# Patient Record
Sex: FEMALE | Race: WHITE | NOT HISPANIC OR LATINO | Employment: UNEMPLOYED | URBAN - METROPOLITAN AREA
[De-identification: names, ages, dates, MRNs, and addresses within clinical notes are randomized per-mention and may not be internally consistent; named-entity substitution may affect disease eponyms.]

---

## 2017-06-20 ASSESSMENT — MIFFLIN-ST. JEOR: SCORE: 1449.1

## 2017-06-22 ENCOUNTER — ANESTHESIA - HEALTHEAST (OUTPATIENT)
Dept: SURGERY | Facility: CLINIC | Age: 52
End: 2017-06-22

## 2017-06-23 ENCOUNTER — SURGERY - HEALTHEAST (OUTPATIENT)
Dept: SURGERY | Facility: CLINIC | Age: 52
End: 2017-06-23

## 2017-07-28 ENCOUNTER — ANESTHESIA - HEALTHEAST (OUTPATIENT)
Dept: SURGERY | Facility: CLINIC | Age: 52
End: 2017-07-28

## 2017-07-28 ENCOUNTER — SURGERY - HEALTHEAST (OUTPATIENT)
Dept: SURGERY | Facility: CLINIC | Age: 52
End: 2017-07-28

## 2018-01-10 ENCOUNTER — AMBULATORY - HEALTHEAST (OUTPATIENT)
Dept: PALLIATIVE MEDICINE | Facility: OTHER | Age: 53
End: 2018-01-10

## 2018-01-10 DIAGNOSIS — M25.572 LEFT ANKLE PAIN: ICD-10-CM

## 2018-01-24 ASSESSMENT — MIFFLIN-ST. JEOR: SCORE: 1539.82

## 2018-01-26 ENCOUNTER — ANESTHESIA - HEALTHEAST (OUTPATIENT)
Dept: SURGERY | Facility: CLINIC | Age: 53
End: 2018-01-26

## 2018-01-26 ENCOUNTER — SURGERY - HEALTHEAST (OUTPATIENT)
Dept: SURGERY | Facility: CLINIC | Age: 53
End: 2018-01-26

## 2018-01-26 ASSESSMENT — MIFFLIN-ST. JEOR
SCORE: 1588.01
SCORE: 1577.47

## 2018-03-22 ENCOUNTER — HOSPITAL ENCOUNTER (OUTPATIENT)
Dept: PALLIATIVE MEDICINE | Facility: OTHER | Age: 53
Discharge: HOME | End: 2018-03-22
Attending: ORTHOPAEDIC SURGERY

## 2018-03-22 ENCOUNTER — RECORDS - HEALTHEAST (OUTPATIENT)
Dept: ADMINISTRATIVE | Facility: OTHER | Age: 53
End: 2018-03-22

## 2018-03-22 DIAGNOSIS — G89.29 CHRONIC PAIN OF LEFT ANKLE: ICD-10-CM

## 2018-03-22 DIAGNOSIS — M25.572 CHRONIC PAIN OF LEFT ANKLE: ICD-10-CM

## 2018-03-22 DIAGNOSIS — G89.4 CHRONIC PAIN SYNDROME: ICD-10-CM

## 2018-03-22 ASSESSMENT — MIFFLIN-ST. JEOR: SCORE: 1588.01

## 2018-03-27 ENCOUNTER — RECORDS - HEALTHEAST (OUTPATIENT)
Dept: ADMINISTRATIVE | Facility: OTHER | Age: 53
End: 2018-03-27

## 2018-03-27 ENCOUNTER — RECORDS - HEALTHEAST (OUTPATIENT)
Dept: BONE DENSITY | Facility: CLINIC | Age: 53
End: 2018-03-27

## 2018-03-27 DIAGNOSIS — M25.572 PAIN IN LEFT ANKLE AND JOINTS OF LEFT FOOT: ICD-10-CM

## 2018-03-27 DIAGNOSIS — G89.29 OTHER CHRONIC PAIN: ICD-10-CM

## 2018-03-27 DIAGNOSIS — G89.4 CHRONIC PAIN SYNDROME: ICD-10-CM

## 2018-04-13 ENCOUNTER — HOSPITAL ENCOUNTER (OUTPATIENT)
Dept: PALLIATIVE MEDICINE | Facility: OTHER | Age: 53
Discharge: HOME | End: 2018-04-13
Attending: NURSE PRACTITIONER

## 2018-04-13 DIAGNOSIS — M25.572 CHRONIC PAIN OF LEFT ANKLE: ICD-10-CM

## 2018-04-13 DIAGNOSIS — G89.4 CHRONIC PAIN SYNDROME: ICD-10-CM

## 2018-04-13 DIAGNOSIS — S93.422S TEAR OF DELTOID LIGAMENT OF ANKLE, LEFT, SEQUELA: ICD-10-CM

## 2018-04-13 DIAGNOSIS — G89.29 CHRONIC PAIN OF LEFT ANKLE: ICD-10-CM

## 2018-04-13 ASSESSMENT — MIFFLIN-ST. JEOR: SCORE: 1588.01

## 2018-04-20 ENCOUNTER — RECORDS - HEALTHEAST (OUTPATIENT)
Dept: ADMINISTRATIVE | Facility: OTHER | Age: 53
End: 2018-04-20

## 2018-04-23 ENCOUNTER — HOSPITAL ENCOUNTER (OUTPATIENT)
Dept: PALLIATIVE MEDICINE | Facility: OTHER | Age: 53
Discharge: HOME | End: 2018-04-23
Attending: NURSE PRACTITIONER

## 2018-04-23 DIAGNOSIS — G89.29 CHRONIC PAIN OF LEFT ANKLE: ICD-10-CM

## 2018-04-23 DIAGNOSIS — G89.4 CHRONIC PAIN SYNDROME: ICD-10-CM

## 2018-04-23 DIAGNOSIS — S93.422S TEAR OF DELTOID LIGAMENT OF ANKLE, LEFT, SEQUELA: ICD-10-CM

## 2018-04-23 DIAGNOSIS — M25.572 CHRONIC PAIN OF LEFT ANKLE: ICD-10-CM

## 2018-04-23 ASSESSMENT — MIFFLIN-ST. JEOR: SCORE: 1588.01

## 2018-05-01 ENCOUNTER — HOSPITAL ENCOUNTER (OUTPATIENT)
Dept: PALLIATIVE MEDICINE | Facility: OTHER | Age: 53
Discharge: HOME | End: 2018-05-01
Attending: COUNSELOR

## 2018-05-01 DIAGNOSIS — F33.2 SEVERE EPISODE OF RECURRENT MAJOR DEPRESSIVE DISORDER, WITHOUT PSYCHOTIC FEATURES (H): ICD-10-CM

## 2018-05-01 DIAGNOSIS — F11.10 OPIATE ABUSE, EPISODIC (H): ICD-10-CM

## 2018-05-01 DIAGNOSIS — F41.0 PANIC ATTACKS: ICD-10-CM

## 2018-05-01 DIAGNOSIS — F41.1 GENERALIZED ANXIETY DISORDER: ICD-10-CM

## 2018-05-01 DIAGNOSIS — F12.10 CANNABIS ABUSE: ICD-10-CM

## 2018-05-01 DIAGNOSIS — G89.4 CHRONIC PAIN SYNDROME: ICD-10-CM

## 2018-05-01 DIAGNOSIS — F10.10 ALCOHOL ABUSE: ICD-10-CM

## 2018-05-21 ENCOUNTER — COMMUNICATION - HEALTHEAST (OUTPATIENT)
Dept: PALLIATIVE MEDICINE | Facility: OTHER | Age: 53
End: 2018-05-21

## 2018-05-21 ENCOUNTER — HOSPITAL ENCOUNTER (OUTPATIENT)
Dept: PALLIATIVE MEDICINE | Facility: OTHER | Age: 53
Discharge: HOME | End: 2018-05-21
Attending: NURSE PRACTITIONER

## 2018-05-21 DIAGNOSIS — S93.422S TEAR OF DELTOID LIGAMENT OF ANKLE, LEFT, SEQUELA: ICD-10-CM

## 2018-05-21 DIAGNOSIS — G89.4 CHRONIC PAIN SYNDROME: ICD-10-CM

## 2018-05-21 DIAGNOSIS — G89.29 CHRONIC PAIN OF LEFT ANKLE: ICD-10-CM

## 2018-05-21 DIAGNOSIS — M25.572 CHRONIC PAIN OF LEFT ANKLE: ICD-10-CM

## 2018-05-21 ASSESSMENT — MIFFLIN-ST. JEOR: SCORE: 1588.01

## 2018-05-24 ENCOUNTER — COMMUNICATION - HEALTHEAST (OUTPATIENT)
Dept: PALLIATIVE MEDICINE | Facility: CLINIC | Age: 53
End: 2018-05-24

## 2018-05-24 DIAGNOSIS — G89.4 CHRONIC PAIN SYNDROME: ICD-10-CM

## 2018-05-24 DIAGNOSIS — M25.572 CHRONIC PAIN OF LEFT ANKLE: ICD-10-CM

## 2018-05-24 DIAGNOSIS — G89.29 CHRONIC PAIN OF LEFT ANKLE: ICD-10-CM

## 2018-05-29 ENCOUNTER — COMMUNICATION - HEALTHEAST (OUTPATIENT)
Dept: PALLIATIVE MEDICINE | Facility: CLINIC | Age: 53
End: 2018-05-29

## 2018-05-30 ENCOUNTER — HOSPITAL ENCOUNTER (OUTPATIENT)
Dept: PALLIATIVE MEDICINE | Facility: OTHER | Age: 53
Discharge: HOME | End: 2018-05-30
Attending: NURSE PRACTITIONER

## 2018-05-30 DIAGNOSIS — G89.4 CHRONIC PAIN SYNDROME: ICD-10-CM

## 2018-05-30 DIAGNOSIS — G89.29 CHRONIC PAIN OF LEFT ANKLE: ICD-10-CM

## 2018-05-30 DIAGNOSIS — M25.572 CHRONIC PAIN OF LEFT ANKLE: ICD-10-CM

## 2018-05-30 ASSESSMENT — MIFFLIN-ST. JEOR: SCORE: 1588.01

## 2018-05-31 ENCOUNTER — COMMUNICATION - HEALTHEAST (OUTPATIENT)
Dept: PALLIATIVE MEDICINE | Facility: OTHER | Age: 53
End: 2018-05-31

## 2018-06-06 ENCOUNTER — COMMUNICATION - HEALTHEAST (OUTPATIENT)
Dept: PALLIATIVE MEDICINE | Facility: OTHER | Age: 53
End: 2018-06-06

## 2018-06-08 ENCOUNTER — COMMUNICATION - HEALTHEAST (OUTPATIENT)
Dept: PALLIATIVE MEDICINE | Facility: OTHER | Age: 53
End: 2018-06-08

## 2018-06-08 DIAGNOSIS — G89.4 CHRONIC PAIN SYNDROME: ICD-10-CM

## 2018-06-08 DIAGNOSIS — G89.29 CHRONIC PAIN OF LEFT ANKLE: ICD-10-CM

## 2018-06-08 DIAGNOSIS — M25.572 CHRONIC PAIN OF LEFT ANKLE: ICD-10-CM

## 2018-06-15 ENCOUNTER — COMMUNICATION - HEALTHEAST (OUTPATIENT)
Dept: PALLIATIVE MEDICINE | Facility: OTHER | Age: 53
End: 2018-06-15

## 2018-06-15 DIAGNOSIS — M25.572 CHRONIC PAIN OF LEFT ANKLE: ICD-10-CM

## 2018-06-15 DIAGNOSIS — G89.29 CHRONIC PAIN OF LEFT ANKLE: ICD-10-CM

## 2018-06-15 DIAGNOSIS — G89.4 CHRONIC PAIN SYNDROME: ICD-10-CM

## 2018-06-18 ASSESSMENT — MIFFLIN-ST. JEOR: SCORE: 1567.63

## 2018-06-22 ENCOUNTER — ANESTHESIA - HEALTHEAST (OUTPATIENT)
Dept: SURGERY | Facility: CLINIC | Age: 53
End: 2018-06-22

## 2018-06-22 ENCOUNTER — SURGERY - HEALTHEAST (OUTPATIENT)
Dept: SURGERY | Facility: CLINIC | Age: 53
End: 2018-06-22

## 2018-06-22 ASSESSMENT — MIFFLIN-ST. JEOR: SCORE: 1567.04

## 2018-06-27 ENCOUNTER — HOSPITAL ENCOUNTER (OUTPATIENT)
Dept: PALLIATIVE MEDICINE | Facility: OTHER | Age: 53
Discharge: HOME | End: 2018-06-27
Attending: NURSE PRACTITIONER

## 2018-06-27 DIAGNOSIS — G89.4 CHRONIC PAIN SYNDROME: ICD-10-CM

## 2018-06-27 ASSESSMENT — MIFFLIN-ST. JEOR: SCORE: 1567.04

## 2018-07-09 ENCOUNTER — HOSPITAL ENCOUNTER (OUTPATIENT)
Dept: PALLIATIVE MEDICINE | Facility: OTHER | Age: 53
Discharge: HOME | End: 2018-07-09
Attending: ANESTHESIOLOGY

## 2018-07-09 DIAGNOSIS — G89.4 CHRONIC PAIN SYNDROME: ICD-10-CM

## 2018-07-20 ENCOUNTER — HOSPITAL ENCOUNTER (OUTPATIENT)
Dept: PALLIATIVE MEDICINE | Facility: OTHER | Age: 53
Discharge: HOME | End: 2018-07-20
Attending: NURSE PRACTITIONER

## 2018-07-20 DIAGNOSIS — Z98.890 S/P BONE GRAFT: ICD-10-CM

## 2018-07-20 DIAGNOSIS — G89.18 POSTOPERATIVE PAIN: ICD-10-CM

## 2018-07-20 DIAGNOSIS — Z98.1 STATUS POST ANKLE FUSION: ICD-10-CM

## 2018-07-20 DIAGNOSIS — G89.4 CHRONIC PAIN SYNDROME: ICD-10-CM

## 2018-07-20 DIAGNOSIS — Z98.890 STATUS POST HARDWARE REMOVAL: ICD-10-CM

## 2018-07-20 ASSESSMENT — MIFFLIN-ST. JEOR: SCORE: 1567.04

## 2018-07-24 ENCOUNTER — COMMUNICATION - HEALTHEAST (OUTPATIENT)
Dept: PALLIATIVE MEDICINE | Facility: CLINIC | Age: 53
End: 2018-07-24

## 2018-07-24 DIAGNOSIS — G89.18 POSTOPERATIVE PAIN: ICD-10-CM

## 2018-07-25 ENCOUNTER — HOSPITAL ENCOUNTER (OUTPATIENT)
Dept: PALLIATIVE MEDICINE | Facility: OTHER | Age: 53
Discharge: HOME | End: 2018-07-25
Attending: ANESTHESIOLOGY

## 2018-07-25 ENCOUNTER — COMMUNICATION - HEALTHEAST (OUTPATIENT)
Dept: PALLIATIVE MEDICINE | Facility: OTHER | Age: 53
End: 2018-07-25

## 2018-07-25 DIAGNOSIS — F11.90 CHRONIC, CONTINUOUS USE OF OPIOIDS: ICD-10-CM

## 2018-07-25 ASSESSMENT — MIFFLIN-ST. JEOR: SCORE: 1567.04

## 2018-07-27 ENCOUNTER — COMMUNICATION - HEALTHEAST (OUTPATIENT)
Dept: PALLIATIVE MEDICINE | Facility: OTHER | Age: 53
End: 2018-07-27

## 2018-08-06 ENCOUNTER — COMMUNICATION - HEALTHEAST (OUTPATIENT)
Dept: PALLIATIVE MEDICINE | Facility: OTHER | Age: 53
End: 2018-08-06

## 2018-08-13 ENCOUNTER — COMMUNICATION - HEALTHEAST (OUTPATIENT)
Dept: PALLIATIVE MEDICINE | Facility: OTHER | Age: 53
End: 2018-08-13

## 2018-08-13 DIAGNOSIS — Z98.890 STATUS POST HARDWARE REMOVAL: ICD-10-CM

## 2018-08-13 DIAGNOSIS — G89.18 POSTOPERATIVE PAIN: ICD-10-CM

## 2018-08-13 DIAGNOSIS — Z98.1 STATUS POST ANKLE FUSION: ICD-10-CM

## 2018-08-13 DIAGNOSIS — Z98.890 S/P BONE GRAFT: ICD-10-CM

## 2018-08-17 ENCOUNTER — HOSPITAL ENCOUNTER (OUTPATIENT)
Dept: PALLIATIVE MEDICINE | Facility: OTHER | Age: 53
Discharge: HOME | End: 2018-08-17
Attending: NURSE PRACTITIONER

## 2018-08-17 DIAGNOSIS — Z98.890 STATUS POST HARDWARE REMOVAL: ICD-10-CM

## 2018-08-17 DIAGNOSIS — G89.4 CHRONIC PAIN SYNDROME: ICD-10-CM

## 2018-08-17 DIAGNOSIS — Z98.890 S/P BONE GRAFT: ICD-10-CM

## 2018-08-17 DIAGNOSIS — Z98.1 STATUS POST ANKLE FUSION: ICD-10-CM

## 2018-08-17 DIAGNOSIS — G89.29 CHRONIC PAIN OF LEFT ANKLE: ICD-10-CM

## 2018-08-17 DIAGNOSIS — M25.572 CHRONIC PAIN OF LEFT ANKLE: ICD-10-CM

## 2018-08-17 DIAGNOSIS — G89.18 POSTOPERATIVE PAIN: ICD-10-CM

## 2018-08-17 ASSESSMENT — MIFFLIN-ST. JEOR: SCORE: 1567.04

## 2018-08-22 ENCOUNTER — COMMUNICATION - HEALTHEAST (OUTPATIENT)
Dept: PALLIATIVE MEDICINE | Facility: OTHER | Age: 53
End: 2018-08-22

## 2018-08-22 DIAGNOSIS — F11.90 CHRONIC, CONTINUOUS USE OF OPIOIDS: ICD-10-CM

## 2018-08-24 ENCOUNTER — HOSPITAL ENCOUNTER (OUTPATIENT)
Dept: PALLIATIVE MEDICINE | Facility: OTHER | Age: 53
Discharge: HOME | End: 2018-08-24
Attending: ANESTHESIOLOGY

## 2018-08-24 DIAGNOSIS — G89.4 CHRONIC PAIN SYNDROME: ICD-10-CM

## 2018-09-07 ENCOUNTER — HOSPITAL ENCOUNTER (OUTPATIENT)
Dept: PALLIATIVE MEDICINE | Facility: OTHER | Age: 53
Discharge: HOME | End: 2018-09-07
Attending: ANESTHESIOLOGY

## 2018-09-07 DIAGNOSIS — F41.9 ANXIETY DISORDER: ICD-10-CM

## 2018-09-09 ENCOUNTER — COMMUNICATION - HEALTHEAST (OUTPATIENT)
Dept: PALLIATIVE MEDICINE | Facility: OTHER | Age: 53
End: 2018-09-09

## 2018-09-09 DIAGNOSIS — F11.90 CHRONIC, CONTINUOUS USE OF OPIOIDS: ICD-10-CM

## 2018-09-10 ENCOUNTER — COMMUNICATION - HEALTHEAST (OUTPATIENT)
Dept: PALLIATIVE MEDICINE | Facility: CLINIC | Age: 53
End: 2018-09-10

## 2018-09-10 DIAGNOSIS — F11.90 CHRONIC, CONTINUOUS USE OF OPIOIDS: ICD-10-CM

## 2018-09-14 ENCOUNTER — HOSPITAL ENCOUNTER (OUTPATIENT)
Dept: PALLIATIVE MEDICINE | Facility: OTHER | Age: 53
Discharge: HOME | End: 2018-09-14
Attending: NURSE PRACTITIONER

## 2018-09-14 DIAGNOSIS — G89.29 CHRONIC PAIN OF LEFT ANKLE: ICD-10-CM

## 2018-09-14 DIAGNOSIS — F11.90 CHRONIC, CONTINUOUS USE OF OPIOIDS: ICD-10-CM

## 2018-09-14 DIAGNOSIS — Z98.890 S/P BONE GRAFT: ICD-10-CM

## 2018-09-14 DIAGNOSIS — Z98.890 S/P ANKLE LIGAMENT REPAIR: ICD-10-CM

## 2018-09-14 DIAGNOSIS — Z98.1 STATUS POST ANKLE FUSION: ICD-10-CM

## 2018-09-14 DIAGNOSIS — F32.A DEPRESSION, UNSPECIFIED DEPRESSION TYPE: ICD-10-CM

## 2018-09-14 DIAGNOSIS — Z98.890 STATUS POST HARDWARE REMOVAL: ICD-10-CM

## 2018-09-14 DIAGNOSIS — M25.572 CHRONIC PAIN OF LEFT ANKLE: ICD-10-CM

## 2018-09-14 DIAGNOSIS — G89.18 POSTOPERATIVE PAIN: ICD-10-CM

## 2018-09-14 ASSESSMENT — MIFFLIN-ST. JEOR: SCORE: 1567.04

## 2018-09-19 ENCOUNTER — COMMUNICATION - HEALTHEAST (OUTPATIENT)
Dept: PALLIATIVE MEDICINE | Facility: CLINIC | Age: 53
End: 2018-09-19

## 2018-09-19 DIAGNOSIS — M25.572 CHRONIC PAIN OF LEFT ANKLE: ICD-10-CM

## 2018-09-19 DIAGNOSIS — G89.29 CHRONIC PAIN OF LEFT ANKLE: ICD-10-CM

## 2018-09-19 DIAGNOSIS — G89.4 CHRONIC PAIN SYNDROME: ICD-10-CM

## 2018-09-28 ENCOUNTER — COMMUNICATION - HEALTHEAST (OUTPATIENT)
Dept: PALLIATIVE MEDICINE | Facility: OTHER | Age: 53
End: 2018-09-28

## 2018-10-08 ENCOUNTER — HOSPITAL ENCOUNTER (OUTPATIENT)
Dept: PALLIATIVE MEDICINE | Facility: OTHER | Age: 53
Discharge: HOME | End: 2018-10-08
Attending: NURSE PRACTITIONER

## 2018-10-08 DIAGNOSIS — Z98.890 S/P BONE GRAFT: ICD-10-CM

## 2018-10-08 DIAGNOSIS — Z98.1 STATUS POST ANKLE FUSION: ICD-10-CM

## 2018-10-08 DIAGNOSIS — F11.90 CHRONIC, CONTINUOUS USE OF OPIOIDS: ICD-10-CM

## 2018-10-08 DIAGNOSIS — G89.4 CHRONIC PAIN SYNDROME: ICD-10-CM

## 2018-10-08 DIAGNOSIS — G89.18 POSTOPERATIVE PAIN: ICD-10-CM

## 2018-10-08 DIAGNOSIS — G89.29 CHRONIC PAIN OF LEFT ANKLE: ICD-10-CM

## 2018-10-08 DIAGNOSIS — Z98.890 STATUS POST HARDWARE REMOVAL: ICD-10-CM

## 2018-10-08 DIAGNOSIS — M25.572 CHRONIC PAIN OF LEFT ANKLE: ICD-10-CM

## 2018-10-08 ASSESSMENT — MIFFLIN-ST. JEOR: SCORE: 1567.04

## 2018-10-10 ENCOUNTER — COMMUNICATION - HEALTHEAST (OUTPATIENT)
Dept: PALLIATIVE MEDICINE | Facility: OTHER | Age: 53
End: 2018-10-10

## 2018-10-10 DIAGNOSIS — G89.29 CHRONIC PAIN: ICD-10-CM

## 2018-10-11 ENCOUNTER — COMMUNICATION - HEALTHEAST (OUTPATIENT)
Dept: PALLIATIVE MEDICINE | Facility: OTHER | Age: 53
End: 2018-10-11

## 2018-10-15 ENCOUNTER — COMMUNICATION - HEALTHEAST (OUTPATIENT)
Dept: PALLIATIVE MEDICINE | Facility: OTHER | Age: 53
End: 2018-10-15

## 2018-10-15 DIAGNOSIS — F11.90 CHRONIC, CONTINUOUS USE OF OPIOIDS: ICD-10-CM

## 2018-10-15 DIAGNOSIS — Z98.890 S/P BONE GRAFT: ICD-10-CM

## 2018-10-15 DIAGNOSIS — Z98.1 STATUS POST ANKLE FUSION: ICD-10-CM

## 2018-10-15 DIAGNOSIS — F32.A DEPRESSION, UNSPECIFIED DEPRESSION TYPE: ICD-10-CM

## 2018-10-15 DIAGNOSIS — G89.29 CHRONIC PAIN OF LEFT ANKLE: ICD-10-CM

## 2018-10-15 DIAGNOSIS — M25.572 CHRONIC PAIN OF LEFT ANKLE: ICD-10-CM

## 2018-10-15 DIAGNOSIS — Z98.890 S/P ANKLE LIGAMENT REPAIR: ICD-10-CM

## 2018-10-15 DIAGNOSIS — Z98.890 STATUS POST HARDWARE REMOVAL: ICD-10-CM

## 2018-10-15 DIAGNOSIS — G89.18 POSTOPERATIVE PAIN: ICD-10-CM

## 2018-10-16 ENCOUNTER — HOSPITAL ENCOUNTER (OUTPATIENT)
Dept: PALLIATIVE MEDICINE | Facility: OTHER | Age: 53
Discharge: HOME | End: 2018-10-16
Attending: ANESTHESIOLOGY

## 2018-10-16 DIAGNOSIS — Z98.1 STATUS POST ANKLE FUSION: ICD-10-CM

## 2018-10-16 DIAGNOSIS — M25.572 CHRONIC PAIN OF LEFT ANKLE: ICD-10-CM

## 2018-10-16 DIAGNOSIS — Z98.890 STATUS POST HARDWARE REMOVAL: ICD-10-CM

## 2018-10-16 DIAGNOSIS — G89.18 POSTOPERATIVE PAIN: ICD-10-CM

## 2018-10-16 DIAGNOSIS — G89.4 CHRONIC PAIN SYNDROME: ICD-10-CM

## 2018-10-16 DIAGNOSIS — G89.29 CHRONIC PAIN OF LEFT ANKLE: ICD-10-CM

## 2018-10-16 DIAGNOSIS — Z98.890 S/P BONE GRAFT: ICD-10-CM

## 2018-10-16 ASSESSMENT — MIFFLIN-ST. JEOR: SCORE: 1567.04

## 2018-10-17 ENCOUNTER — COMMUNICATION - HEALTHEAST (OUTPATIENT)
Dept: PALLIATIVE MEDICINE | Facility: OTHER | Age: 53
End: 2018-10-17

## 2018-10-17 DIAGNOSIS — F43.23 ADJUSTMENT DISORDER WITH MIXED ANXIETY AND DEPRESSED MOOD: ICD-10-CM

## 2018-10-18 ENCOUNTER — COMMUNICATION - HEALTHEAST (OUTPATIENT)
Dept: PALLIATIVE MEDICINE | Facility: CLINIC | Age: 53
End: 2018-10-18

## 2018-10-18 DIAGNOSIS — M25.572 CHRONIC PAIN OF LEFT ANKLE: ICD-10-CM

## 2018-10-18 DIAGNOSIS — G89.4 CHRONIC PAIN SYNDROME: ICD-10-CM

## 2018-10-18 DIAGNOSIS — G89.29 CHRONIC PAIN OF LEFT ANKLE: ICD-10-CM

## 2018-10-22 ENCOUNTER — HOSPITAL ENCOUNTER (OUTPATIENT)
Dept: PALLIATIVE MEDICINE | Facility: OTHER | Age: 53
Discharge: HOME | End: 2018-10-22
Attending: NURSE PRACTITIONER

## 2018-10-22 DIAGNOSIS — G89.18 POSTOPERATIVE PAIN: ICD-10-CM

## 2018-10-22 DIAGNOSIS — G89.4 CHRONIC PAIN SYNDROME: ICD-10-CM

## 2018-10-22 DIAGNOSIS — Z98.1 STATUS POST ANKLE FUSION: ICD-10-CM

## 2018-10-22 DIAGNOSIS — Z98.890 STATUS POST HARDWARE REMOVAL: ICD-10-CM

## 2018-10-22 DIAGNOSIS — Z98.890 S/P BONE GRAFT: ICD-10-CM

## 2018-10-22 ASSESSMENT — MIFFLIN-ST. JEOR: SCORE: 1476.32

## 2018-10-23 ENCOUNTER — COMMUNICATION - HEALTHEAST (OUTPATIENT)
Dept: PALLIATIVE MEDICINE | Facility: OTHER | Age: 53
End: 2018-10-23

## 2018-11-01 ENCOUNTER — COMMUNICATION - HEALTHEAST (OUTPATIENT)
Dept: PALLIATIVE MEDICINE | Facility: CLINIC | Age: 53
End: 2018-11-01

## 2018-11-01 DIAGNOSIS — F11.90 CHRONIC, CONTINUOUS USE OF OPIOIDS: ICD-10-CM

## 2018-11-05 ENCOUNTER — COMMUNICATION - HEALTHEAST (OUTPATIENT)
Dept: PALLIATIVE MEDICINE | Facility: OTHER | Age: 53
End: 2018-11-05

## 2018-11-05 ENCOUNTER — HOSPITAL ENCOUNTER (OUTPATIENT)
Dept: PALLIATIVE MEDICINE | Facility: OTHER | Age: 53
Discharge: HOME | End: 2018-11-05
Attending: NURSE PRACTITIONER

## 2018-11-05 DIAGNOSIS — F11.90 CHRONIC, CONTINUOUS USE OF OPIOIDS: ICD-10-CM

## 2018-11-05 DIAGNOSIS — G89.4 CHRONIC PAIN SYNDROME: ICD-10-CM

## 2018-11-05 DIAGNOSIS — F43.23 ADJUSTMENT DISORDER WITH MIXED ANXIETY AND DEPRESSED MOOD: ICD-10-CM

## 2018-11-05 DIAGNOSIS — G89.18 POSTOPERATIVE PAIN: ICD-10-CM

## 2018-11-05 ASSESSMENT — MIFFLIN-ST. JEOR: SCORE: 1476.32

## 2018-11-16 ENCOUNTER — COMMUNICATION - HEALTHEAST (OUTPATIENT)
Dept: PALLIATIVE MEDICINE | Facility: OTHER | Age: 53
End: 2018-11-16

## 2018-11-16 DIAGNOSIS — Z98.890 S/P ANKLE LIGAMENT REPAIR: ICD-10-CM

## 2018-11-16 DIAGNOSIS — M25.572 CHRONIC PAIN OF LEFT ANKLE: ICD-10-CM

## 2018-11-16 DIAGNOSIS — F32.A DEPRESSION, UNSPECIFIED DEPRESSION TYPE: ICD-10-CM

## 2018-11-16 DIAGNOSIS — F11.90 CHRONIC, CONTINUOUS USE OF OPIOIDS: ICD-10-CM

## 2018-11-16 DIAGNOSIS — Z98.890 S/P BONE GRAFT: ICD-10-CM

## 2018-11-16 DIAGNOSIS — G89.29 CHRONIC PAIN OF LEFT ANKLE: ICD-10-CM

## 2018-11-16 DIAGNOSIS — G89.18 POSTOPERATIVE PAIN: ICD-10-CM

## 2018-11-16 DIAGNOSIS — Z98.890 STATUS POST HARDWARE REMOVAL: ICD-10-CM

## 2018-11-16 DIAGNOSIS — Z98.1 STATUS POST ANKLE FUSION: ICD-10-CM

## 2018-11-20 ENCOUNTER — HOSPITAL ENCOUNTER (OUTPATIENT)
Dept: PALLIATIVE MEDICINE | Facility: OTHER | Age: 53
Discharge: HOME | End: 2018-11-20
Attending: NURSE PRACTITIONER

## 2018-11-20 DIAGNOSIS — G89.18 POSTOPERATIVE PAIN: ICD-10-CM

## 2018-11-20 DIAGNOSIS — Z98.890 S/P BONE GRAFT: ICD-10-CM

## 2018-11-20 DIAGNOSIS — Z98.890 STATUS POST HARDWARE REMOVAL: ICD-10-CM

## 2018-11-20 DIAGNOSIS — Z98.1 STATUS POST ANKLE FUSION: ICD-10-CM

## 2018-11-20 DIAGNOSIS — G89.4 CHRONIC PAIN SYNDROME: ICD-10-CM

## 2018-11-20 ASSESSMENT — MIFFLIN-ST. JEOR: SCORE: 1476.32

## 2018-11-27 ENCOUNTER — COMMUNICATION - HEALTHEAST (OUTPATIENT)
Dept: PALLIATIVE MEDICINE | Facility: OTHER | Age: 53
End: 2018-11-27

## 2018-11-27 ENCOUNTER — HOSPITAL ENCOUNTER (OUTPATIENT)
Dept: PALLIATIVE MEDICINE | Facility: OTHER | Age: 53
Discharge: HOME | End: 2018-11-27
Attending: ANESTHESIOLOGY

## 2018-11-27 DIAGNOSIS — Z98.1 STATUS POST ANKLE FUSION: ICD-10-CM

## 2018-11-27 DIAGNOSIS — M25.572 CHRONIC PAIN OF LEFT ANKLE: ICD-10-CM

## 2018-11-27 DIAGNOSIS — Z98.890 S/P BONE GRAFT: ICD-10-CM

## 2018-11-27 DIAGNOSIS — Z98.890 S/P ANKLE LIGAMENT REPAIR: ICD-10-CM

## 2018-11-27 DIAGNOSIS — F11.90 CHRONIC, CONTINUOUS USE OF OPIOIDS: ICD-10-CM

## 2018-11-27 DIAGNOSIS — G89.18 POSTOPERATIVE PAIN: ICD-10-CM

## 2018-11-27 DIAGNOSIS — G89.29 CHRONIC PAIN OF LEFT ANKLE: ICD-10-CM

## 2018-11-27 DIAGNOSIS — Z98.890 STATUS POST HARDWARE REMOVAL: ICD-10-CM

## 2018-11-27 DIAGNOSIS — F32.A DEPRESSION, UNSPECIFIED DEPRESSION TYPE: ICD-10-CM

## 2018-11-27 DIAGNOSIS — F43.23 ADJUSTMENT DISORDER WITH MIXED ANXIETY AND DEPRESSED MOOD: ICD-10-CM

## 2018-11-27 ASSESSMENT — MIFFLIN-ST. JEOR: SCORE: 1476.32

## 2018-11-28 ENCOUNTER — COMMUNICATION - HEALTHEAST (OUTPATIENT)
Dept: PALLIATIVE MEDICINE | Facility: OTHER | Age: 53
End: 2018-11-28

## 2018-11-29 ENCOUNTER — COMMUNICATION - HEALTHEAST (OUTPATIENT)
Dept: PALLIATIVE MEDICINE | Facility: OTHER | Age: 53
End: 2018-11-29

## 2018-11-29 DIAGNOSIS — G89.4 CHRONIC PAIN SYNDROME: ICD-10-CM

## 2018-11-29 DIAGNOSIS — M25.572 CHRONIC PAIN OF LEFT ANKLE: ICD-10-CM

## 2018-11-29 DIAGNOSIS — G89.29 CHRONIC PAIN OF LEFT ANKLE: ICD-10-CM

## 2018-12-18 ENCOUNTER — HOSPITAL ENCOUNTER (OUTPATIENT)
Dept: PALLIATIVE MEDICINE | Facility: OTHER | Age: 53
Discharge: HOME | End: 2018-12-18
Attending: NURSE PRACTITIONER

## 2018-12-18 DIAGNOSIS — G89.29 CHRONIC PAIN OF LEFT ANKLE: ICD-10-CM

## 2018-12-18 DIAGNOSIS — G89.4 CHRONIC PAIN SYNDROME: ICD-10-CM

## 2018-12-18 DIAGNOSIS — M25.572 CHRONIC PAIN OF LEFT ANKLE: ICD-10-CM

## 2018-12-18 DIAGNOSIS — F43.23 ADJUSTMENT DISORDER WITH MIXED ANXIETY AND DEPRESSED MOOD: ICD-10-CM

## 2018-12-18 ASSESSMENT — MIFFLIN-ST. JEOR: SCORE: 1476.32

## 2018-12-22 LAB — ARUP MISCELLANEOUS TEST: NORMAL

## 2018-12-23 LAB
MISCELLANEOUS TEST DEPT. - HE HISTORICAL: NORMAL
PERFORMING LAB: NORMAL
SPECIMEN STATUS: NORMAL
TEST NAME: NORMAL

## 2018-12-27 ENCOUNTER — COMMUNICATION - HEALTHEAST (OUTPATIENT)
Dept: PALLIATIVE MEDICINE | Facility: OTHER | Age: 53
End: 2018-12-27

## 2018-12-27 DIAGNOSIS — G89.4 CHRONIC PAIN SYNDROME: ICD-10-CM

## 2018-12-27 DIAGNOSIS — M25.572 CHRONIC PAIN OF LEFT ANKLE: ICD-10-CM

## 2018-12-27 DIAGNOSIS — G89.29 CHRONIC PAIN OF LEFT ANKLE: ICD-10-CM

## 2018-12-27 DIAGNOSIS — F11.90 CHRONIC, CONTINUOUS USE OF OPIOIDS: ICD-10-CM

## 2018-12-30 ENCOUNTER — COMMUNICATION - HEALTHEAST (OUTPATIENT)
Dept: PALLIATIVE MEDICINE | Facility: CLINIC | Age: 53
End: 2018-12-30

## 2018-12-30 DIAGNOSIS — F11.90 CHRONIC, CONTINUOUS USE OF OPIOIDS: ICD-10-CM

## 2018-12-31 ENCOUNTER — COMMUNICATION - HEALTHEAST (OUTPATIENT)
Dept: PALLIATIVE MEDICINE | Facility: OTHER | Age: 53
End: 2018-12-31

## 2019-01-03 ENCOUNTER — AMBULATORY - HEALTHEAST (OUTPATIENT)
Dept: PALLIATIVE MEDICINE | Facility: OTHER | Age: 54
End: 2019-01-03

## 2019-01-03 ENCOUNTER — COMMUNICATION - HEALTHEAST (OUTPATIENT)
Dept: PALLIATIVE MEDICINE | Facility: OTHER | Age: 54
End: 2019-01-03

## 2019-01-03 DIAGNOSIS — F11.20 CONTINUOUS OPIOID DEPENDENCE (H): ICD-10-CM

## 2019-01-03 DIAGNOSIS — G89.29 CHRONIC PAIN OF LEFT ANKLE: ICD-10-CM

## 2019-01-03 DIAGNOSIS — G89.4 CHRONIC PAIN SYNDROME: ICD-10-CM

## 2019-01-03 DIAGNOSIS — M25.572 CHRONIC PAIN OF LEFT ANKLE: ICD-10-CM

## 2019-01-04 ENCOUNTER — COMMUNICATION - HEALTHEAST (OUTPATIENT)
Dept: PALLIATIVE MEDICINE | Facility: OTHER | Age: 54
End: 2019-01-04

## 2019-01-04 DIAGNOSIS — G89.29 CHRONIC PAIN OF LEFT ANKLE: ICD-10-CM

## 2019-01-04 DIAGNOSIS — M25.572 CHRONIC PAIN OF LEFT ANKLE: ICD-10-CM

## 2019-01-04 DIAGNOSIS — G89.4 CHRONIC PAIN SYNDROME: ICD-10-CM

## 2019-01-14 ENCOUNTER — COMMUNICATION - HEALTHEAST (OUTPATIENT)
Dept: PALLIATIVE MEDICINE | Facility: OTHER | Age: 54
End: 2019-01-14

## 2019-01-14 DIAGNOSIS — F32.A DEPRESSION, UNSPECIFIED DEPRESSION TYPE: ICD-10-CM

## 2019-01-14 DIAGNOSIS — F11.90 CHRONIC, CONTINUOUS USE OF OPIOIDS: ICD-10-CM

## 2019-01-14 DIAGNOSIS — Z98.890 STATUS POST HARDWARE REMOVAL: ICD-10-CM

## 2019-01-14 DIAGNOSIS — Z98.1 STATUS POST ANKLE FUSION: ICD-10-CM

## 2019-01-14 DIAGNOSIS — G89.18 POSTOPERATIVE PAIN: ICD-10-CM

## 2019-01-14 DIAGNOSIS — G89.29 CHRONIC PAIN OF LEFT ANKLE: ICD-10-CM

## 2019-01-14 DIAGNOSIS — Z98.890 S/P BONE GRAFT: ICD-10-CM

## 2019-01-14 DIAGNOSIS — M25.572 CHRONIC PAIN OF LEFT ANKLE: ICD-10-CM

## 2019-01-14 DIAGNOSIS — Z98.890 S/P ANKLE LIGAMENT REPAIR: ICD-10-CM

## 2019-01-17 ENCOUNTER — HOSPITAL ENCOUNTER (OUTPATIENT)
Dept: PALLIATIVE MEDICINE | Facility: OTHER | Age: 54
Discharge: HOME | End: 2019-01-17
Attending: ANESTHESIOLOGY

## 2019-01-17 DIAGNOSIS — G89.4 CHRONIC PAIN SYNDROME: ICD-10-CM

## 2019-01-17 ASSESSMENT — MIFFLIN-ST. JEOR: SCORE: 1476.32

## 2019-01-21 ENCOUNTER — COMMUNICATION - HEALTHEAST (OUTPATIENT)
Dept: PALLIATIVE MEDICINE | Facility: OTHER | Age: 54
End: 2019-01-21

## 2019-01-21 DIAGNOSIS — G89.4 CHRONIC PAIN SYNDROME: ICD-10-CM

## 2019-01-25 ENCOUNTER — COMMUNICATION - HEALTHEAST (OUTPATIENT)
Dept: PALLIATIVE MEDICINE | Facility: OTHER | Age: 54
End: 2019-01-25

## 2019-01-25 DIAGNOSIS — M25.572 CHRONIC PAIN OF LEFT ANKLE: ICD-10-CM

## 2019-01-25 DIAGNOSIS — G89.29 CHRONIC PAIN OF LEFT ANKLE: ICD-10-CM

## 2019-01-25 DIAGNOSIS — G89.18 POSTOPERATIVE PAIN: ICD-10-CM

## 2019-01-25 DIAGNOSIS — G89.4 CHRONIC PAIN SYNDROME: ICD-10-CM

## 2019-01-27 ENCOUNTER — COMMUNICATION - HEALTHEAST (OUTPATIENT)
Dept: PALLIATIVE MEDICINE | Facility: OTHER | Age: 54
End: 2019-01-27

## 2019-01-27 DIAGNOSIS — G89.4 CHRONIC PAIN SYNDROME: ICD-10-CM

## 2019-01-27 DIAGNOSIS — Z98.1 STATUS POST ANKLE FUSION: ICD-10-CM

## 2019-01-27 DIAGNOSIS — G89.18 POSTOPERATIVE PAIN: ICD-10-CM

## 2019-01-28 ENCOUNTER — COMMUNICATION - HEALTHEAST (OUTPATIENT)
Dept: PALLIATIVE MEDICINE | Facility: OTHER | Age: 54
End: 2019-01-28

## 2019-02-01 ENCOUNTER — COMMUNICATION - HEALTHEAST (OUTPATIENT)
Dept: PALLIATIVE MEDICINE | Facility: OTHER | Age: 54
End: 2019-02-01

## 2019-02-01 DIAGNOSIS — M25.572 CHRONIC PAIN OF LEFT ANKLE: ICD-10-CM

## 2019-02-01 DIAGNOSIS — G89.29 CHRONIC PAIN OF LEFT ANKLE: ICD-10-CM

## 2019-02-01 DIAGNOSIS — G89.4 CHRONIC PAIN SYNDROME: ICD-10-CM

## 2019-02-02 ENCOUNTER — COMMUNICATION - HEALTHEAST (OUTPATIENT)
Dept: PALLIATIVE MEDICINE | Facility: CLINIC | Age: 54
End: 2019-02-02

## 2019-02-02 DIAGNOSIS — F11.90 CHRONIC, CONTINUOUS USE OF OPIOIDS: ICD-10-CM

## 2019-02-11 ENCOUNTER — COMMUNICATION - HEALTHEAST (OUTPATIENT)
Dept: PALLIATIVE MEDICINE | Facility: OTHER | Age: 54
End: 2019-02-11

## 2019-02-11 DIAGNOSIS — F11.90 CHRONIC, CONTINUOUS USE OF OPIOIDS: ICD-10-CM

## 2019-02-11 DIAGNOSIS — G89.18 POSTOPERATIVE PAIN: ICD-10-CM

## 2019-02-11 DIAGNOSIS — Z98.890 S/P BONE GRAFT: ICD-10-CM

## 2019-02-11 DIAGNOSIS — Z98.1 STATUS POST ANKLE FUSION: ICD-10-CM

## 2019-02-11 DIAGNOSIS — G89.29 CHRONIC PAIN OF LEFT ANKLE: ICD-10-CM

## 2019-02-11 DIAGNOSIS — Z98.890 STATUS POST HARDWARE REMOVAL: ICD-10-CM

## 2019-02-11 DIAGNOSIS — M25.572 CHRONIC PAIN OF LEFT ANKLE: ICD-10-CM

## 2019-02-11 DIAGNOSIS — F32.A DEPRESSION, UNSPECIFIED DEPRESSION TYPE: ICD-10-CM

## 2019-02-11 DIAGNOSIS — Z98.890 S/P ANKLE LIGAMENT REPAIR: ICD-10-CM

## 2019-02-26 ENCOUNTER — COMMUNICATION - HEALTHEAST (OUTPATIENT)
Dept: PALLIATIVE MEDICINE | Facility: OTHER | Age: 54
End: 2019-02-26

## 2019-02-26 DIAGNOSIS — G89.29 CHRONIC PAIN OF LEFT ANKLE: ICD-10-CM

## 2019-02-26 DIAGNOSIS — M25.572 CHRONIC PAIN OF LEFT ANKLE: ICD-10-CM

## 2019-02-26 DIAGNOSIS — G89.4 CHRONIC PAIN SYNDROME: ICD-10-CM

## 2019-02-26 DIAGNOSIS — G89.18 POSTOPERATIVE PAIN: ICD-10-CM

## 2019-03-11 ENCOUNTER — COMMUNICATION - HEALTHEAST (OUTPATIENT)
Dept: PALLIATIVE MEDICINE | Facility: OTHER | Age: 54
End: 2019-03-11

## 2019-03-11 DIAGNOSIS — F43.23 ADJUSTMENT DISORDER WITH MIXED ANXIETY AND DEPRESSED MOOD: ICD-10-CM

## 2019-03-11 DIAGNOSIS — Z98.890 S/P ANKLE LIGAMENT REPAIR: ICD-10-CM

## 2019-03-11 DIAGNOSIS — Z98.890 STATUS POST HARDWARE REMOVAL: ICD-10-CM

## 2019-03-11 DIAGNOSIS — G89.4 CHRONIC PAIN SYNDROME: ICD-10-CM

## 2019-03-11 DIAGNOSIS — F32.A DEPRESSION, UNSPECIFIED DEPRESSION TYPE: ICD-10-CM

## 2019-03-11 DIAGNOSIS — M25.572 CHRONIC PAIN OF LEFT ANKLE: ICD-10-CM

## 2019-03-11 DIAGNOSIS — F11.90 CHRONIC, CONTINUOUS USE OF OPIOIDS: ICD-10-CM

## 2019-03-11 DIAGNOSIS — G89.18 POSTOPERATIVE PAIN: ICD-10-CM

## 2019-03-11 DIAGNOSIS — Z98.890 S/P BONE GRAFT: ICD-10-CM

## 2019-03-11 DIAGNOSIS — G89.29 CHRONIC PAIN OF LEFT ANKLE: ICD-10-CM

## 2019-03-11 DIAGNOSIS — Z98.1 STATUS POST ANKLE FUSION: ICD-10-CM

## 2019-03-25 ENCOUNTER — COMMUNICATION - HEALTHEAST (OUTPATIENT)
Dept: PALLIATIVE MEDICINE | Facility: OTHER | Age: 54
End: 2019-03-25

## 2019-04-23 ENCOUNTER — COMMUNICATION - HEALTHEAST (OUTPATIENT)
Dept: PALLIATIVE MEDICINE | Facility: OTHER | Age: 54
End: 2019-04-23

## 2019-04-23 DIAGNOSIS — F32.A DEPRESSION, UNSPECIFIED DEPRESSION TYPE: ICD-10-CM

## 2019-04-23 DIAGNOSIS — F11.90 CHRONIC, CONTINUOUS USE OF OPIOIDS: ICD-10-CM

## 2019-04-23 DIAGNOSIS — Z98.890 S/P BONE GRAFT: ICD-10-CM

## 2019-04-23 DIAGNOSIS — Z98.1 STATUS POST ANKLE FUSION: ICD-10-CM

## 2019-04-23 DIAGNOSIS — Z98.890 STATUS POST HARDWARE REMOVAL: ICD-10-CM

## 2019-04-23 DIAGNOSIS — Z98.890 S/P ANKLE LIGAMENT REPAIR: ICD-10-CM

## 2019-04-23 DIAGNOSIS — G89.29 CHRONIC PAIN OF LEFT ANKLE: ICD-10-CM

## 2019-04-23 DIAGNOSIS — M25.572 CHRONIC PAIN OF LEFT ANKLE: ICD-10-CM

## 2019-04-23 DIAGNOSIS — G89.18 POSTOPERATIVE PAIN: ICD-10-CM

## 2019-05-06 ENCOUNTER — COMMUNICATION - HEALTHEAST (OUTPATIENT)
Dept: PALLIATIVE MEDICINE | Facility: OTHER | Age: 54
End: 2019-05-06

## 2019-05-06 ENCOUNTER — SURGERY - HEALTHEAST (OUTPATIENT)
Dept: FAMILY MEDICINE | Facility: CLINIC | Age: 54
End: 2019-05-06

## 2019-05-06 DIAGNOSIS — G89.29 CHRONIC PAIN OF LEFT ANKLE: ICD-10-CM

## 2019-05-06 DIAGNOSIS — G89.18 POSTOPERATIVE PAIN: ICD-10-CM

## 2019-05-06 DIAGNOSIS — M25.572 CHRONIC PAIN OF LEFT ANKLE: ICD-10-CM

## 2019-05-06 DIAGNOSIS — Z98.1 STATUS POST ANKLE FUSION: ICD-10-CM

## 2019-05-06 DIAGNOSIS — G89.4 CHRONIC PAIN SYNDROME: ICD-10-CM

## 2019-05-06 ASSESSMENT — MIFFLIN-ST. JEOR: SCORE: 1426.42

## 2019-05-08 ENCOUNTER — COMMUNICATION - HEALTHEAST (OUTPATIENT)
Dept: PALLIATIVE MEDICINE | Facility: OTHER | Age: 54
End: 2019-05-08

## 2019-05-14 ENCOUNTER — ANESTHESIA - HEALTHEAST (OUTPATIENT)
Dept: SURGERY | Facility: CLINIC | Age: 54
End: 2019-05-14

## 2019-05-15 ENCOUNTER — SURGERY - HEALTHEAST (OUTPATIENT)
Dept: SURGERY | Facility: CLINIC | Age: 54
End: 2019-05-15

## 2019-05-15 ASSESSMENT — MIFFLIN-ST. JEOR: SCORE: 1480.86

## 2019-05-26 ENCOUNTER — COMMUNICATION - HEALTHEAST (OUTPATIENT)
Dept: PALLIATIVE MEDICINE | Facility: OTHER | Age: 54
End: 2019-05-26

## 2019-05-26 DIAGNOSIS — F11.90 CHRONIC, CONTINUOUS USE OF OPIOIDS: ICD-10-CM

## 2019-06-03 ENCOUNTER — COMMUNICATION - HEALTHEAST (OUTPATIENT)
Dept: PALLIATIVE MEDICINE | Facility: OTHER | Age: 54
End: 2019-06-03

## 2019-06-03 DIAGNOSIS — G89.29 CHRONIC PAIN: ICD-10-CM

## 2019-06-04 ENCOUNTER — COMMUNICATION - HEALTHEAST (OUTPATIENT)
Dept: PALLIATIVE MEDICINE | Facility: OTHER | Age: 54
End: 2019-06-04

## 2019-06-04 DIAGNOSIS — F43.23 ADJUSTMENT DISORDER WITH MIXED ANXIETY AND DEPRESSED MOOD: ICD-10-CM

## 2019-06-04 DIAGNOSIS — G89.29 CHRONIC PAIN OF LEFT ANKLE: ICD-10-CM

## 2019-06-04 DIAGNOSIS — Z98.890 S/P BONE GRAFT: ICD-10-CM

## 2019-06-04 DIAGNOSIS — Z98.1 STATUS POST ANKLE FUSION: ICD-10-CM

## 2019-06-04 DIAGNOSIS — Z98.890 S/P ANKLE LIGAMENT REPAIR: ICD-10-CM

## 2019-06-04 DIAGNOSIS — F11.90 CHRONIC, CONTINUOUS USE OF OPIOIDS: ICD-10-CM

## 2019-06-04 DIAGNOSIS — G89.18 POSTOPERATIVE PAIN: ICD-10-CM

## 2019-06-04 DIAGNOSIS — Z98.890 STATUS POST HARDWARE REMOVAL: ICD-10-CM

## 2019-06-04 DIAGNOSIS — F32.A DEPRESSION, UNSPECIFIED DEPRESSION TYPE: ICD-10-CM

## 2019-06-04 DIAGNOSIS — M25.572 CHRONIC PAIN OF LEFT ANKLE: ICD-10-CM

## 2019-06-05 ENCOUNTER — COMMUNICATION - HEALTHEAST (OUTPATIENT)
Dept: PALLIATIVE MEDICINE | Facility: OTHER | Age: 54
End: 2019-06-05

## 2019-06-05 DIAGNOSIS — F43.23 ADJUSTMENT DISORDER WITH MIXED ANXIETY AND DEPRESSED MOOD: ICD-10-CM

## 2019-06-10 ENCOUNTER — AMBULATORY - HEALTHEAST (OUTPATIENT)
Dept: PALLIATIVE MEDICINE | Facility: OTHER | Age: 54
End: 2019-06-10

## 2019-06-14 ASSESSMENT — MIFFLIN-ST. JEOR: SCORE: 1426.42

## 2019-06-17 ASSESSMENT — MIFFLIN-ST. JEOR: SCORE: 1426.42

## 2019-06-19 ENCOUNTER — SURGERY - HEALTHEAST (OUTPATIENT)
Dept: FAMILY MEDICINE | Facility: CLINIC | Age: 54
End: 2019-06-19

## 2019-06-19 ENCOUNTER — SURGERY - HEALTHEAST (OUTPATIENT)
Dept: SURGERY | Facility: CLINIC | Age: 54
End: 2019-06-19

## 2019-06-19 ENCOUNTER — ANESTHESIA - HEALTHEAST (OUTPATIENT)
Dept: SURGERY | Facility: CLINIC | Age: 54
End: 2019-06-19

## 2019-06-19 ASSESSMENT — MIFFLIN-ST. JEOR: SCORE: 1464.98

## 2019-06-24 ENCOUNTER — COMMUNICATION - HEALTHEAST (OUTPATIENT)
Dept: PALLIATIVE MEDICINE | Facility: OTHER | Age: 54
End: 2019-06-24

## 2019-06-24 DIAGNOSIS — F11.90 CHRONIC, CONTINUOUS USE OF OPIOIDS: ICD-10-CM

## 2019-06-24 DIAGNOSIS — Z98.890 STATUS POST HARDWARE REMOVAL: ICD-10-CM

## 2019-06-24 DIAGNOSIS — G89.29 CHRONIC PAIN: ICD-10-CM

## 2019-06-24 DIAGNOSIS — Z98.890 S/P ANKLE LIGAMENT REPAIR: ICD-10-CM

## 2019-06-24 DIAGNOSIS — F32.A DEPRESSION, UNSPECIFIED DEPRESSION TYPE: ICD-10-CM

## 2019-06-24 DIAGNOSIS — Z98.1 STATUS POST ANKLE FUSION: ICD-10-CM

## 2019-06-24 DIAGNOSIS — M25.572 CHRONIC PAIN OF LEFT ANKLE: ICD-10-CM

## 2019-06-24 DIAGNOSIS — G89.18 POSTOPERATIVE PAIN: ICD-10-CM

## 2019-06-24 DIAGNOSIS — Z98.890 S/P BONE GRAFT: ICD-10-CM

## 2019-06-24 DIAGNOSIS — G89.29 CHRONIC PAIN OF LEFT ANKLE: ICD-10-CM

## 2019-07-05 ENCOUNTER — RECORDS - HEALTHEAST (OUTPATIENT)
Dept: ADMINISTRATIVE | Facility: OTHER | Age: 54
End: 2019-07-05

## 2019-07-11 ENCOUNTER — COMMUNICATION - HEALTHEAST (OUTPATIENT)
Dept: PALLIATIVE MEDICINE | Facility: OTHER | Age: 54
End: 2019-07-11

## 2019-07-22 ENCOUNTER — COMMUNICATION - HEALTHEAST (OUTPATIENT)
Dept: PALLIATIVE MEDICINE | Facility: OTHER | Age: 54
End: 2019-07-22

## 2019-07-22 DIAGNOSIS — F11.90 CHRONIC, CONTINUOUS USE OF OPIOIDS: ICD-10-CM

## 2019-07-23 ENCOUNTER — HOSPITAL ENCOUNTER (OUTPATIENT)
Dept: PALLIATIVE MEDICINE | Facility: OTHER | Age: 54
Discharge: HOME | End: 2019-07-23
Attending: ANESTHESIOLOGY

## 2019-07-23 DIAGNOSIS — G89.4 CHRONIC PAIN SYNDROME: ICD-10-CM

## 2019-07-23 DIAGNOSIS — F11.90 CHRONIC, CONTINUOUS USE OF OPIOIDS: ICD-10-CM

## 2019-07-23 ASSESSMENT — MIFFLIN-ST. JEOR: SCORE: 1464.98

## 2019-08-19 ASSESSMENT — MIFFLIN-ST. JEOR: SCORE: 1426.42

## 2019-08-20 ENCOUNTER — ANESTHESIA - HEALTHEAST (OUTPATIENT)
Dept: SURGERY | Facility: CLINIC | Age: 54
End: 2019-08-20

## 2019-08-21 ENCOUNTER — SURGERY - HEALTHEAST (OUTPATIENT)
Dept: SURGERY | Facility: CLINIC | Age: 54
End: 2019-08-21

## 2019-08-21 ASSESSMENT — MIFFLIN-ST. JEOR
SCORE: 1452.73
SCORE: 1452.73

## 2019-08-23 ENCOUNTER — HOME CARE/HOSPICE - HEALTHEAST (OUTPATIENT)
Dept: HOME HEALTH SERVICES | Facility: HOME HEALTH | Age: 54
End: 2019-08-23

## 2019-08-23 ENCOUNTER — COMMUNICATION - HEALTHEAST (OUTPATIENT)
Dept: PALLIATIVE MEDICINE | Facility: OTHER | Age: 54
End: 2019-08-23

## 2019-08-23 DIAGNOSIS — M25.572 CHRONIC PAIN OF LEFT ANKLE: ICD-10-CM

## 2019-08-23 DIAGNOSIS — G89.29 CHRONIC PAIN OF LEFT ANKLE: ICD-10-CM

## 2019-08-27 ENCOUNTER — HOME CARE/HOSPICE - HEALTHEAST (OUTPATIENT)
Dept: HOME HEALTH SERVICES | Facility: HOME HEALTH | Age: 54
End: 2019-08-27

## 2019-08-27 ENCOUNTER — RECORDS - HEALTHEAST (OUTPATIENT)
Dept: ADMINISTRATIVE | Facility: OTHER | Age: 54
End: 2019-08-27

## 2019-08-28 ENCOUNTER — HOME CARE/HOSPICE - HEALTHEAST (OUTPATIENT)
Dept: HOME HEALTH SERVICES | Facility: HOME HEALTH | Age: 54
End: 2019-08-28

## 2019-08-29 ENCOUNTER — HOME CARE/HOSPICE - HEALTHEAST (OUTPATIENT)
Dept: HOME HEALTH SERVICES | Facility: HOME HEALTH | Age: 54
End: 2019-08-29

## 2019-09-04 ENCOUNTER — HOME CARE/HOSPICE - HEALTHEAST (OUTPATIENT)
Dept: HOME HEALTH SERVICES | Facility: HOME HEALTH | Age: 54
End: 2019-09-04

## 2019-09-06 ENCOUNTER — HOME CARE/HOSPICE - HEALTHEAST (OUTPATIENT)
Dept: HOME HEALTH SERVICES | Facility: HOME HEALTH | Age: 54
End: 2019-09-06

## 2019-09-10 ENCOUNTER — HOME CARE/HOSPICE - HEALTHEAST (OUTPATIENT)
Dept: HOME HEALTH SERVICES | Facility: HOME HEALTH | Age: 54
End: 2019-09-10

## 2019-09-12 ENCOUNTER — HOME CARE/HOSPICE - HEALTHEAST (OUTPATIENT)
Dept: HOME HEALTH SERVICES | Facility: HOME HEALTH | Age: 54
End: 2019-09-12

## 2019-09-16 ENCOUNTER — HOME CARE/HOSPICE - HEALTHEAST (OUTPATIENT)
Dept: HOME HEALTH SERVICES | Facility: HOME HEALTH | Age: 54
End: 2019-09-16

## 2019-09-17 ENCOUNTER — HOSPITAL ENCOUNTER (OUTPATIENT)
Dept: PALLIATIVE MEDICINE | Facility: OTHER | Age: 54
Discharge: HOME | End: 2019-09-17
Attending: ANESTHESIOLOGY

## 2019-09-17 DIAGNOSIS — Z98.1 STATUS POST ANKLE FUSION: ICD-10-CM

## 2019-09-17 DIAGNOSIS — F32.A DEPRESSION, UNSPECIFIED DEPRESSION TYPE: ICD-10-CM

## 2019-09-17 DIAGNOSIS — Z98.890 STATUS POST HARDWARE REMOVAL: ICD-10-CM

## 2019-09-17 DIAGNOSIS — Z98.890 S/P BONE GRAFT: ICD-10-CM

## 2019-09-17 DIAGNOSIS — G89.18 POSTOPERATIVE PAIN: ICD-10-CM

## 2019-09-17 DIAGNOSIS — F11.90 CHRONIC, CONTINUOUS USE OF OPIOIDS: ICD-10-CM

## 2019-09-17 DIAGNOSIS — G89.29 CHRONIC PAIN OF LEFT ANKLE: ICD-10-CM

## 2019-09-17 DIAGNOSIS — Z98.890 S/P ANKLE LIGAMENT REPAIR: ICD-10-CM

## 2019-09-17 DIAGNOSIS — M25.572 CHRONIC PAIN OF LEFT ANKLE: ICD-10-CM

## 2019-09-17 DIAGNOSIS — G89.4 CHRONIC PAIN SYNDROME: ICD-10-CM

## 2019-09-17 ASSESSMENT — MIFFLIN-ST. JEOR: SCORE: 1449.1

## 2019-09-23 ENCOUNTER — COMMUNICATION - HEALTHEAST (OUTPATIENT)
Dept: PALLIATIVE MEDICINE | Facility: OTHER | Age: 54
End: 2019-09-23

## 2019-09-23 DIAGNOSIS — G89.29 CHRONIC PAIN OF LEFT ANKLE: ICD-10-CM

## 2019-09-23 DIAGNOSIS — M25.572 CHRONIC PAIN OF LEFT ANKLE: ICD-10-CM

## 2019-11-15 ENCOUNTER — COMMUNICATION - HEALTHEAST (OUTPATIENT)
Dept: PALLIATIVE MEDICINE | Facility: OTHER | Age: 54
End: 2019-11-15

## 2019-11-15 DIAGNOSIS — F11.90 CHRONIC, CONTINUOUS USE OF OPIOIDS: ICD-10-CM

## 2019-11-18 ENCOUNTER — COMMUNICATION - HEALTHEAST (OUTPATIENT)
Dept: PALLIATIVE MEDICINE | Facility: OTHER | Age: 54
End: 2019-11-18

## 2019-12-04 ENCOUNTER — COMMUNICATION - HEALTHEAST (OUTPATIENT)
Dept: PALLIATIVE MEDICINE | Facility: OTHER | Age: 54
End: 2019-12-04

## 2019-12-04 DIAGNOSIS — G89.4 CHRONIC PAIN SYNDROME: ICD-10-CM

## 2019-12-16 ENCOUNTER — HOSPITAL ENCOUNTER (OUTPATIENT)
Dept: PALLIATIVE MEDICINE | Facility: OTHER | Age: 54
Discharge: HOME | End: 2019-12-16
Attending: ANESTHESIOLOGY

## 2020-01-10 ENCOUNTER — COMMUNICATION - HEALTHEAST (OUTPATIENT)
Dept: PALLIATIVE MEDICINE | Facility: OTHER | Age: 55
End: 2020-01-10

## 2020-01-10 DIAGNOSIS — M25.572 CHRONIC PAIN OF LEFT ANKLE: ICD-10-CM

## 2020-01-10 DIAGNOSIS — G89.29 CHRONIC PAIN OF LEFT ANKLE: ICD-10-CM

## 2020-01-14 ENCOUNTER — COMMUNICATION - HEALTHEAST (OUTPATIENT)
Dept: PALLIATIVE MEDICINE | Facility: OTHER | Age: 55
End: 2020-01-14

## 2020-01-16 ENCOUNTER — COMMUNICATION - HEALTHEAST (OUTPATIENT)
Dept: PALLIATIVE MEDICINE | Facility: OTHER | Age: 55
End: 2020-01-16

## 2020-01-16 DIAGNOSIS — G89.29 CHRONIC PAIN OF LEFT ANKLE: ICD-10-CM

## 2020-01-16 DIAGNOSIS — M25.572 CHRONIC PAIN OF LEFT ANKLE: ICD-10-CM

## 2020-01-17 ENCOUNTER — RECORDS - HEALTHEAST (OUTPATIENT)
Dept: ADMINISTRATIVE | Facility: OTHER | Age: 55
End: 2020-01-17

## 2020-01-30 ENCOUNTER — RECORDS - HEALTHEAST (OUTPATIENT)
Dept: ADMINISTRATIVE | Facility: OTHER | Age: 55
End: 2020-01-30

## 2020-01-31 ENCOUNTER — HOSPITAL ENCOUNTER (OUTPATIENT)
Dept: PALLIATIVE MEDICINE | Facility: OTHER | Age: 55
Discharge: HOME | End: 2020-01-31
Attending: ANESTHESIOLOGY

## 2020-01-31 DIAGNOSIS — F32.A DEPRESSION, UNSPECIFIED DEPRESSION TYPE: ICD-10-CM

## 2020-01-31 DIAGNOSIS — G89.4 CHRONIC PAIN SYNDROME: ICD-10-CM

## 2020-01-31 DIAGNOSIS — M17.11 ARTHRITIS OF RIGHT KNEE: ICD-10-CM

## 2020-01-31 ASSESSMENT — MIFFLIN-ST. JEOR: SCORE: 1543.9

## 2020-02-14 ENCOUNTER — RECORDS - HEALTHEAST (OUTPATIENT)
Dept: ADMINISTRATIVE | Facility: OTHER | Age: 55
End: 2020-02-14

## 2020-02-14 ASSESSMENT — MIFFLIN-ST. JEOR: SCORE: 1501.75

## 2020-03-19 ENCOUNTER — COMMUNICATION - HEALTHEAST (OUTPATIENT)
Dept: PALLIATIVE MEDICINE | Facility: OTHER | Age: 55
End: 2020-03-19

## 2020-03-19 DIAGNOSIS — F32.A DEPRESSION, UNSPECIFIED DEPRESSION TYPE: ICD-10-CM

## 2020-03-19 DIAGNOSIS — M25.572 CHRONIC PAIN OF LEFT ANKLE: ICD-10-CM

## 2020-03-19 DIAGNOSIS — G89.29 CHRONIC PAIN OF LEFT ANKLE: ICD-10-CM

## 2020-03-23 ENCOUNTER — COMMUNICATION - HEALTHEAST (OUTPATIENT)
Dept: PALLIATIVE MEDICINE | Facility: OTHER | Age: 55
End: 2020-03-23

## 2020-03-23 DIAGNOSIS — G89.4 CHRONIC PAIN SYNDROME: ICD-10-CM

## 2020-04-01 ENCOUNTER — COMMUNICATION - HEALTHEAST (OUTPATIENT)
Dept: PALLIATIVE MEDICINE | Facility: OTHER | Age: 55
End: 2020-04-01

## 2020-04-01 DIAGNOSIS — G89.29 CHRONIC PAIN OF LEFT ANKLE: ICD-10-CM

## 2020-04-01 DIAGNOSIS — M25.572 CHRONIC PAIN OF LEFT ANKLE: ICD-10-CM

## 2020-04-28 ENCOUNTER — HOSPITAL ENCOUNTER (OUTPATIENT)
Dept: PALLIATIVE MEDICINE | Facility: OTHER | Age: 55
Discharge: HOME | End: 2020-04-28
Attending: ANESTHESIOLOGY

## 2020-04-28 DIAGNOSIS — G89.4 CHRONIC PAIN SYNDROME: ICD-10-CM

## 2020-05-20 ENCOUNTER — SURGERY - HEALTHEAST (OUTPATIENT)
Dept: SURGERY | Facility: CLINIC | Age: 55
End: 2020-05-20
Payer: MEDICAID

## 2020-06-05 ENCOUNTER — COMMUNICATION - HEALTHEAST (OUTPATIENT)
Dept: PALLIATIVE MEDICINE | Facility: OTHER | Age: 55
End: 2020-06-05

## 2020-06-05 DIAGNOSIS — M25.572 CHRONIC PAIN OF LEFT ANKLE: ICD-10-CM

## 2020-06-05 DIAGNOSIS — G89.29 CHRONIC PAIN OF LEFT ANKLE: ICD-10-CM

## 2020-06-10 ENCOUNTER — COMMUNICATION - HEALTHEAST (OUTPATIENT)
Dept: PALLIATIVE MEDICINE | Facility: OTHER | Age: 55
End: 2020-06-10

## 2020-06-10 DIAGNOSIS — F32.A DEPRESSION, UNSPECIFIED DEPRESSION TYPE: ICD-10-CM

## 2020-06-19 ENCOUNTER — COMMUNICATION - HEALTHEAST (OUTPATIENT)
Dept: PALLIATIVE MEDICINE | Facility: OTHER | Age: 55
End: 2020-06-19

## 2020-06-29 ENCOUNTER — HOSPITAL ENCOUNTER (OUTPATIENT)
Dept: PALLIATIVE MEDICINE | Facility: OTHER | Age: 55
Discharge: HOME | End: 2020-06-29
Attending: ANESTHESIOLOGY

## 2020-06-29 DIAGNOSIS — G89.4 CHRONIC PAIN SYNDROME: ICD-10-CM

## 2020-07-16 ENCOUNTER — COMMUNICATION - HEALTHEAST (OUTPATIENT)
Dept: PALLIATIVE MEDICINE | Facility: OTHER | Age: 55
End: 2020-07-16

## 2020-07-16 DIAGNOSIS — G89.29 CHRONIC PAIN OF LEFT ANKLE: ICD-10-CM

## 2020-07-16 DIAGNOSIS — M25.572 CHRONIC PAIN OF LEFT ANKLE: ICD-10-CM

## 2020-07-28 ENCOUNTER — COMMUNICATION - HEALTHEAST (OUTPATIENT)
Dept: PALLIATIVE MEDICINE | Facility: OTHER | Age: 55
End: 2020-07-28

## 2020-07-28 DIAGNOSIS — M25.572 CHRONIC PAIN OF LEFT ANKLE: ICD-10-CM

## 2020-07-28 DIAGNOSIS — F32.A DEPRESSION, UNSPECIFIED DEPRESSION TYPE: ICD-10-CM

## 2020-07-28 DIAGNOSIS — G89.29 CHRONIC PAIN OF LEFT ANKLE: ICD-10-CM

## 2020-09-21 ENCOUNTER — COMMUNICATION - HEALTHEAST (OUTPATIENT)
Dept: PALLIATIVE MEDICINE | Facility: OTHER | Age: 55
End: 2020-09-21

## 2020-09-21 DIAGNOSIS — G89.29 CHRONIC PAIN OF LEFT ANKLE: ICD-10-CM

## 2020-09-21 DIAGNOSIS — M25.572 CHRONIC PAIN OF LEFT ANKLE: ICD-10-CM

## 2020-09-22 ENCOUNTER — COMMUNICATION - HEALTHEAST (OUTPATIENT)
Dept: PALLIATIVE MEDICINE | Facility: OTHER | Age: 55
End: 2020-09-22

## 2020-09-24 ENCOUNTER — COMMUNICATION - HEALTHEAST (OUTPATIENT)
Dept: PALLIATIVE MEDICINE | Facility: OTHER | Age: 55
End: 2020-09-24

## 2020-10-28 ENCOUNTER — HOSPITAL ENCOUNTER (OUTPATIENT)
Dept: PALLIATIVE MEDICINE | Facility: OTHER | Age: 55
Discharge: HOME | End: 2020-10-28
Attending: ANESTHESIOLOGY

## 2020-10-28 DIAGNOSIS — F32.A DEPRESSION, UNSPECIFIED DEPRESSION TYPE: ICD-10-CM

## 2020-10-28 DIAGNOSIS — M25.572 CHRONIC PAIN OF LEFT ANKLE: ICD-10-CM

## 2020-10-28 DIAGNOSIS — G89.29 CHRONIC PAIN OF LEFT ANKLE: ICD-10-CM

## 2020-10-28 DIAGNOSIS — G89.4 CHRONIC PAIN SYNDROME: ICD-10-CM

## 2020-10-28 ASSESSMENT — MIFFLIN-ST. JEOR: SCORE: 1365.19

## 2020-10-30 ENCOUNTER — COMMUNICATION - HEALTHEAST (OUTPATIENT)
Dept: PALLIATIVE MEDICINE | Facility: OTHER | Age: 55
End: 2020-10-30

## 2020-12-11 ENCOUNTER — COMMUNICATION - HEALTHEAST (OUTPATIENT)
Dept: PALLIATIVE MEDICINE | Facility: OTHER | Age: 55
End: 2020-12-11

## 2020-12-14 ENCOUNTER — RECORDS - HEALTHEAST (OUTPATIENT)
Dept: ADMINISTRATIVE | Facility: OTHER | Age: 55
End: 2020-12-14

## 2020-12-29 ENCOUNTER — COMMUNICATION - HEALTHEAST (OUTPATIENT)
Dept: PALLIATIVE MEDICINE | Facility: OTHER | Age: 55
End: 2020-12-29

## 2020-12-29 DIAGNOSIS — F32.A DEPRESSION, UNSPECIFIED DEPRESSION TYPE: ICD-10-CM

## 2021-02-05 ENCOUNTER — RECORDS - HEALTHEAST (OUTPATIENT)
Dept: PALLIATIVE MEDICINE | Facility: OTHER | Age: 56
End: 2021-02-05

## 2021-02-10 ENCOUNTER — HOSPITAL ENCOUNTER (OUTPATIENT)
Dept: PALLIATIVE MEDICINE | Facility: OTHER | Age: 56
Discharge: HOME | End: 2021-02-10
Attending: ANESTHESIOLOGY

## 2021-02-10 DIAGNOSIS — G89.29 CHRONIC PAIN OF LEFT ANKLE: ICD-10-CM

## 2021-02-10 DIAGNOSIS — G89.4 CHRONIC PAIN SYNDROME: ICD-10-CM

## 2021-02-10 DIAGNOSIS — M25.572 CHRONIC PAIN OF LEFT ANKLE: ICD-10-CM

## 2021-02-10 DIAGNOSIS — F32.A DEPRESSION, UNSPECIFIED DEPRESSION TYPE: ICD-10-CM

## 2021-03-12 ENCOUNTER — COMMUNICATION - HEALTHEAST (OUTPATIENT)
Dept: PALLIATIVE MEDICINE | Facility: OTHER | Age: 56
End: 2021-03-12

## 2021-03-17 ENCOUNTER — COMMUNICATION - HEALTHEAST (OUTPATIENT)
Dept: PALLIATIVE MEDICINE | Facility: OTHER | Age: 56
End: 2021-03-17

## 2021-03-17 DIAGNOSIS — G89.4 CHRONIC PAIN SYNDROME: ICD-10-CM

## 2021-05-07 ENCOUNTER — HOSPITAL ENCOUNTER (OUTPATIENT)
Dept: PALLIATIVE MEDICINE | Facility: OTHER | Age: 56
Discharge: HOME | End: 2021-05-07
Attending: ANESTHESIOLOGY
Payer: MEDICAID

## 2021-05-07 DIAGNOSIS — F32.A DEPRESSION, UNSPECIFIED DEPRESSION TYPE: ICD-10-CM

## 2021-05-07 DIAGNOSIS — G89.29 CHRONIC PAIN OF LEFT ANKLE: ICD-10-CM

## 2021-05-07 DIAGNOSIS — M17.11 ARTHRITIS OF RIGHT KNEE: ICD-10-CM

## 2021-05-07 DIAGNOSIS — G89.4 CHRONIC PAIN SYNDROME: ICD-10-CM

## 2021-05-07 DIAGNOSIS — M25.572 CHRONIC PAIN OF LEFT ANKLE: ICD-10-CM

## 2021-05-07 ASSESSMENT — MIFFLIN-ST. JEOR: SCORE: 1424.15

## 2021-05-11 LAB
6MAM UR QL: NOT DETECTED
7AMINOCLONAZEPAM UR QL: NOT DETECTED
A-OH ALPRAZ UR QL: NOT DETECTED
ALPHA-OH-MIDAZOLAM (CUTOFF 20 NG/ML) - HISTORICAL: NOT DETECTED
ALPRAZ UR QL: NOT DETECTED
AMPHET UR QL SCN: PRESENT
BARBITURATES UR QL: NOT DETECTED
BUPRENORPHINE UR QL: NOT DETECTED
BZE UR QL: NOT DETECTED
CARBOXYTHC UR QL: PRESENT
CARISOPRODOL UR QL: PRESENT
CLONAZEPAM UR QL: NOT DETECTED
CODEINE UR QL: NOT DETECTED
CREAT UR-MCNC: 101.6 MG/DL (ref 20–400)
DIAZEPAM UR QL: NOT DETECTED
ETHYL GLUCURONIDE UR QL: NOT DETECTED
FENTANYL UR QL: NOT DETECTED
GABAPENTIN (CUTOFF 100 NG/ML) - HISTORICAL: PRESENT
HYDROCODONE UR QL: NOT DETECTED
HYDROMORPHONE UR QL: NOT DETECTED
LORAZEPAM UR QL: NOT DETECTED
MDA UR QL: NOT DETECTED
MDEA UR QL: NOT DETECTED
MDMA UR QL: NOT DETECTED
ME-PHENIDATE UR QL: NOT DETECTED
MEPERIDINE UR QL: NOT DETECTED
METHADONE UR QL: PRESENT
METHAMPHET UR QL: NOT DETECTED
MIDAZOLAM UR QL SCN: NOT DETECTED
MORPHINE UR QL: NOT DETECTED
NALOXONE (CUTOFF 100 NG/ML) - HISTORICAL: NOT DETECTED
NORBUPRENORPHINE UR QL CFM: NOT DETECTED
NORDIAZEPAM UR QL: NOT DETECTED
NORFENTANYL UR QL: NOT DETECTED
NORHYDROCODONE UR QL CFM: NOT DETECTED
NOROXYCODONE UR QL CFM: NOT DETECTED
NOROXYMORPHONE UR QL SCN: NOT DETECTED
OXAZEPAM UR QL: NOT DETECTED
OXYCODONE UR QL: NOT DETECTED
OXYMORPHONE UR QL: NOT DETECTED
PATHOLOGY STUDY: NORMAL
PCP UR QL: NOT DETECTED
PHENTERMINE UR QL: NOT DETECTED
PREGABALIN (CUTOFF 100 NG/ML) - HISTORICAL: NOT DETECTED
SERVICE CMNT-IMP: NORMAL
TAPENTADOL UR QL SCN: NOT DETECTED
TAPENTADOL UR QL SCN: NOT DETECTED
TEMAZEPAM UR QL: NOT DETECTED
TRAMADOL UR QL: NOT DETECTED
ZOLPIDEM METABOLITE (CUTOFF 100 NG/ML) - HISTORICAL: NOT DETECTED
ZOLPIDEM UR QL: NOT DETECTED

## 2021-05-17 ENCOUNTER — COMMUNICATION - HEALTHEAST (OUTPATIENT)
Dept: PALLIATIVE MEDICINE | Facility: OTHER | Age: 56
End: 2021-05-17

## 2021-05-17 DIAGNOSIS — G89.29 CHRONIC PAIN OF LEFT ANKLE: ICD-10-CM

## 2021-05-17 DIAGNOSIS — M25.572 CHRONIC PAIN OF LEFT ANKLE: ICD-10-CM

## 2021-05-25 ENCOUNTER — RECORDS - HEALTHEAST (OUTPATIENT)
Dept: ADMINISTRATIVE | Facility: CLINIC | Age: 56
End: 2021-05-25

## 2021-05-26 VITALS — DIASTOLIC BLOOD PRESSURE: 83 MMHG | SYSTOLIC BLOOD PRESSURE: 129 MMHG

## 2021-05-27 VITALS — WEIGHT: 178 LBS | BODY MASS INDEX: 28.61 KG/M2 | HEIGHT: 66 IN

## 2021-05-27 NOTE — TELEPHONE ENCOUNTER
Prior Authorization Request  Who s requesting:  Emeterio Ramirez MD/ Jennifer Brewer CMA  Pharmacy Name and Location: [facility]  Medication Name: [facility]  Insurance Plan: [place] Medicaid  Insurance Member ID Number:  [id number]  Informed patient that prior authorizations can take up to 10 business days for response:   Yes  Okay to leave a detailed message: Yes

## 2021-05-27 NOTE — TELEPHONE ENCOUNTER
Central PA team  [phone]  Pool: HE PA MED ([id number])          PA has been initiated.       PA form completed and faxed insurance via Cover My Meds     Key: [id number]     Medication:  DULoxetine HCl 60MG     Insurance:  [place] Medicaid         Response will be received via fax and may take up to 5-10 business days depending on plan

## 2021-05-28 NOTE — TELEPHONE ENCOUNTER
Prior Authorization Request  Who s requesting:  GLADIS Solano CMA  Pharmacy Name and Location:   [facility]  Medication Name:   Duloxetine DR 60mg Caps  Insurance Plan:   [employer]  Insurance Member ID Number:   [id number]  Informed patient that prior authorizations can take up to 10 business days for response:   Yes  Okay to leave a detailed message: Yes

## 2021-05-28 NOTE — TELEPHONE ENCOUNTER
Pt is scheduled for 06-10-19 and on the cancellation list for anything before 05-15-19, she is having surgery that day

## 2021-05-28 NOTE — TELEPHONE ENCOUNTER
Refill request: colace and bupropion.  Patient did not come in for follow up on 4/19.  Last office visit: 1/17/2019  No follow up scheduled

## 2021-05-28 NOTE — TELEPHONE ENCOUNTER
Medication being requested: senexon-s 8.6-50mg tab and vitamin D2 50,000iu  Last visit date:1/17/19 with Dr. Ramirez  Next visit date: 6/10/19 with Dr. Ramirez  Expected follow up: 4-5 weeks with Dr. Ramirez or Fernando BAXTER visit (Pain Center) date: no  Red Flags/Comments identified on call: did not make appt with etienne or Dr. Ramirez in 4-5 week. Pt ns on 4/19/19  Pertinent between visit information about requested medication (telephone, mychart, prior authorization): has surgery scheduled for 5/15  Script being sent to provider by nurse- dates and quantity:   Requested Prescriptions     Pending Prescriptions Disp Refills     senna-docusate (SENNA PLUS) 8.6-50 mg tablet 90 tablet 0     Sig: TAKE 1 TABLET BY MOUTH THREE TIMES DAILY AS NEEDED FOR CONSTIPATION     ergocalciferol (ERGOCALCIFEROL) 50,000 unit capsule 4 capsule 0     Sig: TAKE 1 CAPSULE BY MOUTH EVERY 7 DAYS     Pharmacy cued: [facility] on [address]  Standing orders for withdrawal protocol implemented: na

## 2021-05-29 NOTE — ANESTHESIA PREPROCEDURE EVALUATION
Anesthesia Evaluation      Patient summary reviewed   No history of anesthetic complications     Airway   Mallampati: II  Neck ROM: full   Pulmonary - negative ROS and normal exam   (+) a smoker                         Cardiovascular - normal exam  (+) hypertension, ,      Neuro/Psych - negative ROS   (+) depression, chronic pain    Endo/Other - negative ROS   (+) arthritis,      GI/Hepatic/Renal    (+) GERD,   chronic renal disease,           Dental - normal exam                          Anesthesia Plan  Planned anesthetic: peripheral nerve block and spinal    ASA 2     Anesthetic plan and risks discussed with: patient  Anesthesia plan special considerations: antiemetics,   Post-op plan: routine recovery

## 2021-05-29 NOTE — ANESTHESIA PROCEDURE NOTES
Peripheral Block    Patient location during procedure: pre-op  Start time: 6/19/2019 2:08 PM  End time: 6/19/2019 2:12 PM  post-op analgesia per surgeon order as noted in medical record  Staffing:  Performing  Anesthesiologist: Romaine Morrison MD  Preanesthetic Checklist  Completed: patient identified, site marked, risks, benefits, and alternatives discussed, timeout performed, consent obtained, airway assessed, oxygen available, suction available, emergency drugs available and hand hygiene performed  Peripheral Block  Block type: saphenous, adductor canal block  Prep: ChloraPrep  Patient position: sitting  Patient monitoring: cardiac monitor, continuous pulse oximetry, heart rate and blood pressure  Laterality: left  Injection technique: ultrasound guided    Ultrasound used to visualize needle placement in proximity to nerve being blocked: yes         Needle  Needle type: Stimuplex   Needle gauge: 20G  Needle length: 4 in  no peripheral nerve catheter placed  Assessment  Injection assessment: no difficulty with injection, negative aspiration for heme, no paresthesia on injection and incremental injection

## 2021-05-29 NOTE — ANESTHESIA PROCEDURE NOTES
Spinal Block    Patient location during procedure: OR  Start time: 6/19/2019 3:25 PM  End time: 6/19/2019 3:29 PM  Reason for block: primary anesthetic    Staffing:  Performing  Anesthesiologist: Romaine Morrison MD    Preanesthetic Checklist  Completed: patient identified, risks, benefits, and alternatives discussed, timeout performed, consent obtained, airway assessed, oxygen available, suction available, emergency drugs available and hand hygiene performed  Spinal Block  Patient position: sitting  Prep: Betadine  Patient monitoring: heart rate, continuous pulse ox and blood pressure  Approach: midline  Location: L2-3  Injection technique: single-shot  Needle type: pencil-tip   Needle gauge: 24 G    Assessment  Sensory level: T8

## 2021-05-29 NOTE — TELEPHONE ENCOUNTER
Refill request: bupropion  mg  Last prescribed: 6/4 by BE  Last ov:1/17 with BE  No show on 4/19 with BE  Next ov:7/23 with BE    Requested Prescriptions     Pending Prescriptions Disp Refills     buPROPion (WELLBUTRIN XL) 300 MG 24 hr tablet 30 tablet 0     Sig: Take 1 tablet (300 mg total) by mouth daily.

## 2021-05-29 NOTE — TELEPHONE ENCOUNTER
Refill request: bupropion 300 mg XL  Last ov:1/17/2019  Next ov:6/10/2019  Cued for: Dr. Ramirez

## 2021-05-29 NOTE — ANESTHESIA CARE TRANSFER NOTE
Last vitals:   Vitals:    06/19/19 1700   BP: 132/82   Pulse: 92   Resp: 12   Temp: 36.8  C (98.3  F)   SpO2: 92%     Patient's level of consciousness is awake and drowsy  Spontaneous respirations: yes  Maintains airway independently: yes  Dentition unchanged: yes  Oropharynx: oropharynx clear of all foreign objects    QCDR Measures:  ASA# 20 - Surgical Safety Checklist: WHO surgical safety checklist completed prior to induction    PQRS# 430 - Adult PONV Prevention: 4558F - Pt received => 2 anti-emetic agents (different classes) preop & intraop  ASA# 8 - Peds PONV Prevention: NA - Not pediatric patient, not GA or 2 or more risk factors NOT present  PQRS# 424 - Vania-op Temp Management: 4559F - At least one body temp DOCUMENTED => 35.5C or 95.9F within required timeframe  PQRS# 426 - PACU Transfer Protocol: - Transfer of care checklist used  ASA# 14 - Acute Post-op Pain: ASA14A - Patient experienced pain >= 7 out of 10

## 2021-05-29 NOTE — TELEPHONE ENCOUNTER
Medication being requested: vitamin D3 53943 units. 1 tab a week #4  Last visit date: 1/17/19 with Dr. Ramirez  Next visit date: 6/10/19 with Dr. Ramirez  Expected follow up:4-5 weeks  Pertinent between visit information about requested medication (telephone, mychart, prior authorization, concerns, comments): ns on 2/14 &4/19  Script being sent to provider by nurse- dates and quantity:   Requested Prescriptions     Pending Prescriptions Disp Refills     ergocalciferol (ERGOCALCIFEROL) 50,000 unit capsule 4 capsule 0     Sig: Take 1 capsule (50,000 Units total) by mouth every 7 days.     [facility] orders for withdrawal protocol implemented: vik

## 2021-05-29 NOTE — TELEPHONE ENCOUNTER
Medication being requested: lamotrigine 25 mg  Last visit date: 1/17 Provider: MANUEL  No show on 4/19  Next visit date: 6/10 Provider: BE  Expected follow up: 8 weeks  MTM visit (Pain Center) date: no  Pertinent between visit information about requested medication (telephone, mychart, prior authorization, concerns):   Non compliant with follow up visits  Last date prescribed: 5/15  Provider responsible: BE  Spoke with patient: no  Script being sent to provider by nurse- dates and quantity:   Requested Prescriptions     Pending Prescriptions Disp Refills     lamoTRIgine (LAMICTAL) 25 MG tablet [Pharmacy Med Name: LAMOTRIGINE 25MG TABLETS] 120 tablet 0     Sig: TAKE 2 TABLETS BY MOUTH TWICE DAILY     Pharmacy cued: [facility]

## 2021-05-29 NOTE — ANESTHESIA POSTPROCEDURE EVALUATION
Patient: Jaime L Franklin Behrends  LEFT TOTAL KNEE ARTHROPLASTY  Anesthesia type: spinal    Patient location: PACU  Last vitals:   Vitals Value Taken Time   /76 6/19/2019  5:50 PM   Temp 36.6  C (97.8  F) 6/19/2019  5:40 PM   Pulse 89 6/19/2019  5:58 PM   Resp 14 6/19/2019  5:58 PM   SpO2 100 % 6/19/2019  5:58 PM   Vitals shown include unvalidated device data.  Post vital signs: stable  Level of consciousness: awake and responds to simple questions  Post-anesthesia pain: pain needs to be addressed; Methadone user  Post-anesthesia nausea and vomiting: no  Pulmonary: unassisted, return to baseline  Cardiovascular: stable and blood pressure at baseline  Hydration: adequate  Anesthetic events: no    QCDR Measures:  ASA# 11 - Vania-op Cardiac Arrest: ASA11B - Patient did NOT experience unanticipated cardiac arrest  ASA# 12 - Vania-op Mortality Rate: ASA12B - Patient did NOT die  ASA# 13 - PACU Re-Intubation Rate: NA - No ETT / LMA used for case  ASA# 10 - Composite Anes Safety: ASA10A - No serious adverse event    Additional Notes:

## 2021-05-30 NOTE — TELEPHONE ENCOUNTER
Medication being requested: Lamictal  Last visit date:01/17/19  Provider: MANUEL  Next visit date: Tomorrow Provider:BE  Expected follow up: 4-5 weeks with Dr HOFFMANN or Fernando BAXTER visit (Pain Center) date: No  Pertinent between visit information about requested medication (telephone, mychart, prior authorization, concerns): No-show- 02/14 and 04/19. CXL- 06/10. Non-compliant with plan of care. Lamictal is overdue. Total knee replacement- 06/19/19  Last date prescribed:05/28    Provider responsible: BE  Spoke with patient: No  Script being sent to provider by nurse- dates and quantity: Didn't queue as she's scheduled for follow-up tomorrow. Will queue if you wish  Pharmacy cued: [facility]

## 2021-05-30 NOTE — PATIENT INSTRUCTIONS - HE
PLAN:    Sign Dr. James MICHAEL to talk to therapist    Tizanadine 4 mg three tabs bedtime, repeat after four hours    We will register you for [place] Medical Cannabis program    Resume Lamictal, must follow taper up schedule one daily for two weeks, one twice a day two weeks, two morning and one bedtime two weeks, two twice a day    Return in 8 weeks

## 2021-05-30 NOTE — PROGRESS NOTES
Assessment/Plan:     Problem List Items Addressed This Visit     Chronic, continuous use of opioids    Relevant Medications    lamoTRIgine (LAMICTAL) 25 MG tablet      Other Visit Diagnoses     Chronic pain syndrome    -  Primary    Relevant Medications    gabapentin (NEURONTIN) 300 MG capsule    tiZANidine (ZANAFLEX) 4 MG tablet            No follow-ups on file.    Patient Instructions   PLAN:    Sign Dr. James MICHAEL to talk to therapist    Tizanadine 4 mg three tabs bedtime, repeat after four hours    We will register you for [place] Medical Cannabis program    Resume Lamictal, must follow taper up schedule one daily for two weeks, one twice a day two weeks, two morning and one bedtime two weeks, two twice a day    Return in 8 weeks        Subjective:       54 y.o. female presents for evaluation of knee pain.    Kain reviews having had a left knee replacement 6/19/2019.  Total therapy.  She is told this may help her left foot pain which she had a surgery 2 years ago.  That can still be swollen at the end of the day.  She feels she is recovering fairly well.  She notes they want to do her right knee replacement as soon as possible.    She reviews running out of the lamotrigine over the last 2 weeks, was taking 100 mg daily.  That is been helpful for her.    Review since last seen she did get involved with the methadone program and [place].  She is a counselor named Juice Claire, [phone].  She is taking 1 and 50 mg daily.  She describes the Suboxone was not helpful.  In the hospital when placed on tizanidine they checked an EKG which is doing well.  She is using the tizanidine 3 tablets at bedtime repeating 3 tablets.    She continues on Cymbalta 60 mg 3 times a day and Wellbutrin 300 mg daily.  Feels her mood is been doing fairly well.  The agoraphobia can still be a problem.    She acknowledged that opioids became an issue, was obtaining in other places.  Denies using IV or snorting.    She acknowledges  "using marijuana daily, up to $800 a month.  She would like to get involved in the medical cannabis program.  We reviewed the differences with medical cannabis versus street marijuana in terms of CBD properties and levels of THC.    Her  has moved back in the home, and part there are financial needs.  He continues drinking.  She is working on ways to manage it.    She continues with the [place] phobia, sometimes a sense of \"butterflies in her stomach, wanting to run.  This may occur a couple times a week.    Current Outpatient Medications:      amLODIPine (NORVASC) 10 MG tablet, Take 10 mg by mouth daily., Disp: , Rfl:      aspirin 325 MG tablet, Take 1 tablet (325 mg total) by mouth daily., Disp: 40 tablet, Rfl: 0     buPROPion (WELLBUTRIN XL) 300 MG 24 hr tablet, Take 1 tablet (300 mg total) by mouth daily., Disp: 30 tablet, Rfl: 0     DULoxetine (CYMBALTA) 60 MG capsule, Take 1 capsule (60 mg total) by mouth 3 (three) times a day., Disp: 90 capsule, Rfl: 2     ergocalciferol (ERGOCALCIFEROL) 50,000 unit capsule, Take 1 capsule (50,000 Units total) by mouth every 7 days., Disp: 4 capsule, Rfl: 0     gabapentin (NEURONTIN) 300 MG capsule, Take 2 capsules (600 mg total) by mouth 2 (two) times a day., Disp: 120 capsule, Rfl: 3     lamoTRIgine (LAMICTAL) 25 MG tablet, Take 25 mg by mouth 2 (two) times a day., Disp: , Rfl:      methadone HCl (METHADONE ORAL), Take 150 mg by mouth daily. Goes to pain clinic 0630 am everyday, Disp: , Rfl:      tiZANidine (ZANAFLEX) 4 MG tablet, Three tabs bedtime and may repeat after four hours, Disp: 180 tablet, Rfl: 2     varenicline (CHANTIX) 1 mg tablet, Take 1 mg by mouth 2 (two) times a day., Disp: , Rfl:      acetaminophen (TYLENOL) 500 MG tablet, Take 500 mg by mouth every 6 (six) hours as needed for pain., Disp: , Rfl:      acetaminophen (TYLENOL) 500 MG tablet, Take 2 tablets (1,000 mg total) by mouth 3 (three) times a day., Disp: , Rfl: 0     HYDROmorphone (DILAUDID) 2 MG " "tablet, Take 1-2 tablets (2-4 mg total) by mouth every 4 (four) hours., Disp: 40 tablet, Rfl: 0     lamoTRIgine (LAMICTAL) 25 MG tablet, Take 2 tablets (50 mg total) by mouth 2 (two) times a day. One daily 2 weeks, 2 daily 2 weeks, 2 morning one bedtime 2 weeks, 2 twice a day, Disp: 120 tablet, Rfl: 2     senna-docusate (SENNOSIDES-DOCUSATE SODIUM) 8.6-50 mg tablet, Take 1 tablet by mouth 2 (two) times a day as needed for constipation., Disp: , Rfl:   She is alert with a clear sensorium good eye contact.  Thought process tight logical.  Affect is fairly full range.    Her left knee does not have evidence of erythema swelling.         Objective:     Vitals:    07/23/19 0857   BP: 150/79   Pulse: 87   Weight: 183 lb 8 oz (83.2 kg)   Height: 5' 7\" (1.702 m)   PainSc:   4   PainLoc: Knee     Plan: She will sign release of information for me to speak to her therapist to coordinate care.    Case she is already spoken to them that are supportive of the medical cannabis program.    She was finding benefit the lamotrigine, given the 2 weeks labs, will gradually increase the lamotrigine back to 100 mg daily.    We will continue the tizanidine to help with sleep.    Time spent more than 25 minutes face-to-face reviewing above history and coordinating treatment plan as above          This note has been dictated using voice recognition software. Any grammatical or context distortions are unintentional and inherent to the software  "

## 2021-05-30 NOTE — PROGRESS NOTES
Pt is being seen today by Emeterio Ramirez MD, for refill and f/u of 4-constant, burning bilat knee and lt foot pain.   F6

## 2021-05-31 VITALS — BODY MASS INDEX: 28.25 KG/M2 | HEIGHT: 67 IN | WEIGHT: 180 LBS

## 2021-05-31 NOTE — ANESTHESIA PROCEDURE NOTES
Spinal Block    Patient location during procedure: OR  Start time: 8/21/2019 9:37 AM  End time: 8/21/2019 9:42 AM  Reason for block: primary anesthetic    Staffing:  Performing  Anesthesiologist: Gabriel Kim MD    Preanesthetic Checklist  Completed: patient identified, risks, benefits, and alternatives discussed, timeout performed, consent obtained, airway assessed, oxygen available, suction available, emergency drugs available and hand hygiene performed  Spinal Block  Patient position: sitting  Prep: ChloraPrep and site prepped and draped  Patient monitoring: heart rate, cardiac monitor, continuous pulse ox and blood pressure  Approach: midline  Location: L3-4  Injection technique: single-shot  Needle type: pencil-tip   Needle gauge: 24 G

## 2021-05-31 NOTE — ANESTHESIA PROCEDURE NOTES
Peripheral Block    Patient location during procedure: pre-op  Start time: 8/21/2019 9:00 AM  End time: 8/21/2019 9:05 AM  post-op analgesia per surgeon order as noted in medical record  Staffing:  Performing  Anesthesiologist: Gabriel Kim MD  Preanesthetic Checklist  Completed: patient identified, site marked, risks, benefits, and alternatives discussed, timeout performed, consent obtained, airway assessed, oxygen available, suction available, emergency drugs available and hand hygiene performed  Peripheral Block  Block type: saphenous, adductor canal block  Prep: ChloraPrep  Patient position: supine  Patient monitoring: cardiac monitor, continuous pulse oximetry, heart rate and blood pressure  Laterality: right  Injection technique: ultrasound guided    Ultrasound used to visualize needle placement in proximity to nerve being blocked: yes   Permanent ultrasound image captured for medical record  Sterile gel and probe cover used for ultrasound.    Needle  Needle type: Stimuplex   Needle gauge: 20G  Needle length: 6 in    Assessment  Injection assessment: negative aspiration for heme, no paresthesia on injection, incremental injection and no difficulty with injection

## 2021-05-31 NOTE — ANESTHESIA CARE TRANSFER NOTE
Last vitals:   Vitals:    08/21/19 1053   BP: 95/54   Pulse: 79   Resp: 14   Temp: 36.3  C (97.4  F)   SpO2: 95%     Patient's level of consciousness is awake and drowsy  Spontaneous respirations: yes  Maintains airway independently: yes  Dentition unchanged: yes  Oropharynx: oropharynx clear of all foreign objects    QCDR Measures:  ASA# 20 - Surgical Safety Checklist: WHO surgical safety checklist completed prior to induction    PQRS# 430 - Adult PONV Prevention: 4558F - Pt received => 2 anti-emetic agents (different classes) preop & intraop  ASA# 8 - Peds PONV Prevention: NA - Not pediatric patient, not GA or 2 or more risk factors NOT present  PQRS# 424 - Vania-op Temp Management: 4559F - At least one body temp DOCUMENTED => 35.5C or 95.9F within required timeframe  PQRS# 426 - PACU Transfer Protocol: - Transfer of care checklist used  ASA# 14 - Acute Post-op Pain: ASA14B - Patient did NOT experience pain >= 7 out of 10

## 2021-05-31 NOTE — TELEPHONE ENCOUNTER
Prior Authorization Request  Who s requesting:  Emeterio Ramirez MD/ Jennifer Brewer CMA  Pharmacy Name and Location: [facility]  Medication Name: Duloxetine 60mg  Insurance Plan: Medicaid  Insurance Member ID Number:  [id number]  Informed patient that prior authorizations can take up to 10 business days for response:   Yes  Okay to leave a detailed message: Yes        Per Pharmacy they no longer carry the NDC that was already approved

## 2021-05-31 NOTE — TELEPHONE ENCOUNTER
I spoke with patient this afternoon. She said only got #54 Tizanidine last refill.     I called the pharmacy , was on hold 13 minutes. Unable to hold any longer. Will   reorder. Patient instructed to have the pharmacy call us if a problem filling the #180

## 2021-05-31 NOTE — TELEPHONE ENCOUNTER
Called this am, complaining of last Tizanidine RX. Was seen 2 weeks ago, and was to get increased, but claims instead got 1/2 the amount.    Per last OV,   Tizanadine 4 mg three tabs bedtime, repeat after four hours    Patient got #180 tabs.     Tried to reach this am. Got reyes LUNAb

## 2021-05-31 NOTE — ANESTHESIA POSTPROCEDURE EVALUATION
Patient: Jaime L Franklin Behrends  RIGHT TOTAL KNEE ARTHROPLASTY  Anesthesia type: spinal    Patient location: PACU  Last vitals:   Vitals Value Taken Time   /70 8/21/2019 12:35 PM   Temp 36.7  C (98  F) 8/21/2019 12:35 PM   Pulse 70 8/21/2019 12:35 PM   Resp 12 8/21/2019 12:35 PM   SpO2 93 % 8/21/2019 12:35 PM     Post vital signs: stable  Level of consciousness: awake and responds to simple questions  Post-anesthesia pain: pain controlled  Post-anesthesia nausea and vomiting: no  Pulmonary: unassisted, nasal cannula  Cardiovascular: stable and blood pressure at baseline  Hydration: adequate  Anesthetic events: no    QCDR Measures:  ASA# 11 - Vania-op Cardiac Arrest: ASA11B - Patient did NOT experience unanticipated cardiac arrest  ASA# 12 - Vania-op Mortality Rate: ASA12B - Patient did NOT die  ASA# 13 - PACU Re-Intubation Rate: ASA13B - Patient did NOT require a new airway mgmt  ASA# 10 - Composite Anes Safety: ASA10A - No serious adverse event    Additional Notes:

## 2021-05-31 NOTE — ANESTHESIA PREPROCEDURE EVALUATION
Anesthesia Evaluation      Patient summary reviewed   No history of anesthetic complications     Airway   Mallampati: II  Neck ROM: full   Pulmonary - negative ROS and normal exam    breath sounds clear to auscultation  (+) a smoker                         Cardiovascular - normal exam  (+) hypertension, ,     ECG reviewed  Rhythm: regular  Rate: normal,         Neuro/Psych - negative ROS   (+) depression, anxiety/panic attacks, chronic pain (takes 150 mg methadone/day (took this am), current VAS 2-3/10)    Endo/Other - negative ROS   (+) arthritis,      GI/Hepatic/Renal    (+) GERD,   chronic renal disease,           Dental - normal exam                          Anesthesia Plan  Planned anesthetic: peripheral nerve block and spinal    ASA 3     Anesthetic plan and risks discussed with: patient  Anesthesia plan special considerations: antiemetics,   Post-op plan: routine recovery

## 2021-06-01 ENCOUNTER — COMMUNICATION - HEALTHEAST (OUTPATIENT)
Dept: PALLIATIVE MEDICINE | Facility: OTHER | Age: 56
End: 2021-06-01

## 2021-06-01 VITALS — WEIGHT: 208 LBS | BODY MASS INDEX: 31.52 KG/M2 | HEIGHT: 68 IN

## 2021-06-01 VITALS — HEIGHT: 68 IN | WEIGHT: 208 LBS | BODY MASS INDEX: 31.52 KG/M2

## 2021-06-01 VITALS — WEIGHT: 208 LBS | HEIGHT: 68 IN | BODY MASS INDEX: 31.52 KG/M2

## 2021-06-01 VITALS — BODY MASS INDEX: 32.33 KG/M2 | WEIGHT: 206 LBS | HEIGHT: 67 IN

## 2021-06-01 VITALS — WEIGHT: 206 LBS | HEIGHT: 67 IN | BODY MASS INDEX: 32.33 KG/M2

## 2021-06-01 VITALS — WEIGHT: 206 LBS | BODY MASS INDEX: 32.33 KG/M2 | HEIGHT: 67 IN

## 2021-06-01 VITALS — HEIGHT: 68 IN | BODY MASS INDEX: 31.52 KG/M2 | WEIGHT: 208 LBS

## 2021-06-01 VITALS — BODY MASS INDEX: 32.33 KG/M2 | HEIGHT: 67 IN | WEIGHT: 206 LBS

## 2021-06-01 VITALS — HEIGHT: 67 IN | WEIGHT: 206 LBS | BODY MASS INDEX: 32.33 KG/M2

## 2021-06-01 DIAGNOSIS — G89.29 CHRONIC PAIN OF LEFT ANKLE: ICD-10-CM

## 2021-06-01 DIAGNOSIS — M25.572 CHRONIC PAIN OF LEFT ANKLE: ICD-10-CM

## 2021-06-01 NOTE — TELEPHONE ENCOUNTER
Spoke with Dr Ramirez. Reviewed information about Cymbalta use. Will continue Cymbalta 60 mg 3 times a day.    Cued refill. Should patient start lower, since out 5 days??

## 2021-06-01 NOTE — TELEPHONE ENCOUNTER
Patient calling for refill of Duloxatine. Upset, because continues to get wrong rx sent in. patient states take 3 a day, but last rx was for one a day. In review of last OV, noted discontinued Duloxatine. Will need to review with Dr Ramirez

## 2021-06-01 NOTE — TELEPHONE ENCOUNTER
patient calling today about getting refill of Cymbalta. Has been taking 3 tabs a day, and now been out for 5 days. I called patient and reviewed her Cymbalta is a 60 mg tab, and should only take one a day.     Patient was not aware of change, and has been taking 3 tabs and doing great. Has been taking 3 tabs a day for months and is sure is 60 mg. Admits her bottle does say to take one a day, but had not looked at it until now.    I called the Pharmacy and reviewed the past meds on EMR. She has been getting 60 mg since 11/2018. Will review with Dr Ramirez.

## 2021-06-01 NOTE — PATIENT INSTRUCTIONS - HE
PLAN:    Continue Lamictal, Wellbutrin, Gabapentin, Tizanidine as you are    Discussed enrollment in the [place] Medical Cannabis program    You will discuss with your therapist if there is access to trauma therapy in the program    Return in 3-4 months

## 2021-06-01 NOTE — PROGRESS NOTES
Assessment/Plan:     Problem List Items Addressed This Visit     S/P ankle ligament repair    Relevant Medications    buPROPion (WELLBUTRIN XL) 300 MG 24 hr tablet    Depression, unspecified depression type    Relevant Medications    buPROPion (WELLBUTRIN XL) 300 MG 24 hr tablet    Chronic ankle pain    Relevant Medications    buPROPion (WELLBUTRIN XL) 300 MG 24 hr tablet    Postoperative pain    Relevant Medications    buPROPion (WELLBUTRIN XL) 300 MG 24 hr tablet    Chronic, continuous use of opioids    Relevant Medications    lamoTRIgine (LAMICTAL) 25 MG tablet    buPROPion (WELLBUTRIN XL) 300 MG 24 hr tablet      Other Visit Diagnoses     Chronic pain syndrome        Relevant Medications    tiZANidine (ZANAFLEX) 4 MG tablet    gabapentin (NEURONTIN) 300 MG capsule    Status post ankle fusion        Relevant Medications    buPROPion (WELLBUTRIN XL) 300 MG 24 hr tablet    Status post hardware removal        Relevant Medications    buPROPion (WELLBUTRIN XL) 300 MG 24 hr tablet    S/P bone graft        Relevant Medications    buPROPion (WELLBUTRIN XL) 300 MG 24 hr tablet              No follow-ups on file.    Patient Instructions   PLAN:    Continue Lamictal, Wellbutrin, Gabapentin, Tizanidine as you are    Discussed enrollment in the [place] Medical Cannabis program    You will discuss with your therapist if there is access to trauma therapy in the program    Return in 3-4 months        Subjective:       54 y.o. female presents for evaluation of anxiety and agoraphobia.    She reviews having her right total knee replacement last month.  She was told and found it was more difficult than left and that there was more damage.  She is involved in physical therapy.  She still uses a crutch for support.  She is no longer using the hydromorphone.    She has not yet heard from the medical cannabis program interested in pursuing that.    She is using the tizanidine 3 tablets at bedtime repeating after 4 hours and uses that for  "sleep.  Continues on the lamotrigine now 100 mg daily with Wellbutrin.    I inquired about anxiety, she became tearful, noting this was a problem initially after her surgery.  It is resolved and she is doing better now is fine coming here today.    We discussed for some patients after having a surgery and being vulnerable can activate some themes of anxiety.  She acknowledged being dependent upon others was a challenge.  She does not have any past history of trauma related to being in this experience.  She notes however this the first time that they have occurred that she is been that vulnerable people.  We discussed pursuing this more aggressive in psychotherapy.  She does have a counselor she sees weekly in the methadone program.  She is aware they are bringing in mental health services and can look into that.    She continues on the Wellbutrin and gabapentin finds her also benefit.    She brightens she describes her  is now in residential treatment.  She is \"proud\" of how is doing.        Current Outpatient Medications:      acetaminophen (TYLENOL) 500 MG tablet, Take 2 tablets (1,000 mg total) by mouth 3 (three) times a day., Disp: , Rfl: 0     amLODIPine (NORVASC) 10 MG tablet, Take 10 mg by mouth daily., Disp: , Rfl:      aspirin 325 MG tablet, Take 1 tablet (325 mg total) by mouth daily., Disp: 42 tablet, Rfl: 0     buPROPion (WELLBUTRIN XL) 300 MG 24 hr tablet, Take 1 tablet (300 mg total) by mouth daily., Disp: 30 tablet, Rfl: 0     DULoxetine (CYMBALTA) 60 MG capsule, Take 1 capsule (60 mg total) by mouth daily., Disp: 30 capsule, Rfl: 2     ergocalciferol (ERGOCALCIFEROL) 50,000 unit capsule, Take 1 capsule (50,000 Units total) by mouth every 7 days., Disp: 4 capsule, Rfl: 0     gabapentin (NEURONTIN) 300 MG capsule, Take 2 capsules (600 mg total) by mouth 2 (two) times a day., Disp: 120 capsule, Rfl: 0     lamoTRIgine (LAMICTAL) 25 MG tablet, Take 2 tablets (50 mg total) by mouth 2 (two) times a " "day. One daily 2 weeks, 2 daily 2 weeks, 2 morning one bedtime 2 weeks, 2 twice a day, Disp: 120 tablet, Rfl: 2     methadone (DOLOPHINE) 10 mg/mL solution, Take 150 mg by mouth daily. Goes to pain clinic 0630 am everyday    , Disp: , Rfl:      polyethylene glycol (MIRALAX) 17 gram packet, Take 1 packet (17 g total) by mouth daily., Disp: , Rfl: 0     senna-docusate (PERICOLACE) 8.6-50 mg tablet, Take 1 tablet by mouth 2 (two) times a day., Disp: , Rfl: 0     tiZANidine (ZANAFLEX) 4 MG tablet, Three tabs bedtime and may repeat after four hours, Disp: 180 tablet, Rfl: 2     varenicline (CHANTIX) 1 mg tablet, Take 1 mg by mouth 2 (two) times a day with meals.    , Disp: , Rfl:   Alert with a clear sensorium good eye contact.  Ambulates with one cane.  Initially bright affect, as noted briefly tearful talking about experience with anxiety and agoraphobia after knee surgery, then brightens again         Objective:     Vitals:    09/17/19 0852   BP: (!) 152/109   Pulse: 90   Resp: 16   Weight: 180 lb (81.6 kg)   Height: 5' 7\" (1.702 m)   PainSc:   4   PainLoc: Knee     Land: We will clarify her status of enrollment the medical cannabis program as she would like to pursue that for energy and pain and anxiety.    We will continue with psychotropics as above.    She will clarify with her subs abuse counselor if there is access to mental health counselors including those that do trauma work.     Time spent more than 10 minutes face-to-face, 50% count about above condition coordination treatment plan as above          This note has been dictated using voice recognition software. Any grammatical or context distortions are unintentional and inherent to the software  "

## 2021-06-02 VITALS — HEIGHT: 67 IN | WEIGHT: 206 LBS | BODY MASS INDEX: 32.33 KG/M2

## 2021-06-02 VITALS — HEIGHT: 67 IN | BODY MASS INDEX: 29.19 KG/M2 | WEIGHT: 186 LBS

## 2021-06-02 VITALS — HEIGHT: 67 IN | WEIGHT: 186 LBS | BODY MASS INDEX: 29.19 KG/M2

## 2021-06-02 VITALS — BODY MASS INDEX: 32.33 KG/M2 | WEIGHT: 206 LBS | HEIGHT: 67 IN

## 2021-06-02 VITALS — BODY MASS INDEX: 29.19 KG/M2 | HEIGHT: 67 IN | WEIGHT: 186 LBS

## 2021-06-02 VITALS — WEIGHT: 186 LBS | HEIGHT: 67 IN | BODY MASS INDEX: 29.19 KG/M2

## 2021-06-03 VITALS — WEIGHT: 183.5 LBS | BODY MASS INDEX: 28.8 KG/M2 | HEIGHT: 67 IN

## 2021-06-03 VITALS — BODY MASS INDEX: 28.25 KG/M2 | HEIGHT: 67 IN | WEIGHT: 180 LBS

## 2021-06-03 VITALS — WEIGHT: 187 LBS | HEIGHT: 67 IN | BODY MASS INDEX: 29.35 KG/M2

## 2021-06-03 VITALS — BODY MASS INDEX: 28.8 KG/M2 | WEIGHT: 183.5 LBS | HEIGHT: 67 IN

## 2021-06-03 VITALS — BODY MASS INDEX: 28.38 KG/M2 | HEIGHT: 67 IN | WEIGHT: 180.8 LBS

## 2021-06-03 NOTE — TELEPHONE ENCOUNTER
Refill request: lamotrigine 25 mg and chantix.  Last ov:9/17 with BE  Next ov:12/16 with BE  Unclear if chantix is to be continued at this time.  Requested Prescriptions     Pending Prescriptions Disp Refills     lamoTRIgine (LAMICTAL) 25 MG tablet 120 tablet 2     Sig: Take 2 tablets (50 mg total) by mouth 2 (two) times a day.     [facility]

## 2021-06-04 VITALS
HEIGHT: 66 IN | BODY MASS INDEX: 29.83 KG/M2 | BODY MASS INDEX: 31.67 KG/M2 | BODY MASS INDEX: 29.83 KG/M2 | BODY MASS INDEX: 31.67 KG/M2 | HEIGHT: 66 IN | WEIGHT: 196.21 LBS | WEIGHT: 196.21 LBS

## 2021-06-04 VITALS — BODY MASS INDEX: 29.92 KG/M2 | WEIGHT: 197.4 LBS | HEIGHT: 68 IN

## 2021-06-04 VITALS — BODY MASS INDEX: 31.67 KG/M2 | HEIGHT: 66 IN

## 2021-06-04 NOTE — TELEPHONE ENCOUNTER
LMTCB    Tizanidine should not need to be refilled until 12/16/19 if patient was taking three tabs at bedtime and again in four hours.  RN was going to ask how she had been taking it before cueing up the medication for provider.    Lauren Xie, RN

## 2021-06-04 NOTE — TELEPHONE ENCOUNTER
Patient returned call and states that she takes 4 tabs at bedtime and then another 4 tabs in about 4 hours every night due to waking up. Patient states she has two more days left.    Medication being requested: Tizanidine  Last visit date: 9/17/19 Provider: MANUEL  Next visit date: 12/16/19 Provider: BE  Expected follow up: 3-4 months  MTM visit (Pain Center) date: No  Pertinent between visit information about requested medication (telephone, mychart, prior authorization, concerns): Patient is taking 8 tabs/night instead of 6  Last date prescribed: 9/17/19  Provider responsible: BE  Spoke with patient: yes  Script being sent to provider - dates and quantity:   Requested Prescriptions     Pending Prescriptions Disp Refills     tiZANidine (ZANAFLEX) 4 MG tablet 180 tablet 0     Sig: Three tabs bedtime and may repeat after four hours     Pharmacy cued: [facility]

## 2021-06-05 VITALS — BODY MASS INDEX: 26.52 KG/M2 | HEIGHT: 66 IN | WEIGHT: 165 LBS

## 2021-06-05 VITALS — BODY MASS INDEX: 26.63 KG/M2 | HEIGHT: 66 IN

## 2021-06-05 NOTE — TELEPHONE ENCOUNTER
Appears that patient is scheduled for 1/31 with BE.  Duloxetine remains in cue as patient had not been in to the clinic since 9/17 12/16-NO SHOW with BE.  Please advise or sign if appropriate now that patient is scheduled.

## 2021-06-05 NOTE — PATIENT INSTRUCTIONS - HE
PLAN:    See if [facility] has therapists that deal with trauma, if not contact [facility] [phone]    Lamictal 100 mg one daily morning and one-half bedtime, after two weeks if needed may increase to one twice a day    Return in 2-3 months

## 2021-06-05 NOTE — PROGRESS NOTES
Patient presents to the clinic today for a follow up with Emeterio Ramirez MD regarding patient has left ankle pain. Patient describes her pain as constant, sharp and throbbing. Needs refill on senna docusate and Gabapentin. Functionality score is 5.

## 2021-06-05 NOTE — TELEPHONE ENCOUNTER
Calling for refill of Gabapentin and Duloxetine.  Is out of med's. Call back at [phone] if questions.    Last visit date: 9/17 Provider: pramod  No show 9/16  Next visit date: none Provider: pramod  Expected follow up: 3 months  MTM visit (Pain Center) date: no  Pertinent between visit information about requested medication (telephone, mychart, prior authorization, concerns): non compliant with f/u   needs a f/u scheduled.

## 2021-06-05 NOTE — TELEPHONE ENCOUNTER
Prior Authorization Request  Who s requesting:  Pharmacy/Ramirze  Pharmacy Name and Location: [facility]  Medication Name: duloxetine 60mg, 3/day  Insurance [place]  Insurance Member ID Number:    CoverMyMeds Key: [id number]  Informed patient that prior authorizations can take up to 10 business days for response:   Yes  Okay to leave a detailed message: No

## 2021-06-05 NOTE — TELEPHONE ENCOUNTER
Central PA team  [phone]  Pool: HE PA MED ([id number])          PA has been initiated.       PA form completed and faxed insurance via Cover My Meds     Key:  [id number]     Medication:  DULOXETINE 60MG    Insurance:  [employer]        Response will be received via fax and may take up to 5-10 business days depending on plan

## 2021-06-05 NOTE — PROGRESS NOTES
"Assessment/Plan:     Problem List Items Addressed This Visit     Depression, unspecified depression type    Relevant Medications    lamoTRIgine (LAMICTAL) 100 MG tablet    Arthritis of right knee    Relevant Medications    senna-docusate (PERICOLACE) 8.6-50 mg tablet      Other Visit Diagnoses     Chronic pain syndrome    -  Primary              No follow-ups on file.    Patient Instructions   PLAN:    See if [facility] has therapists that deal with trauma, if not contact [facility] [phone]    Lamictal 100 mg one daily morning and one-half bedtime, after two weeks if needed may increase to one twice a day    Return in 2-3 months        Subjective:       54 y.o. female presents for management of knee and ankle pain.  Pain varies from a 2 to a 7.    She reports she is doing fairly well.  She is quite pleased that her  has been in treatment and sober for 6 months.  Their daughter has a 4-month-old baby living with that is been fairly well.    She anticipates next month to be her \"ninth and final\" surgery on her left ankle.  Now that she has had her left knee repaired should be able to address this.  She is going through [facility] orthopedics.    She continues to be working on her medical cannabis paperwork and.    She describes [place] phobia seems to be creeping back having a couple of panic attacks and could not leave the home.  She is not clear what triggered those.    When last seen she had discussed she has been going in the methadone program, sees a therapist there, and was to look into whether they had any therapist that address PTSD symptoms.  Notes that she is not sleeping as well and we reviewed the PTSD arousal may be just under the surface.    She has been taking lamotrigine 100 mg daily and the duloxetine 180 mg daily.      Current Outpatient Medications:      amLODIPine (NORVASC) 10 MG tablet, Take 10 mg by mouth daily., Disp: , Rfl:      aspirin 325 MG tablet, Take 1 tablet (325 mg " "total) by mouth daily. (Patient taking differently: Take 325 mg by mouth daily as needed. 1 tab every 6 hours, as needed), Disp: 42 tablet, Rfl: 0     buPROPion (WELLBUTRIN XL) 300 MG 24 hr tablet, Take 1 tablet (300 mg total) by mouth daily., Disp: 30 tablet, Rfl: 0     DULoxetine (CYMBALTA) 60 MG capsule, Take 1 capsule (60 mg total) by mouth 3 (three) times a day., Disp: 90 capsule, Rfl: 0     ergocalciferol (ERGOCALCIFEROL) 50,000 unit capsule, Take 1 capsule (50,000 Units total) by mouth every 7 days., Disp: 4 capsule, Rfl: 0     methadone (DOLOPHINE) 10 mg/mL solution, Take 150 mg by mouth daily. Goes to pain clinic 0630 am everyday    , Disp: , Rfl:      tiZANidine (ZANAFLEX) 4 MG tablet, four tabs bedtime and may repeat after four hours, Disp: 240 tablet, Rfl: 3     varenicline (CHANTIX) 1 mg tablet, Take 1 mg by mouth 2 (two) times a day with meals.    , Disp: , Rfl:      lamoTRIgine (LAMICTAL) 100 MG tablet, One tab morning and one-half bedtime for two weeks, if needed one twice a day, Disp: 60 tablet, Rfl: 1     polyethylene glycol (MIRALAX) 17 gram packet, Take 1 packet (17 g total) by mouth daily., Disp: , Rfl: 0     senna-docusate (PERICOLACE) 8.6-50 mg tablet, Take 1 tablet by mouth 2 (two) times a day., Disp: 30 tablet, Rfl: 4  Alert with a clear sensorium good eye contact.  Thought process tight logical.  Affect is full range.     is reviewed, continues on gabapentin 300 mg 4 daily.         Objective:     Vitals:    01/31/20 1123   BP: 126/76   Pulse: 80   Weight: 197 lb 6.4 oz (89.5 kg)   Height: 5' 8\" (1.727 m)   PainSc:   5   PainLoc: Ankle       Plan: Discussed the importance of addressing PTSD elements with therapist and resources were reviewed.    We will increase the lamotrigine to see if that is helpful as well for anxiety and changing the threshold for panic and agoraphobia.    Time spent more than 15 minutes face-to-face, 50% counts about above condition coordination treatment " plan            This note has been dictated using voice recognition software. Any grammatical or context distortions are unintentional and inherent to the software

## 2021-06-07 ENCOUNTER — COMMUNICATION - HEALTHEAST (OUTPATIENT)
Dept: PALLIATIVE MEDICINE | Facility: OTHER | Age: 56
End: 2021-06-07

## 2021-06-07 NOTE — TELEPHONE ENCOUNTER
Medication being requested: duloxetine 60 mg  Last visit date:1/31  Provider: MANUEL  Next visit date: 4/28 Provider: MANUEL  Expected follow up: 8 weeks  MTM visit (Pain Center) date: no  Pertinent between visit information about requested medication (telephone, mychart, prior authorization, concerns): multiple refill requests  Last date prescribed: 1/16  Provider responsible: BE  Spoke with patient: no  Script being sent to provider - dates and quantity:   Requested Prescriptions     Pending Prescriptions Disp Refills     DULoxetine (CYMBALTA) 60 MG capsule 90 capsule 0     Sig: Take 1 capsule (60 mg total) by mouth 3 (three) times a day.     Pharmacy cued: [facility]      Does not appear that patient is taking as prescribed or would have been out of medication several weeks ago?

## 2021-06-07 NOTE — TELEPHONE ENCOUNTER
Medication being requested: lamotrigine  Last visit date: 1/31/2020 Provider: BE  Next visit date: 4/28/2020 Provider: MANUEL  Expected follow up: 2-3 months  MTM visit (Pain Center) date: defer to RN  Pertinent between visit information about requested medication (telephone, mychart, prior authorization, concerns): defer to RN  Last date prescribed: 1/31/2020  Provider responsible: BE  Spoke with patient: no  Script being sent to provider - dates and quantity:   Requested Prescriptions     Pending Prescriptions Disp Refills     lamoTRIgine (LAMICTAL) 100 MG tablet 60 tablet 1     Sig: One  Tablet orally twice a day     60 tablets for 30 days    Pharmacy cued: [facility]

## 2021-06-07 NOTE — TELEPHONE ENCOUNTER
Medication being requested: Tizanidine  Last visit date: 1/31/2020 Provider: BE  Next visit date: 4/28/2020 Provider: BE  Expected follow up: 2-3 months  MTM visit (Pain Center) date: defer to RN  Pertinent between visit information about requested medication (telephone, mychart, prior authorization, concerns): defer to RN  Last date prescribed: 12/4/2019  Provider responsible: BE  Spoke with patient: no  Script being sent to provider - dates and quantity:   Requested Prescriptions     Pending Prescriptions Disp Refills     tiZANidine (ZANAFLEX) 4 MG tablet 240 tablet 3     Sig: four tabs bedtime and may repeat after four hours   3/236/2020 to 4/28/2020, 240 tablets for 30 days  Pharmacy cued: [facility]

## 2021-06-07 NOTE — TELEPHONE ENCOUNTER
Scripts - reviewed  MTM visit: No  Pertinent between visit information about requested medication (telephone, mychart, prior authorization, concerns, comments): No  Standing orders for withdrawal protocol implemented: NA

## 2021-06-07 NOTE — TELEPHONE ENCOUNTER
Scripts - reviewed  MTM visit: na  Pertinent between visit information about requested medication (telephone, mychart, prior authorization, concerns, comments): na  Standing orders for withdrawal protocol implemented: vik

## 2021-06-07 NOTE — PROGRESS NOTES
Assessment/Plan:     Problem List Items Addressed This Visit     None      Visit Diagnoses     Chronic pain syndrome    -  Primary              No follow-ups on file.    There are no Patient Instructions on file for this visit.      Subjective:       54 y.o.   Pt scheduled for telephone.  Phone indicates not accepting calls at this time.  Staff called twice.             Objective:   There were no vitals filed for this visit.            This note has been dictated using voice recognition software. Any grammatical or context distortions are unintentional and inherent to the software

## 2021-06-08 NOTE — TELEPHONE ENCOUNTER
Central PA team  [phone]  Pool: [facility]I PA MED ([id number])          PA has been initiated.       PA form completed and faxed insurance via Cover My Meds     Key:  CoverMyMeds Key: [id number]     Medication:  DULOXETINE 60 MG    Insurance:  [place] MEDICAID        Response will be received via fax and may take up to 5-10 business days depending on plan

## 2021-06-08 NOTE — TELEPHONE ENCOUNTER
Medication being requested: Cymbalta  Last visit date: 1/31/20 Provider: MANUEL  Next visit date: 6/29/20 Provider: MANUEL  Expected follow up: 2-3 months  MTM visit (Pain Center) date: Not done since last visit   Pertinent between visit information about requested medication (telephone, mychart, prior authorization, concerns):     5/20/20 - Pre admit for General surgery     4/28/20 - pt had a telephone appointment but the patient was not available.     Last date prescribed: 4/1/20  Provider responsible: BE  Spoke with patient: No  Script being sent to provider - dates and quantity:   Requested Prescriptions     Pending Prescriptions Disp Refills     DULoxetine (CYMBALTA) 60 MG capsule 90 capsule 0     Sig: Take 1 capsule (60 mg total) by mouth 3 (three) times a day.     Pharmacy cued: [facility]

## 2021-06-08 NOTE — TELEPHONE ENCOUNTER
As it stands now, there have been six requests for PA sent to [place] Medicaid and six have come back as no action taken because both myself and the pharmacy keep getting that either there is a PA in place for the NDC that the pharmacy can order and it should not be . However, on multiple faxes, the date for the PA Number they have a date that would be .  The pharmacy has also reached out to the Pharmacy Help Desk and they are saying that yes it needs a PA because of the dose, but also mentioning that it could be a refill too soon as the patient filled the 60 mg at 1 capsule daily around 2020. The pharmacy was told that it would still need a PA due to the dose change. I was informed by Samia, whom stated that the Pharmacy Help Desk was stating incorrect information and  to have the pharmacy call them and ask for him personally.  I did reach out to the pharmacy and let them know. They will call the PA Call Center and call me back.

## 2021-06-08 NOTE — TELEPHONE ENCOUNTER
I CALLED [facility] AND SPOKE WITH [facility] CONSTANZA LIRA RELAYED AND DOUBLE CONFIRMED INFORMATION THAT PROVIDED TO ME THIS AM. SHE WILL CALL THE [facility] HELP DESK AND REACH OUT TO ME WITH A STATUS UPDATE.

## 2021-06-08 NOTE — TELEPHONE ENCOUNTER
Patient left a message requesting refill of Duloxetine (already taken care of 6/5/20) and Lamictal    Patient is outside of the 3 month window so forwarding information to provider for final decision.    Medication being requested: Lamictal  Last visit date: 1/31/20 Provider: MANUEL  Next visit date: 6/29/20 Provider: MANUEL  Expected follow up: 2-3 months  MTM visit (Pain Center) date: No  Pertinent between visit information about requested medication (telephone, mychart, prior authorization, concerns):   4/28/20 Televisit: Call was not answered, therefore visit was not completed  5/20/20 Pre-Admit Appointment  Last date prescribed: 3/19/20  Provider responsible: BE  Spoke with patient: Patient left a message on triage line  Script being sent to provider - dates and quantity:     Pharmacy cued:

## 2021-06-08 NOTE — TELEPHONE ENCOUNTER
Central PA team  [phone]  Pool: HE PA MED ([id number])          PA has been initiated. RESUBMITTING AS IT WAS PREVIOUSLY APPROVED, HOWEVER, THE PA NUMBER I WAS GIVEN TO BY [place] MEDICAID IS NOT PROCESSING. PER MEDICAID, THE PA WAS APPROVED FOR THE NDC THAT THE PHARMACY [place] [address]-40; THE CORRISPONDING PA NUMBER IS #[id number]. THE PHARMACY WILL CONTINUE TO WORK ON, BUT INCASE I DID RESUBMIT THE REQUEST.      PA form completed and faxed insurance via Cover My Meds     Key:  [id number]     Medication:  DULoxetine HCl 60MG dr capsules    Insurance:  [place] MEDICAID        Response will be received via fax and may take up to 5-10 business days depending on plan

## 2021-06-08 NOTE — TELEPHONE ENCOUNTER
RECEIEVED A CALL FROM [facility] IN REGARDS TO PA - NDC AND PA NUMBER STILL NOT PROCESSING PER PHARMACY.    I GAVE A CALL TO [place] [employer]ICA[employer] AND SPOKE WITH CHARLENE - VERIFIED THE PA NUMBER, NDC, QUANTITY, PHARMACY LOCATION AND PER REP, IT SHOULD BE PROCESSING. HE SUGGESTED THAT THE PHARMACY CALL TO VERIFY EVERYTHING ON THEIR END IS CORRECT WITH THE HELP DESK.    LILIAN APODACA IS THERE AT [facility] TODAY; I WILL CALL THEN TO CONFIRM AND THEN RELAY MESSAGE FROM CHARLENE AND GO FORWARD.

## 2021-06-08 NOTE — TELEPHONE ENCOUNTER
Central PA team  [phone]  Pool: [facility]I PA MED ([id number])          PA has been initiated.       PA form completed and faxed insurance via Cover My Meds     Key:  [id number]     Medication:  DULOXETINE 60 MG 1 CAPSULE T.I.D.    Insurance:  [place]ICA[place]        Response will be received via fax and may take up to 5-10 business days depending on plan

## 2021-06-08 NOTE — TELEPHONE ENCOUNTER
Calling the pharmacy resulted in that the KEY that was originally sent, even though it was requested for the 60 mg, the NDC matched up to the 20 mg dose. Preciously notes indicates that the patient has been 180 mg. Sending a note to the provider to confirm dose.

## 2021-06-09 NOTE — PATIENT INSTRUCTIONS - HE
PLAN:  Please have the GI doctors send a copy of their evaluation to Dr. Ramirez.    Continue with the duloxetine 60 mg 3 tablets daily.  Discussed cautions with running out.    Continue the lamotrigine Wellbutrin gabapentin and tizanidine as you are.    Follow-up with Dr. Ramirez in 4 to 6 months.

## 2021-06-09 NOTE — PROGRESS NOTES
"Assessment/Plan:     Problem List Items Addressed This Visit     None      Visit Diagnoses     Chronic pain syndrome        Relevant Medications    tiZANidine (ZANAFLEX) 4 MG tablet    gabapentin (NEURONTIN) 600 MG tablet            No follow-ups on file.    There are no Patient Instructions on file for this visit.      Subjective:       55 y.o. female The patient has been notified of the following:      \"We have found that certain health care needs can be provided without the need for a face to face visit.  This service lets us provide the care you need with a phone conversation.       I will have full access to your [facility] medical record during this entire phone call.   I will be taking notes for your medical record.      Since this is like an office visit, we will bill your insurance company for this service.       There are potential benefits and risks of telephone visits (e.g. limits to patient confidentiality) that differ from in-person visits.?  Confidentiality still applies for telephone services, and nobody will record the visit.  It is important to be in a quiet, private space that is free of distractions (including cell phone or other devices) during the visit.??      If during the course of the call I believe a telephone visit is not appropriate, you will not be charged for this service\"     Consent has been obtained for this service by care team member: Yes         Kain reviews doing badly, more sad and angry.  This seems to correlate with being off the duloxetine for 3 weeks.  They were prior authorization issues.  She was taking 60 mg 3 times a day.  This dose has been helpful for her.  Been back on for 3 days.  We reviewed a discontinuation syndrome, she was not familiar with this though that correlates and she was relieved to know her symptoms and made sense.    She reviews plans to be selling her house and downsizing.    She continues going to the "[employer] and that is doing well.  She notes " her  is sober which is important.    She continues on the lamotrigine 150 mg daily, Wellbutrin 300 mg daily.  On this regimen her agoraphobia had been feeling better until recent discontinuation of the duloxetine.    Continues on the gabapentin 600 mg twice a day which helps her foot pain.    Uses tizanidine 4 mg at bedtime for sleep.    She has not yet looked into a therapist noting that it might be helpful, but was concerned the amount of time it will take to get established and she wants to focus on getting moved into a new home.    She was to have another ankle surgery though that is been postponed as it was not emergent and she is okay with doing that again with the moving.    Continues with medical cannabis which seemed to help the nausea.  She reports she has been having throwing up almost daily over the last few years.  Her weight has gone down a little.  She is scheduled for an endoscopy soon.  She does not correlate that any of her medications.     is reviewed, reflects the gabapentin.      Current Outpatient Medications:      acetaminophen (TYLENOL) 500 MG tablet, Take 1,000 mg by mouth every 8 (eight) hours as needed., Disp: , Rfl:      amLODIPine (NORVASC) 10 MG tablet, Take 10 mg by mouth daily., Disp: , Rfl:      aspirin 325 MG tablet, Take 1 tablet (325 mg total) by mouth daily. (Patient taking differently: Take 325 mg by mouth daily as needed. 1 tab every 6 hours, as needed), Disp: 42 tablet, Rfl: 0     buPROPion (WELLBUTRIN XL) 300 MG 24 hr tablet, Take 1 tablet (300 mg total) by mouth daily., Disp: 30 tablet, Rfl: 0     cyanocobalamin 1000 MCG tablet, Take 1,000 mcg by mouth daily., Disp: , Rfl:      DULoxetine (CYMBALTA) 60 MG capsule, Take 1 capsule (60 mg total) by mouth 3 (three) times a day., Disp: 90 capsule, Rfl: 0     ergocalciferol (ERGOCALCIFEROL) 50,000 unit capsule, Take 1 capsule (50,000 Units total) by mouth every 7 days., Disp: 4 capsule, Rfl: 0     lamoTRIgine (LAMICTAL)  "100 MG tablet, One tablet morning, one-half bedtime, Disp: 45 tablet, Rfl: 1     medical cannabis (Patient's own supply), 1 Dose by Other route see administration instructions. (The purpose of this order is to document that the patient reports taking medical cannabis. This is not a prescription, and is not used to certify that the patient has a  qualifying medical condition.), Disp: , Rfl:      methadone (DOLOPHINE) 10 mg/mL solution, Take 150 mg by mouth daily. Goes to pain clinic 0630 am everyday    , Disp: , Rfl:      omeprazole (PRILOSEC) 20 MG capsule, Take 20 mg by mouth daily before breakfast., Disp: , Rfl:      polyethylene glycol (MIRALAX) 17 gram packet, Take 1 packet (17 g total) by mouth daily., Disp: , Rfl: 0     prochlorperazine (COMPAZINE) 5 MG tablet, Take 1 tablet by mouth every 8 (eight) hours as needed., Disp: , Rfl:      senna-docusate (PERICOLACE) 8.6-50 mg tablet, Take 1 tablet by mouth 2 (two) times a day., Disp: 30 tablet, Rfl: 4     tiZANidine (ZANAFLEX) 4 MG tablet, four tabs bedtime and may repeat after four hours, Disp: 240 tablet, Rfl: 3     gabapentin (NEURONTIN) 600 MG tablet, Take 1 tablet (600 mg total) by mouth 2 (two) times a day., Disp: 56 tablet, Rfl: 3     varenicline (CHANTIX) 1 mg tablet, Take 1 mg by mouth 2 (two) times a day with meals.    , Disp: , Rfl:      By telephone sounds alert, clear sensorium, thought process tight and logical    Impression: Chronic pain includes history of lower extremity surgeries.    Comorbid substance abuse in the home program.  Comorbid [place] phobia had been improving on above medications.    Plan: Reviewed discontinuation syndrome and the importance of avoiding this for the duloxetine.  She will continue with the other psychotropic agents as above.    She will asked the GI doctors to forward information to the clinic.    Follow-up in 4 to 6 months.         Objective:     Vitals:    06/29/20 0842   Height: 5' 6\" (1.676 m) "                   This note has been dictated using voice recognition software. Any grammatical or context distortions are unintentional and inherent to the software

## 2021-06-09 NOTE — TELEPHONE ENCOUNTER
Medication being requested: duloxetine 60 mg  Last visit date: 6/29 Provider: BE  Next visit date: 10/28 Provider: MANUEL  Expected follow up: 8 weeks  MTM visit (Pain Center) date: no  Pertinent between visit information about requested medication (telephone, mychart, prior authorization, concerns): no  Last date prescribed: 6/5  Provider responsible: BE  Spoke with patient: no  Script being sent to provider - dates and quantity:   Requested Prescriptions     Pending Prescriptions Disp Refills     DULoxetine (CYMBALTA) 60 MG capsule 90 capsule 0     Sig: Take 1 capsule (60 mg total) by mouth 3 (three) times a day.     Pharmacy cued: [facility]

## 2021-06-09 NOTE — TELEPHONE ENCOUNTER
Called [place] Medicaid Call Center to check the status and spoke with Wili and he states that the attempted twice to do start the PA, but they were not able to and they are looking into trying again and hopefully we should get a response later today. He did say if we don't hear from them within a reasonable time frame to give them a call back before end of business today.

## 2021-06-09 NOTE — TELEPHONE ENCOUNTER
"Spoke with patient today, she is struggling without her medication and this is not something she should just stop suddenly, she is now without this x 3 weeks, she is experiencing side effects of mood instability, \"everythins is making me feel sad and cry\"  Reviewed that this is being reviewed by the PA team today and that if no response they will contact [employer] before the end of today, if this is not resolved patient may need a substitute.  Sending to PA team as well as the provider  "

## 2021-06-09 NOTE — TELEPHONE ENCOUNTER
2020 Prior Authorization Request  Who's requesting PA: Pharmacy  Provider: Emeterio Ramirez MD  Pharmacy Name, Location & Phone # : [id number] - [place], [place] - [address] [place] [address] [phone], F: [phone]  Medication Name: Duloxetine HCI 60 MG DR Capsules  Quantity: 90 per 30 days  Refills: 0  Days Supply: 3  Medication Instructions: Take 1 capsule (60 mg total) by mouth 3 (three) times a day  Insurance Plan: Medicaid [place]  Insurance Member ID & GRP # : MemberID# [id number] , GRPID# not given  CoverMyMeds Key: [id number]  Informed patient that prior authorizations can take up to 10 business days for response: YES  Okay to leave a detailed message: YES    Route to: [facility] PA MED ([id number])

## 2021-06-10 NOTE — TELEPHONE ENCOUNTER
Medication being requested: Lamictal  Last visit date: 6/29/20 Provider: MANUEL  Next visit date: 10/28/20 Provider: MANUEL  Expected follow up: 4-6 months  MTM visit (Pain Center) date: No  Pertinent between visit information about requested medication (telephone, mychart, prior authorization, concerns): No  Last date prescribed: 6/10/20  Provider responsible: BE  Spoke with patient: fax from pharmacy  Script being sent to provider - dates and quantity:   Requested Prescriptions     Pending Prescriptions Disp Refills     lamoTRIgine (LAMICTAL) 100 MG tablet 45 tablet 1     Sig: One tablet morning, one-half bedtime     Pharmacy cued: [facility]

## 2021-06-10 NOTE — TELEPHONE ENCOUNTER
Adding refill request for duloxetine 60 mg  Requested Prescriptions     Pending Prescriptions Disp Refills     DULoxetine (CYMBALTA) 60 MG capsule 90 capsule 0     Sig: Take 1 capsule (60 mg total) by mouth 3 (three) times a day.     Signed Prescriptions Disp Refills     lamoTRIgine (LAMICTAL) 100 MG tablet 45 tablet 1     Sig: One tablet morning, one-half bedtime     Authorizing Provider: MILADYS DE JESUS

## 2021-06-11 NOTE — ANESTHESIA PREPROCEDURE EVALUATION
Anesthesia Evaluation        Airway   Mallampati: I   Pulmonary - normal exam   (+) a smoker                         Cardiovascular - normal exam  (+) hypertension poorly controlled, ,      Neuro/Psych    (+) depression, chronic pain    Endo/Other       GI/Hepatic/Renal    (+) GERD well controlled,             Dental    (+) poor dentition                       Anesthesia Plan  Planned anesthetic: general LMA and peripheral nerve block  Popliteal fossa/adductor canal blocks  ASA 3   Induction: intravenous   Anesthetic plan and risks discussed with: patient and spouse    Post-op plan: routine recovery

## 2021-06-11 NOTE — ANESTHESIA CARE TRANSFER NOTE
Last vitals:   Vitals:    06/23/17 0900   BP: 173/90   Pulse: (!) 107   Resp: 20   Temp: 37.3  C (99.1  F)   SpO2: 100%     Patient's level of consciousness is awake  Spontaneous respirations: yes  Maintains airway independently: yes  Dentition unchanged: yes  Oropharynx: oropharynx clear of all foreign objects    QCDR Measures:  ASA# 20 - Surgical Safety Checklist: ASA20A - Safety Checks Done  PQRS# 430 - Adult PONV Prevention: 4558F - Pt received => 2 anti-emetic agents (different classes) preop & intraop  ASA# 8 - Peds PONV Prevention: 4558F - Pt received => 2 anti-emetic agents (different classes) preop & intraop  PQRS# 424 - Vania-op Temp Management: 4559F - At least one body temp DOCUMENTED => 35.5C or 95.9F within required timeframe  PQRS# 426 - PACU Transfer Protocol: - Transfer of care checklist used  ASA# 14 - Acute Post-op Pain: ASA14B - Patient did NOT experience pain >= 7 out of 10   The patient is Spont Breathing, responding to commands,  Reflexes  Intact.  VSS

## 2021-06-11 NOTE — TELEPHONE ENCOUNTER
Please see telephone encounter from 9/22/2020.  Medication and quantity has been previously approved until 6/25/2021.  Pharmacist was spoken to at the time on 9/22 and he confirmed they have PA number approval information and had a paid claim.

## 2021-06-11 NOTE — TELEPHONE ENCOUNTER
2020 Prior Authorization Request  Who's requesting PA: Pharmacy  Provider: DR DE JESUS  Pharmacy Name, Location & Phone # : [facility]   Medication Name:DULOXETINE HCI 60 MG  Quantity:90   Refills:0  Days Supply:30 DAYS  Medication Instructions:   Insurance Plan:MEDICAID  Insurance Member ID & GRP # :[id number]  Insurance Plan:BCBS  Insurance Member ID & GRP # :[id number]  CoverMyMeds Key:[id number]  Informed patient that prior authorizations can take up to 10 business days for response: YES  Okay to leave a detailed message: YES    Route to: [facility] MED ([id number])

## 2021-06-11 NOTE — ANESTHESIA POSTPROCEDURE EVALUATION
Patient: Jaime L Franklin-Behrends  LEFT DELTOID AND LATERAL LIGAMENT RECONSTRUCTION   Anesthesia type: general    Patient location: PACU  Last vitals:   Vitals:    06/23/17 1045   BP: 144/90   Pulse: 95   Resp: 14   Temp: 36.8  C (98.3  F)   SpO2: 96%     Post vital signs: stable  Level of consciousness: awake and responds to simple questions  Post-anesthesia pain: pain controlled  Post-anesthesia nausea and vomiting: no  Pulmonary: unassisted, return to baseline  Cardiovascular: stable and blood pressure at baseline  Hydration: adequate  Anesthetic events: no    QCDR Measures:  ASA# 11 - Vania-op Cardiac Arrest: ASA11B - Patient did NOT experience unanticipated cardiac arrest  ASA# 12 - Vania-op Mortality Rate: ASA12B - Patient did NOT die  ASA# 13 - PACU Re-Intubation Rate: ASA13B - Patient did NOT require a new airway mgmt  ASA# 10 - Composite Anes Safety: ASA10A - No serious adverse event  ASA# 38 - New Corneal Injury: ASA38A - No new exposure keratitis or corneal abrasion in PACU    Additional Notes:

## 2021-06-11 NOTE — ANESTHESIA PROCEDURE NOTES
Peripheral Block    Patient location during procedure: pre-op  Start time: 6/23/2017 6:43 AM  End time: 6/23/2017 6:46 AM  post-op analgesia per surgeon order as noted in medical record  Staffing:  Performing  Anesthesiologist: BRANDON CROCKETT  Preanesthetic Checklist  Completed: patient identified, site marked, risks, benefits, and alternatives discussed, timeout performed, consent obtained, airway assessed, oxygen available, suction available, emergency drugs available and hand hygiene performed  Peripheral Block  Block type: saphenous, adductor canal block  Prep: ChloraPrep  Patient position: supine  Patient monitoring: cardiac monitor, continuous pulse oximetry, heart rate and blood pressure  Laterality: left  Injection technique: ultrasound guided    Ultrasound used to visualize needle placement in proximity to nerve being blocked: yes   Permanent ultrasound image captured for medical record  lidocaine 1% local anesthesia used for local skin prep  Needle  Needle type: Stimuplex   Needle gauge: 20G  Needle length: 6 in  no peripheral nerve catheter placed  Assessment  Injection assessment: no difficulty with injection, negative aspiration for heme, no paresthesia on injection and incremental injection

## 2021-06-11 NOTE — TELEPHONE ENCOUNTER
Medication being requested:  Duloxetine  Last visit date: 6/29   Provider: MANUEL  Next visit date: 10/28   Provider:  MANUEL  Expected follow up: 4-6 months  MTM visit (Pain Center) date: na  UDT date: na  Agreement date: na   (Last fill date; name; strength; provider; MME; quantity): na    Pertinent between visit information about requested medication (telephone, mychart, prior authorization, concerns, comments): Continue with the duloxetine 60 mg 3 tablets daily.  Script being sent to provider by nurse- dates and quantity:   Requested Prescriptions     Pending Prescriptions Disp Refills     DULoxetine (CYMBALTA) 60 MG capsule 90 capsule 0     Sig: Take 1 capsule (60 mg total) by mouth 3 (three) times a day.     Pharmacy cued: [facility]  Standing orders for withdrawal protocol implemented: na

## 2021-06-11 NOTE — TELEPHONE ENCOUNTER
2020 Prior Authorization Request  Who's requesting PA: Pharmacy  Provider: Jayesh  Pharmacy Name, Location & Phone # : [id number] - [place], [place] - [address] [place] [address] [phone]  Medication Name: Duloxetine HCI 60mg dr capsules, 3/day  Quantity: 90  Refills: 0  Days Supply: 30  Medication Instructions: Take 1 capsule (60 mg total) by mouth 3 (three) times a day  Insurance Plan: MEDICAID [place]   Insurance Member ID & GRP # : [id number]   CoverMyMeds Key: [id number]  Informed patient that prior authorizations can take up to 10 business days for response: YES  Okay to leave a detailed message: YES    Route to: [facility] MED ([id number])

## 2021-06-11 NOTE — ANESTHESIA PROCEDURE NOTES
Peripheral Block    Patient location during procedure: pre-op  Start time: 6/23/2017 6:38 AM  End time: 6/23/2017 6:42 AM  post-op analgesia per surgeon order as noted in medical record  Staffing:  Performing  Anesthesiologist: BRANDON CROCKETT  Preanesthetic Checklist  Completed: patient identified, site marked, risks, benefits, and alternatives discussed, timeout performed, consent obtained, airway assessed, oxygen available, suction available, emergency drugs available and hand hygiene performed  Peripheral Block  Block type: sciatic, popliteal  Prep: ChloraPrep  Patient position: supine  Patient monitoring: cardiac monitor, continuous pulse oximetry, heart rate and blood pressure  Laterality: left  Injection technique: ultrasound guided    Ultrasound used to visualize needle placement in proximity to nerve being blocked: yes   Permanent ultrasound image captured for medical record  lidocaine 1% local anesthesia used for local skin prep  Needle  Needle type: Stimuplex   Needle gauge: 20G  Needle length: 6 in  no peripheral nerve catheter placed  Assessment  Injection assessment: no difficulty with injection, negative aspiration for heme, no paresthesia on injection and incremental injection

## 2021-06-12 NOTE — PROGRESS NOTES
"Assessment/Plan:     Problem List Items Addressed This Visit     Depression, unspecified depression type    Chronic ankle pain      Other Visit Diagnoses     Chronic pain syndrome                No follow-ups on file.    There are no Patient Instructions on file for this visit.      Subjective:       55 y.o. female The patient has been notified of the following:      \"We have found that certain health care needs can be provided without the need for a face to face visit.  This service lets us provide the care you need with a phone conversation.       I will have full access to your [facility] medical record during this entire phone call.   I will be taking notes for your medical record.      Since this is like an office visit, we will bill your insurance company for this service.       There are potential benefits and risks of telephone visits (e.g. limits to patient confidentiality) that differ from in-person visits.?  Confidentiality still applies for telephone services, and nobody will record the visit.  It is important to be in a quiet, private space that is free of distractions (including cell phone or other devices) during the visit.??      If during the course of the call I believe a telephone visit is not appropriate, you will not be charged for this service\"     Consent has been obtained for this service by care team member: Yes      Kain is \"pretty good\".  She has relocated to a [facility] in [place] which she likes.  Her daughter and 1-year-old grandchild will stay with her for a while and that is working out okay.    Since last seen she has clarified the prior authorization for the duloxetine 60 mg 3 times a day, to continue with the lamotrigine 150 mg daily, appropriate 300 mg daily.  She feels she is doing fairly well in terms of anxiety and mood and does not wish to make any changes.  Occasionally has some agoraphobia but not like before and is able to go out and do what she needs to do      she continues " in the [facility] methadone program reports that is going well likes her counselor.    Continue with the gabapentin 600 mg 3 times a day which seems to help pain.    Continues with the medical cannabis using the high THC vaporizer which helps with pain.    She continues with some vomiting, and another 15 pound weight loss, has endoscopy scheduled next week.  She does not associate this to medical cannabis or any of her other medications and feels that this is being appropriately managed.    She does plan to look into finding the surgeon who last evaluated her, though did leave [facility] orth[facility].  She has had both knees replaced, 8 previous surgeries in that ankle, and was to have her knees addressed before they would do further surgeries, she is now ready to look into this.      Current Outpatient Medications:      acetaminophen (TYLENOL) 500 MG tablet, Take 1,000 mg by mouth every 8 (eight) hours as needed., Disp: , Rfl:      amLODIPine (NORVASC) 10 MG tablet, Take 10 mg by mouth daily., Disp: , Rfl:      amoxicillin-clavulanate (AUGMENTIN) 500-125 mg per tablet, Take 1 tablet by mouth., Disp: , Rfl:      buPROPion (WELLBUTRIN XL) 300 MG 24 hr tablet, Take 1 tablet (300 mg total) by mouth daily., Disp: 30 tablet, Rfl: 0     cyanocobalamin 1000 MCG tablet, Take 1,000 mcg by mouth daily., Disp: , Rfl:      DULoxetine (CYMBALTA) 60 MG capsule, Take 1 capsule (60 mg total) by mouth 3 (three) times a day., Disp: 90 capsule, Rfl: 2     ergocalciferol (ERGOCALCIFEROL) 50,000 unit capsule, Take 1 capsule (50,000 Units total) by mouth every 7 days., Disp: 4 capsule, Rfl: 0     gabapentin (NEURONTIN) 600 MG tablet, Take 1 tablet (600 mg total) by mouth 2 (two) times a day., Disp: 56 tablet, Rfl: 3     lamoTRIgine (LAMICTAL) 100 MG tablet, One tablet morning, one-half bedtime, Disp: 45 tablet, Rfl: 1     medical cannabis (Patient's own supply), 1 Dose by Other route see administration instructions. (The purpose of this  "order is to document that the patient reports taking medical cannabis. This is not a prescription, and is not used to certify that the patient has a  qualifying medical condition.)  Vaping; 5 puffs inhaled 4-5 times per day, Disp: , Rfl:      methadone (DOLOPHINE) 10 mg/mL solution, Take 150 mg by mouth daily. Goes to pain clinic 0630 am everyday    , Disp: , Rfl:      omeprazole (PRILOSEC) 20 MG capsule, Take 20 mg by mouth daily before breakfast., Disp: , Rfl:      polyethylene glycol (MIRALAX) 17 gram packet, Take 1 packet (17 g total) by mouth daily. (Patient taking differently: Take 17 g by mouth daily as needed. ), Disp: , Rfl: 0     prochlorperazine (COMPAZINE) 5 MG tablet, Take 1 tablet by mouth every 8 (eight) hours as needed., Disp: , Rfl:      senna-docusate (PERICOLACE) 8.6-50 mg tablet, Take 1 tablet by mouth 2 (two) times a day. (Patient taking differently: Take 1 tablet by mouth 2 (two) times a day as needed. ), Disp: 30 tablet, Rfl: 4     tiZANidine (ZANAFLEX) 4 MG tablet, four tabs bedtime and may repeat after four hours, Disp: 240 tablet, Rfl: 3     aspirin 325 MG tablet, Take 1 tablet (325 mg total) by mouth daily. (Patient taking differently: Take 325 mg by mouth daily as needed. 1 tab every 6 hours, as needed), Disp: 42 tablet, Rfl: 0     nicotine (NICODERM CQ) 14 mg/24 hr, Place 1 patch on the skin., Disp: , Rfl:      varenicline (CHANTIX) 1 mg tablet, Take 1 mg by mouth 2 (two) times a day with meals.    , Disp: , Rfl:     Telephone sounds alert, clear sensorium.  Thought process logical.  Affect is fairly full range.     Objective:     Vitals:    10/28/20 1244   Weight: 165 lb (74.8 kg)   Height: 5' 6\" (1.676 m)       Assessment: Status post knee replacements, multiple lower extremity surgeries.    Presently on methadone program after developing opioid addiction.    She reports that her comorbid mood disorder with agoraphobia is relatively stable on the above psychotropics.    Plan: Continue " agents as above, continue monitoring in the medical cannabis program she reports has been helpful.    Total time more than 15 minutes.      This note has been dictated using voice recognition software. Any grammatical or context distortions are unintentional and inherent to the software

## 2021-06-12 NOTE — PATIENT INSTRUCTIONS - HE
PLAN:  Continue the medical cannabis program.    Continue lamotrigine, duloxetine, and bupropion as you are.    Discussed you are trying to find the previous surgeon that evaluated your ankle.    Follow-up with Dr. Ramirez in 4 to 5 months.

## 2021-06-12 NOTE — PROGRESS NOTES
"Jaime L Franklin Behrends is a 55 y.o. female who is being evaluated via a billable telephone visit.      The patient has been notified of following:     \"This telephone visit will be conducted via a call between you and your physician/provider. We have found that certain health care needs can be provided without the need for a physical exam.  This service lets us provide the care you need with a short phone conversation.  If a prescription is necessary we can send it directly to your pharmacy.  If lab work is needed we can place an order for that and you can then stop by our lab to have the test done at a later time.    Telephone visits are billed at different rates depending on your insurance coverage. During this emergency period, for some insurers they may be billed the same as an in-person visit.  Please reach out to your insurance provider with any questions.    If during the course of the call the physician/provider feels a telephone visit is not appropriate, you will not be charged for this service.\"    Patient has given verbal consent to a Telephone visit? Yes    What phone number would you like to be contacted at? [phone]    Patient would like to receive their AVS by AVS Preference: Mail a copy.    Patient is here for a follow up appointment with Dr. Ramirez. Patient has focus on mental health. Patient needs refills for lamotrigine, duloxetine, tizanidine and gabapentin.  CSA and UDT are deferred due to COVID 19.     Kaley Taveras LPN    "

## 2021-06-14 NOTE — TELEPHONE ENCOUNTER
Refill request: lamotrigine 100 mg  Last ov with BE: 10/28/20  10/30/20: PA approval for duloxetine  Needs to schedule a follow up apt with BE  8 weeks    Requested Prescriptions     Pending Prescriptions Disp Refills     lamoTRIgine (LAMICTAL) 100 MG tablet 45 tablet 1     Sig: One tablet morning, one-half bedtime     [facility]

## 2021-06-15 NOTE — ANESTHESIA CARE TRANSFER NOTE
Last vitals:   Vitals:    01/26/18 1436   BP: (!) 170/97   Pulse: 87   Resp: (!) 34   Temp:    SpO2: 100%     Patient's level of consciousness is drowsy  Spontaneous respirations: yes  Maintains airway independently: yes  Dentition unchanged: yes  Oropharynx: oropharynx clear of all foreign objects    QCDR Measures:  ASA# 20 - Surgical Safety Checklist: WHO surgical safety checklist completed prior to induction  PQRS# 430 - Adult PONV Prevention: 4558F - Pt received => 2 anti-emetic agents (different classes) preop & intraop  ASA# 8 - Peds PONV Prevention: NA - Not pediatric patient, not GA or 2 or more risk factors NOT present  PQRS# 424 - Vania-op Temp Management: 4559F - At least one body temp DOCUMENTED => 35.5C or 95.9F within required timeframe  PQRS# 426 - PACU Transfer Protocol: - Transfer of care checklist used  ASA# 14 - Acute Post-op Pain: ASA14A - Patient experienced pain >= 7 out of 10

## 2021-06-15 NOTE — PROGRESS NOTES
"Assessment/Plan:     Problem List Items Addressed This Visit     Depression, unspecified depression type    Relevant Medications    DULoxetine (CYMBALTA) 60 MG capsule    lamoTRIgine (LAMICTAL) 100 MG tablet    Chronic ankle pain    Relevant Medications    DULoxetine (CYMBALTA) 60 MG capsule      Other Visit Diagnoses     Chronic pain syndrome        Relevant Medications    gabapentin (NEURONTIN) 600 MG tablet              No follow-ups on file.    Patient Instructions   PLAN:  Continue the Lamictal 100 mg  1-1/2 tablets daily.    Continue the Cymbalta 60 mg 3 tablets daily.    Continue gabapentin 600 mg twice a day.    Continue work with Netta in therapy.    You may discuss with your primary care provider whether she can take over management of these medications, otherwise follow-up with Dr. Ramirez in 4 to 6 months.        Subjective:       55 y.o. female The patient has been notified of the following:      \"We have found that certain health care needs can be provided without the need for a face to face visit.  This service lets us provide the care you need with a phone conversation.       I will have full access to your [facility] medical record during this entire phone call.   I will be taking notes for your medical record.      Since this is like an office visit, we will bill your insurance company for this service.       There are potential benefits and risks of telephone visits (e.g. limits to patient confidentiality) that differ from in-person visits.?  Confidentiality still applies for telephone services, and nobody will record the visit.  It is important to be in a quiet, private space that is free of distractions (including cell phone or other devices) during the visit.??      If during the course of the call I believe a telephone visit is not appropriate, you will not be charged for this service\"     Consent has been obtained for this service by care team member: Yes      Patient has been followed with " "chronic pain including lower extremity with history of fractures, and more focused on anxiety with agoraphobia.    By telephone reports \"great\", nice to be able to say doing well\".  She continues getting established in her new home.  She notes over challenges coming into a condo where some of the residents have been there 30 years and slow to exceptor.    She reports otherwise doing well, agoraphobia is not been an issue.  She continues going to the methadone program get several days of take homes.    She was in the hospital last week for a kidney infection.  No clear precipitant, had had the 15 years ago.  She would be follow-up with urologist.    She continues on lamotrigine 100 mg 1 in the morning one half at bedtime, gabapentin 600 mg twice a day, and the Cymbalta 60 mg tablets 2 in the morning 1 at bedtime.  We describe this is unusual dose though she describes there was a \"mixup in my fever\" and now this dose is proven very helpful for mood and anxiety, agoraphobia symptoms.  No side effects.      Current Outpatient Medications:      acetaminophen (TYLENOL) 500 MG tablet, Take 1,000 mg by mouth every 8 (eight) hours as needed., Disp: , Rfl:      amLODIPine (NORVASC) 10 MG tablet, Take 10 mg by mouth daily., Disp: , Rfl:      aspirin 325 MG tablet, Take 1 tablet (325 mg total) by mouth daily. (Patient taking differently: Take 325 mg by mouth daily as needed. 1 tab every 6 hours, as needed), Disp: 42 tablet, Rfl: 0     buPROPion (WELLBUTRIN XL) 300 MG 24 hr tablet, Take 1 tablet (300 mg total) by mouth daily., Disp: 30 tablet, Rfl: 0     cefdinir (OMNICEF) 300 MG capsule, Take 300 mg by mouth 2 (two) times a day., Disp: , Rfl:      cyanocobalamin 1000 MCG tablet, Take 1,000 mcg by mouth daily., Disp: , Rfl:      DULoxetine (CYMBALTA) 60 MG capsule, 2 capsules by mouth in the morning and 1 capsule by mouth at HS, Disp: 90 capsule, Rfl: 2     ergocalciferol (ERGOCALCIFEROL) 50,000 unit capsule, Take 1 capsule " (50,000 Units total) by mouth every 7 days. (Patient taking differently: Take 50,000 Units by mouth every 36 (thirty-six) hours. ), Disp: 4 capsule, Rfl: 0     ferrous sulfate 325 (65 FE) MG tablet, Take 325 mg by mouth daily with breakfast., Disp: , Rfl:      gabapentin (NEURONTIN) 600 MG tablet, Take 1 tablet (600 mg total) by mouth 2 (two) times a day., Disp: 56 tablet, Rfl: 3     Lactobacillus rhamnosus GG (CULTURELLE) 15 billion cell CpSP, Take 1 capsule by mouth daily., Disp: , Rfl:      lamoTRIgine (LAMICTAL) 100 MG tablet, One tablet morning, one-half bedtime, Disp: 45 tablet, Rfl: 1     medical cannabis (Patient's own supply), 1 Dose by Other route see administration instructions. (The purpose of this order is to document that the patient reports taking medical cannabis. This is not a prescription, and is not used to certify that the patient has a  qualifying medical condition.)  Vaping; 5 puffs inhaled 4-5 times per day, Disp: , Rfl:      methadone (DOLOPHINE) 10 mg/mL solution, Take 150 mg by mouth daily. Goes to pain clinic 0630 am everyday    , Disp: , Rfl:      omeprazole (PRILOSEC) 20 MG capsule, Take 20 mg by mouth daily before breakfast., Disp: , Rfl:      polyethylene glycol (MIRALAX) 17 gram packet, Take 1 packet (17 g total) by mouth daily. (Patient taking differently: Take 17 g by mouth daily as needed. ), Disp: , Rfl: 0     prochlorperazine (COMPAZINE) 5 MG tablet, Take 1 tablet by mouth every 8 (eight) hours as needed., Disp: , Rfl:      tiZANidine (ZANAFLEX) 4 MG tablet, four tabs bedtime and may repeat after four hours, Disp: 240 tablet, Rfl: 3     varenicline (CHANTIX) 1 mg tablet, Take 1 mg by mouth 2 (two) times a day with meals.    , Disp: , Rfl:      nicotine (NICODERM CQ) 14 mg/24 hr, Place 1 patch on the skin., Disp: , Rfl:      senna-docusate (PERICOLACE) 8.6-50 mg tablet, Take 1 tablet by mouth 2 (two) times a day. (Patient taking differently: Take 1 tablet by mouth 2 (two) times a  "day as needed. ), Disp: 30 tablet, Rfl: 4    By telephone sounds alert, clear sensorium, thought process logical.  Affect is fairly full range.               Objective:     Vitals:    02/10/21 1405   Height: 5' 6\" (1.676 m)   PainSc:   2       Assessment: Anxiety disorder with agoraphobia, doing well of the above medication regimen.    She continues going to the methadone program.    We discussed she could talk with her primary care provider about transitioning back to ongoing management there, and receiving consultation is needed.    Total time more than 15 minutes.            This note has been dictated using voice recognition software. Any grammatical or context distortions are unintentional and inherent to the software  "

## 2021-06-15 NOTE — TELEPHONE ENCOUNTER
2020 Prior Authorization Request  Who's requesting PA: Pharmacy  Provider: James  Pharmacy Name, Location & Phone # : [facility]   Medication Name: Duloxetine HCI 60mg dr capsules, 3/day  Insurance Plan: MEDICAID [place]  Insurance Member ID#: [id number]   CoverMyMeds Key:   Informed patient that prior authorizations can take up to 10 business days for response: YES  Okay to leave a detailed message: YES

## 2021-06-15 NOTE — ANESTHESIA PREPROCEDURE EVALUATION
Anesthesia Evaluation      Patient summary reviewed   No history of anesthetic complications     Airway   Mallampati: II  Neck ROM: full   Pulmonary - normal exam    breath sounds clear to auscultation  (+) a smoker    ROS comment: Currently smokes 1/2 ppd                         Cardiovascular - normal exam  (+) hypertension, ,     Rate: normal,         Neuro/Psych    (+) depression, chronic pain    Endo/Other    (+) obesity,      GI/Hepatic/Renal    (+) GERD well controlled,             Dental - normal exam                        Anesthesia Plan  Planned anesthetic: peripheral nerve block and MAC  Plan popliteal block and saphenous block for post-op pain per surgeon request.  MAC vs. LMA for procedure  ASA 3   Induction: intravenous   Anesthetic plan and risks discussed with: patient    Post-op plan: routine recovery

## 2021-06-15 NOTE — ANESTHESIA PROCEDURE NOTES
Peripheral Block    Patient location during procedure: pre-op  Start time: 1/26/2018 10:23 AM  End time: 1/26/2018 10:28 AM  post-op analgesia per surgeon order as noted in medical record  Staffing:  Performing  Anesthesiologist: KRISTIN SAWYER  Preanesthetic Checklist  Completed: patient identified, site marked, risks, benefits, and alternatives discussed, timeout performed, consent obtained, airway assessed, oxygen available, suction available, emergency drugs available and hand hygiene performed  Peripheral Block  Block type: saphenous (adductor canal)  Prep: ChloraPrep  Patient position: supine  Patient monitoring: cardiac monitor, continuous pulse oximetry, blood pressure and heart rate  Laterality: left  Injection technique: ultrasound guided    Ultrasound used to visualize needle placement in proximity to nerve being blocked: yes   Permanent ultrasound image captured for medical record  Sterile gel and probe cover used for ultrasound.    Needle  Needle type: Stimuplex   Needle gauge: 21 G  Needle length: 6 in  no peripheral nerve catheter placed  Assessment  Injection assessment: negative aspiration for heme, no paresthesia on injection and no difficulty with injection

## 2021-06-15 NOTE — PROGRESS NOTES
Jaime L Franklin Behrends is a 55 y.o. female who is being evaluated via a billable telephone visit.      What phone number would you like to be contacted at? [phone]  How would you like to obtain your AVS? AVS Preference: Mail a copy.    Jaime L Franklin Behrends is 55 y.o. and presents today for the following health issues for focus on mental health.    Pain score: 2  Patient needs refills on Gabapentin, duloxetine and lamotrigine.    Kaley Taveras LPN

## 2021-06-15 NOTE — ANESTHESIA POSTPROCEDURE EVALUATION
Patient: Jaime L Franklin-Behrends  LEFT ANKLE ARTHRODESIS, HARDWARE REMOVAL, LEFT ANKLE ARTHROSCOPY  Anesthesia type: regional    Patient location: PACU  Last vitals:   Vitals:    01/26/18 1720   BP: (!) 119/93   Pulse: 82   Resp: 18   Temp: 36.8  C (98.2  F)   SpO2: 95%     Post vital signs: stable  Level of consciousness: awake and responds to simple questions  Post-anesthesia pain: pain controlled  Post-anesthesia nausea and vomiting: no  Pulmonary: unassisted, return to baseline  Cardiovascular: stable and blood pressure at baseline  Hydration: adequate  Anesthetic events: no    QCDR Measures:  ASA# 11 - Vania-op Cardiac Arrest: ASA11B - Patient did NOT experience unanticipated cardiac arrest  ASA# 12 - Vania-op Mortality Rate: ASA12B - Patient did NOT die  ASA# 13 - PACU Re-Intubation Rate: ASA13B - Patient did NOT require a new airway mgmt  ASA# 10 - Composite Anes Safety: ASA10A - No serious adverse event    Additional Notes:  Residual pain in pacu treated with IV narcotics and tylenol.  No complications.

## 2021-06-15 NOTE — ANESTHESIA PROCEDURE NOTES
Peripheral Block    Patient location during procedure: pre-op  Start time: 1/26/2018 10:18 AM  End time: 1/26/2018 10:23 AM  post-op analgesia per surgeon order as noted in medical record  Staffing:  Performing  Anesthesiologist: KRISTIN SAWYER  Preanesthetic Checklist  Completed: patient identified, site marked, risks, benefits, and alternatives discussed, timeout performed, consent obtained, airway assessed, oxygen available, suction available, emergency drugs available and hand hygiene performed  Peripheral Block  Block type: sciatic (popliteal)  Prep: ChloraPrep  Patient position: supine  Patient monitoring: cardiac monitor, continuous pulse oximetry, heart rate and blood pressure  Laterality: left  Injection technique: ultrasound guided    Ultrasound used to visualize needle placement in proximity to nerve being blocked: yes   Permanent ultrasound image captured for medical record  Sterile gel and probe cover used for ultrasound.    Needle  Needle type: Stimuplex   Needle gauge: 21 G  Needle length: 4 in    Assessment  Injection assessment: negative aspiration for heme, no paresthesia on injection and no difficulty with injection

## 2021-06-15 NOTE — PATIENT INSTRUCTIONS - HE
PLAN:  Continue the Lamictal 100 mg  1-1/2 tablets daily.    Continue the Cymbalta 60 mg 3 tablets daily.    Continue gabapentin 600 mg twice a day.    Continue work with Netta in therapy.    You may discuss with your primary care provider whether she can take over management of these medications, otherwise follow-up with Dr. Ramirez in 4 to 6 months.

## 2021-06-16 NOTE — TELEPHONE ENCOUNTER
Telephone Encounter by Veronica Marshall at 1/21/2019  2:34 PM     Author: Veronica Marshall Service: -- Author Type: --    Filed: 1/21/2019  2:34 PM Encounter Date: 1/21/2019 Status: Signed    : Veronica Marshall       [facility] team  [phone]    PA has been initiated.

## 2021-06-16 NOTE — TELEPHONE ENCOUNTER
Telephone Encounter by Laura Blake at 1/29/2019 12:04 PM     Author: Laura Blake Service: -- Author Type: --    Filed: 1/29/2019 12:05 PM Encounter Date: 1/28/2019 Status: Signed    : Laura Blake APPROVED:    Approval start date:01/27/2019  Approval end date:01/27/2020    Pharmacy has been notified of approval and will contact patient when medication is ready for pickup.

## 2021-06-16 NOTE — TELEPHONE ENCOUNTER
Refill request: tizanidine   Last ov with BE:2/10/21  Follow up in 8 weeks  Next ov with BE: needs to be scheduled  Last prescribed by BE:10/28/21 with 3 refills  No red flags  Requested Prescriptions     Pending Prescriptions Disp Refills     tiZANidine (ZANAFLEX) 4 MG tablet 240 tablet 3     Sig: four tabs bedtime and may repeat after four hours     Nayeli

## 2021-06-16 NOTE — TELEPHONE ENCOUNTER
Telephone Encounter by Kaley Taveras LPN at 3/19/2020  1:36 PM     Author: Kaley Taveras LPN Service: -- Author Type: Licensed Nurse    Filed: 3/19/2020  1:48 PM Encounter Date: 3/19/2020 Status: Signed    : Kaley Taveras LPN (Licensed Nurse)       Medication being requested: gabapentin  Last visit date: 1/31/2020.  Provider: BE  Next visit date: 4/28/2020.  Provider: BE  Expected follow up: 2-3 months  [facility] visit ([facility] date: defer to RN  UDT date: UDS/CSA=NA  Agreement date: UDS/CSA=NA   snipped in:    Pertinent between visit information about requested medication (telephone, mychart, prior authorization, concerns, comments): defer to RN  Script being sent to provider by nurse- dates and quantity:   Requested Prescriptions     Pending Prescriptions Disp Refills   ? gabapentin (NEURONTIN) 600 MG tablet 56 tablet 0     Sig: Take 1 tablet (600 mg total) by mouth 2 (two) times a day.     Requested Prescriptions      No prescriptions requested or ordered in this encounter     Pharmacy cued: [facility]  Standing orders for withdrawal protocol implemented: NO

## 2021-06-16 NOTE — TELEPHONE ENCOUNTER
Telephone Encounter by Veronica Marshall at 1/22/2019  1:59 PM     Author: Veronica Marshall Service: -- Author Type: --    Filed: 1/22/2019  2:00 PM Encounter Date: 1/21/2019 Status: Signed    : Veronica Marshall APPROVED:    Approval start date: 1/20/19  Approval end date: 2/20/19    Pharmacy has been notified of approval and will contact patient when medication is ready for pickup.

## 2021-06-16 NOTE — TELEPHONE ENCOUNTER
Telephone Encounter by Veronica Marshall at 5/8/2019 10:55 AM     Author: Veronica Marshall Service: -- Author Type: --    Filed: 5/8/2019 11:02 AM Encounter Date: 5/8/2019 Status: Signed    : Veronica Marshall       There is already a PA approval on file for patient for medication and quantity requested good through 2/23/2020.    Called [facility] and per tech they are running a different NDC# than what [facility] approved.  Tech will call to change NDC# to what they are processing it for.    If they have further issues they are going to call me back directly [phone].

## 2021-06-16 NOTE — TELEPHONE ENCOUNTER
Telephone Encounter by Veronica Marshall at 1/15/2020  3:57 PM     Author: Veronica Marshall Service: -- Author Type: --    Filed: 1/15/2020  3:58 PM Encounter Date: 1/14/2020 Status: Signed    : Veronica Marshall APPROVED:    Approval start date: 1/1/2020  Approval end date:  12/25/2020     Pharmacy has been notified of approval and will contact patient when medication is ready for pickup.

## 2021-06-16 NOTE — TELEPHONE ENCOUNTER
Telephone Encounter by Michelle Gimenez RN at 2/5/2020 11:59 AM     Author: Michelle Gimenez RN Service: -- Author Type: Registered Nurse    Filed: 2/5/2020 12:06 PM Encounter Date: 1/10/2020 Status: Signed    : Michelle Gimenez RN (Registered Nurse)       Calling today for refill of Gabapentin.  Saw Dr recently and had 2 other meds sent to the pharmacy, but not  Gabapentin.   No documentation of continuing Gabapentin.   Will review with Dr Ramirez.      Per

## 2021-06-16 NOTE — TELEPHONE ENCOUNTER
Telephone Encounter by Veronica Marshall at 3/25/2019  2:56 PM     Author: Veronica Marshall Service: -- Author Type: --    Filed: 3/25/2019  2:57 PM Encounter Date: 3/25/2019 Status: Signed    : Veronica Marshall APPROVED:    Approval start date: 3/01/2019  Approval end date:2/23/2020    Pharmacy has been notified of approval and will contact patient when medication is ready for pickup.

## 2021-06-16 NOTE — TELEPHONE ENCOUNTER
Telephone Encounter by Laura Blake at 1/28/2019  2:48 PM     Author: Laura Blake Service: -- Author Type: --    Filed: 1/28/2019  2:51 PM Encounter Date: 1/28/2019 Status: Signed    : Laura Blake PA team  [phone]    PA has been initiated.

## 2021-06-17 NOTE — TELEPHONE ENCOUNTER
Telephone Encounter by Brandy Nayak at 6/10/2020  4:40 PM     Author: Brandy Nayak Service: -- Author Type: --    Filed: 6/10/2020  4:44 PM Encounter Date: 6/5/2020 Status: Signed    : Brandy Nayak       PRIOR AUTHORIZATION NOT NEEDED    My request was disregarded as there was already on on-file for the patient. Approved 01/16/2020-12/25/2020. PA # [id number]

## 2021-06-17 NOTE — PROGRESS NOTES
Pain score: 7  Constant or intermittent: constant  What does your pain feel like: shooting, radiating and dull  Does the pain interfere with:   Work: yes  Walking/distance: yes  Sleep: yes  Daily activities: yes  Relationships/social life: yes  Mood: yes  F= 8    Kaley Taveras LPN

## 2021-06-17 NOTE — PROGRESS NOTES
Assessment/Plan:     Problem List Items Addressed This Visit     Depression, unspecified depression type    Relevant Medications    DULoxetine (CYMBALTA) 60 MG capsule    lamoTRIgine (LAMICTAL) 100 MG tablet    Chronic ankle pain    Relevant Medications    DULoxetine (CYMBALTA) 60 MG capsule    Arthritis of right knee    Relevant Medications    senna-docusate (PERICOLACE) 8.6-50 mg tablet      Other Visit Diagnoses     Chronic pain syndrome    -  Primary    Relevant Medications    gabapentin (NEURONTIN) 600 MG tablet    Other Relevant Orders    Ambulatory referral to [facility] (includes FPA groups)    Pain Management Drug Panel, Urine              No follow-ups on file.    Patient Instructions   PLAN:    Urine drug test and controlled substance agreement today.    Sign release information and Dr. Ramirez to speak to your methadone counselor.    Referral to [facility] orthopedics for your feet    Hydrocodone milligrams every 6 hours as needed, #30.    When you see your primary care provider next week may discuss having her take over your lamotrigine gabapentin and Cymbalta.    Continue the medical cannabis program.    Follow-up with Dr. Ramirez in 2 months        Subjective:       55 y.o. female followed for agoraphobia.  Chronic pain includes multiple knee surgeries and left ankle surgeries.    Today reviews has had pain problems on her left ankle which she has had 8 surgeries.  It can swell if she is standing much of a day 3 days in a row.  She has worn compression stockings in the past.  She shows pictures were can be quite edematous.  The surgeon that she worked in the past has retired.    She has had concerns also with her right foot.  It appears she is developing hammertoe in her right great toe.  The next toes above that are raised and contracted.  This is occurred for 5 months ago.  Is been getting worse.  We discussed perhaps there was an injury to the ligaments  for those and she acknowledge it could be the case.  Has been quite painful she wishes discussed pain medications.  She can only wear very loose slippers for shoe wear    She has not been seeing [facility] in therapy, rather works with a counselor at [place].    She continues with the medical cannabis program, has been using high THC vaporizer.    She is on methadone 150 mg.  She is wondering about tapering that down as she feels it is less helpful for pain.  She was on Suboxone in the past.    She entered the methadone program as she was concerned she was having a problem with the reliance on opioids.  Denies ever having behavior such as snorting or shooting medications, rather was just over preoccupied.  She has discussed the possibility pain medications with her counselor she was encouraged to bring them up-to-date.     reviewed, reflecting just the gabapentin 600 mg twice a day.    She continues with the lamotrigine duloxetine and gabapentin which is been very helpful with her agoraphobia and she is quite pleased.  She does see her primary care provider next week.    She is also had x-rays and has an abdominal hernia be working with the surgeon in that regard.       Current Outpatient Medications:      acetaminophen (TYLENOL) 500 MG tablet, Take 1,000 mg by mouth every 8 (eight) hours as needed., Disp: , Rfl:      amLODIPine (NORVASC) 10 MG tablet, Take 10 mg by mouth daily., Disp: , Rfl:      aspirin 325 MG tablet, Take 1 tablet (325 mg total) by mouth daily. (Patient taking differently: Take 325 mg by mouth daily as needed. 1 tab every 6 hours, as needed), Disp: 42 tablet, Rfl: 0     buPROPion (WELLBUTRIN XL) 300 MG 24 hr tablet, Take 1 tablet (300 mg total) by mouth daily., Disp: 30 tablet, Rfl: 0     cyanocobalamin 1000 MCG tablet, Take 1,000 mcg by mouth daily., Disp: , Rfl:      DULoxetine (CYMBALTA) 60 MG capsule, 2 capsules by mouth in the morning and 1 capsule by mouth at HS, Disp: 90 capsule, Rfl: 5      ergocalciferol (ERGOCALCIFEROL) 50,000 unit capsule, Take 1 capsule (50,000 Units total) by mouth every 7 days. (Patient taking differently: Take 50,000 Units by mouth every 36 (thirty-six) hours. ), Disp: 4 capsule, Rfl: 0     ferrous sulfate 325 (65 FE) MG tablet, Take 325 mg by mouth daily with breakfast., Disp: , Rfl:      gabapentin (NEURONTIN) 600 MG tablet, Take 1 tablet (600 mg total) by mouth 2 (two) times a day., Disp: 56 tablet, Rfl: 3     Lactobacillus rhamnosus GG (CULTURELLE) 15 billion cell CpSP, Take 1 capsule by mouth daily., Disp: , Rfl:      lamoTRIgine (LAMICTAL) 100 MG tablet, One tablet morning, one-half bedtime, Disp: 45 tablet, Rfl: 5     medical cannabis (Patient's own supply), 1 Dose by Other route see administration instructions. (The purpose of this order is to document that the patient reports taking medical cannabis. This is not a prescription, and is not used to certify that the patient has a  qualifying medical condition.)  Vaping; 5 puffs inhaled 4-5 times per day, Disp: , Rfl:      methadone (DOLOPHINE) 10 mg/mL solution, Take 150 mg by mouth daily. Goes to pain clinic 0630 am everyday    , Disp: , Rfl:      nicotine (NICODERM CQ) 14 mg/24 hr, Place 1 patch on the skin., Disp: , Rfl:      omeprazole (PRILOSEC) 20 MG capsule, Take 20 mg by mouth daily before breakfast., Disp: , Rfl:      polyethylene glycol (MIRALAX) 17 gram packet, Take 1 packet (17 g total) by mouth daily. (Patient taking differently: Take 17 g by mouth daily as needed. ), Disp: , Rfl: 0     prochlorperazine (COMPAZINE) 5 MG tablet, Take 1 tablet by mouth every 8 (eight) hours as needed., Disp: , Rfl:      senna-docusate (PERICOLACE) 8.6-50 mg tablet, Take 1 tablet by mouth 2 (two) times a day as needed., Disp: 60 tablet, Rfl: 3     tiZANidine (ZANAFLEX) 4 MG tablet, four tabs bedtime and may repeat after four hours, Disp: 240 tablet, Rfl: 3     varenicline (CHANTIX) 1 mg tablet, Take 1 mg by mouth 2 (two) times a  "day with meals.    , Disp: , Rfl:      HYDROcodone-acetaminophen 5-325 mg per tablet, Take 1 tablet by mouth every 6 (six) hours as needed for pain., Disp: 30 tablet, Rfl: 0    Alert, clear sensorium.  Process logical.  She has tanned skin acknowledges spending a lot of time on her porch.    The left foot has multiple scars, along the length of her lateral and medial foot.  There is some swelling.    The right toe is noted marked the laterally deviated toe with bunion, second and third toe deviated medially and above the great toe.          Objective:     Vitals:    05/07/21 1136   BP: (!) 172/113   Pulse: 92   Weight: 178 lb (80.7 kg)   Height: 5' 6\" (1.676 m)   PainSc:   7     Assessment: History of multiple surgeries the left ankle, going to increase pain and time to revise.  Right foot appears to have marked concern with bunion, likely ligamentous damage to the next toes.    Is in a methadone program.  Is using medical cannabis.    Plan: She has signed a release and I have left a message with her counselor.  She felt she would do best on hydrocodone.  Reviewing the record she did have some oxycodone 5 mg #24 last August from her previous podiatrist who is retired.  She will be given hydrocodone 5 mg every 6 hours as needed #30.        Referral made to the [facility] orthopedics.    She will discuss possibly transitioning off methadone.    Total time more 25 minutes              This note has been dictated using voice recognition software. Any grammatical or context distortions are unintentional and inherent to the software  "

## 2021-06-17 NOTE — TELEPHONE ENCOUNTER
Telephone Encounter by Brandy Nayak at 2020 10:24 AM     Author: Brandy Nayak Service: -- Author Type: --    Filed: 2020 11:47 AM Encounter Date: 2020 Status: Signed    : Brandy Nayak       I called the pharmacy and spoke with Bhargav Vasquez. She will call the call center as I received another request that says duplicate request and the approval dates show that it is .

## 2021-06-17 NOTE — TELEPHONE ENCOUNTER
Telephone Encounter by Veronica Marshall at 10/30/2020  9:38 AM     Author: Veronica Marshall Service: -- Author Type: --    Filed: 10/30/2020  9:39 AM Encounter Date: 10/30/2020 Status: Signed    : Veronica Marshall       Previously approved, good until 6/25/2021. Please see previous approval encounter.      Pharmacy was called with the PA number and they have a paid claim

## 2021-06-17 NOTE — PROGRESS NOTES
PAIN CLINIC FOLLOW UP PROGRESS NOTE    CC:  Chief Complaint   Patient presents with     Ankle Pain       HPI  Jaime L Franklin-Behrends is a 52 y.o. female who presents for evaluation   Chief Complaint   Patient presents with     Ankle Pain      that is causing continued pain. Since the last visit the patient denies any trips to the urgent care or ED specifically for their pain. The patient denies any new medications, diagnoses since the last visit. Specific questions indicated the patient wanted addressed today include: here to follow up on the appointment for her chronic pain and provide an update in regards to her left ankle. 1st evaluation at [facility] (Dr. Overton) and now second appointment with [facility] orthopedics. (Dr. Ramos)       Major issues:  1. Chronic pain syndrome    2. Tear of deltoid ligament of ankle, left, sequela    3. Chronic pain of left ankle        Today the pain is located in their left ankle and is described as sharp stabbing and dull achy  when it is aggravated it lasts for constant, and is rated at a 5-6 on a scale of 1-10  Associated symptoms: Denies any loss of bladder control, fevers/chills, unintentional weight loss, weakness, numbness or pain that interferes with sleep.   Aggravating factors include: any weight bearing   Alleviating factors: non-weight bearing   Adverse effects of medications: NONE   Functional symptoms: F6- affect activities constantly   Current subjective treatment efficacy: Fair      Previous Medical History  History   Alcohol Use No     History   Drug Use     Yes     Special: Marijuana     History   Smoking Status     Current Every Day Smoker     Packs/day: 0.50   Smokeless Tobacco     Never Used       Pertinent Pain Medications/interventions:  She currently takes vicodin 3-4 per day as a short acting and morphine 30 mg BID     Review of Systems:  12 point systems were reviewed with pt as documented on pt health form and the patient denies any new diagnosis or  "changes in 12 point system review since the last visit.     Physical Exam  Vitals:    04/23/18 1005   BP: (!) 140/92   Patient Site: Left Arm   Patient Position: Sitting   Cuff Size: Adult Regular   Pulse: (!) 101   Resp: 18   Weight: 208 lb (94.3 kg)   Height: 5' 7.75\" (1.721 m)     General- patient is alert and oriented, in NAD, well-groomed, well-nourished  Psych- Judgment and insight normal, AOx4, recent and remote memory normal, mood and affect normal  Eyes- pupils are equal and reactive, conjunctiva is clear bilaterally, no ptosis is noted.   Respiratory- breathing is non-labored  Cardiovascular- extremities warm and well perfused, no peripheral edema or varicosities.  Musculoskeletal- gait is normal, extremities with no joint swelling, erythema, or warmth.  Neuro- normal strength, no gait abnormalities, normal sensation to pain, temperature, light touch.  Integumentary- no rashes, dermatitis or discolorations noted throughout, no open wounds noted.    Medications    Current Outpatient Prescriptions:      acetaminophen (TYLENOL) 325 MG tablet, Take 2 tablets (650 mg total) by mouth daily as needed., Disp: , Rfl: 0     amLODIPine (NORVASC) 10 MG tablet, TK 1 T PO  ONCE D, Disp: , Rfl: 0     aspirin 325 MG EC tablet, Take 1 tablet (325 mg total) by mouth daily., Disp: 30 tablet, Rfl: 0     buPROPion (WELLBUTRIN XL) 300 MG 24 hr tablet, Take 300 mg by mouth daily., Disp: , Rfl:      CHANTIX STARTING MONTH BOX 0.5 mg (11)- 1 mg (42) tablet, , Disp: , Rfl: 0     ferrous gluconate (FERGON) 324 MG tablet, Take 324 mg by mouth 2 (two) times a day., Disp: , Rfl:      PARoxetine (PAXIL) 20 MG tablet, TK 1 T PO  QAM., Disp: , Rfl: 0     senna-docusate (SENNOSIDES-DOCUSATE SODIUM) 8.6-50 mg tablet, Take 1 tablet by mouth 3 (three) times a day as needed for constipation., Disp: , Rfl:      [START ON 4/25/2018] HYDROcodone-acetaminophen 5-325 mg per tablet, Take 1-2 tablets by mouth every 4 (four) hours as needed for pain " (max of 8 per day)., Disp: 112 tablet, Rfl: 0     [START ON 5/9/2018] HYDROcodone-acetaminophen 5-325 mg per tablet, Take 1-2 tablets by mouth every 4 (four) hours as needed for pain (max 8 per day)., Disp: 112 tablet, Rfl: 0     [START ON 4/25/2018] morphine (MS CONTIN) 30 MG 12 hr tablet, Take 1 tablet (30 mg total) by mouth every 12 (twelve) hours., Disp: 60 tablet, Rfl: 0    Lab:  Last UDS on 4/13/2018 was positive for the currently prescribed narcotics. Please see the scanned document from the outside lab. This was reviewed with the patient.      Imaging:  No new imaging.      Recent   Dated 4/23/2018 was reviewed with the patient today.   Paper copy reviewed    Assessment:   Govind SantosCheyanneBehrends is a 52 y.o. female seen in clinic today for   Chief Complaint   Patient presents with     Ankle Pain     Patient follows up today to discuss her ongoing pain as well as her recent 2nd opinion in regards to her ankle and the non-healing union that is present reportedly. Patient does have hardware internally and a walking boot with a scooter. Patient states she is to get a CT scan of her foot by a different orthopedics provider. Patient states that increase in pain medication is helping and she denies any side effects aside from her general constipation that she takes senna for.     At this time will have her follow up in 4 weeks and refill her pain medications today.      They are here for follow up and continued medical management of their pain. Today we reviewed the lab work of the UDT test and the results are expected because of the prescribed narcotics found in the urine drug screen.     I have also reviewed the information obtained from the last visit encounter after the FERNANDA was signed and have asked any pertintent questions needed from other healthcare providers/family/patient to continue care and formulate a treatment plan.     Patients current MME is 110    Patient to call clinic for next month's  prescription 5 days in advance. Based on the patients response to their previous narcotic therapy and quality of life, which includes their participation in ADLs, I recommend that the narcotics therapy continue, however the changes listed below will be incorporated into their plan of care.     Patient set goals to   1. Patient is here to follow up her pain treatment plan.     Plan:   Interventions-     Follow up in 4 weeks to evaluate the effectiveness of the treatment interventions ordered today. Next fill is due 5/23/2018    Behavioral Health with [facility] for stress, pain and anxiety.     Blood pressure medication was recently increased will defer to PCP for this.     Refills sent to the pharmacy today this should last you until 5/23/2018    Prescription Drug Management will be continued by the [facility] center  A narcotic contract was signed by the patient and  Patient is unable to get narcotics from other providers. They will be subject to random UAs.      Orders placed today  Medications that were ordered today   Requested Prescriptions     Signed Prescriptions Disp Refills     HYDROcodone-acetaminophen 5-325 mg per tablet 112 tablet 0     Sig: Take 1-2 tablets by mouth every 4 (four) hours as needed for pain (max of 8 per day).     morphine (MS CONTIN) 30 MG 12 hr tablet 60 tablet 0     Sig: Take 1 tablet (30 mg total) by mouth every 12 (twelve) hours.     HYDROcodone-acetaminophen 5-325 mg per tablet 112 tablet 0     Sig: Take 1-2 tablets by mouth every 4 (four) hours as needed for pain (max 8 per day).     No orders of the defined types were placed in this encounter.      UDT/SWAB:  Patient required a random Urine Drug Testing, due to the need to comply with [place] Model Policy Guidelines and CDC Guideline for the use of any controlled substances. This is to ensure that patient is compliant with treatment, and monitor for risks such as diversion, abuse, or any other aberrant behaviors. Patient is  either being considered for or taking a controlled substance. Unexpected findings will be discussed and treatment decision may be adjusted. Testing is being implemented across the board randomly w/o bias related to age, race, gender, socioeconomic status or Mandaen affiliation.    The patient understand todays plan and has their questions answered in regards to expectations and current treatment plan.     SAFETY REMINDERS  No alcohol while taking controlled substances. Alcohol is not an illegal substance, it is unsafe to use in combination. It is a build up of substances in the body that can be extremely hazardous and may cause respirations to slow to a dangerous rate resulting in hospitalization, brain damage, or death.    Opioid medications have been associated with sharp rise in unintentional overdose and death.  Overdose is a condition characterized by the consumption in excess of a particular drug causing adverse effects. This can happen b/c you are sick, accidentally or intentionally took an extra dose, are on multiple medication that can interact. Someone took your medication and they are not use to the medication.  Symptoms of overdose include:   !breathing slow and shallow, erratic or not at all  !pinpoint pupils, hallucinations  !confusion  !muscle jerks, slack muscles   !extreme sleepiness or loss of alertness   !awake but not able to talk   !face pale or clammy, vomiting, for lighter skinned people, the skin tone turns bluish purple, for darker skinned people, it turns grayish or ashen   If in a situation where overdose is a concern engage the emergency response system (dial 911).    In one study it was noted that 80% of unintentional overdoses occurred in people who were taking a combination of opioids and benzodiazepines.    Do not sell, loan, borrow or share your opioid medication with anyone. Deaths have occurred as a result of this practice. It is illegal and patients are being prosecuted.      Prevent unexpected access/loss of medication: Keep medication locked. Only carry what you need with you.    Celeste Perales, [facility]  [address]  [place] [address]  Ph: [phone]  Fax: [phone]

## 2021-06-17 NOTE — TELEPHONE ENCOUNTER
Telephone Encounter by Brandy Nayak at 6/15/2020  1:43 PM     Author: Brandy Nayak Service: -- Author Type: --    Filed: 6/15/2020  1:53 PM Encounter Date: 6/5/2020 Status: Signed    : Brandy Nayak       Response received from insurance: The pharmacy approved is not the pharmacy that the patient was possibly trying to get it refilled per telephone encounter.  This approval information was verified by [employer] by myself and the pharmacy.  I received this message and I called [employer] store [phone] and they informed me that it is closed out on their end and they see that the [employer] #[id number] was working on a PA. I called the Call Center and spoke with a rep that a PA is not longer valid and the medication should process as they cover a quantity of 204.00. The Pharmacy in [place] #[id number] is stating that it is for the max quantity per day rejection they are getting. I will have submit another request based on quantity per day.

## 2021-06-17 NOTE — TELEPHONE ENCOUNTER
Telephone Encounter by Veronica Marshall at 7/16/2020  4:21 PM     Author: Veronica Marshall Service: -- Author Type: --    Filed: 7/16/2020  4:23 PM Encounter Date: 7/16/2020 Status: Signed    : Veronica Marshall       Previously approved, good until 6/25/2021. Please see previous approval encounter.     Pharmacy was called with the PA number and they have a paid claim

## 2021-06-17 NOTE — PATIENT INSTRUCTIONS - HE
PLAN:    Urine drug test and controlled substance agreement today.    Sign release information and Dr. Ramirez to speak to your methadone counselor.    Referral to [facility] orthopedics for your feet    Hydrocodone milligrams every 6 hours as needed, #30.    When you see your primary care provider next week may discuss having her take over your lamotrigine gabapentin and Cymbalta.    Continue the medical cannabis program.    Follow-up with Dr. Ramirez in 2 months

## 2021-06-17 NOTE — TELEPHONE ENCOUNTER
"Patient calls, requesting a refill of her \"pain med\"  [facility] clinician also calls to report that on the most recent UDS obtained by that clinic, there was no soma present, however they do not test for soma.  Going forward [facility] reports that they will be testing for soma with any UDS done by their clinic.    Hydrocodone 5/325 mg was last ordered on 5/7/21, #30 tabs.    Please review this note and refill if appropriate  This has not been cued at this time.      "

## 2021-06-17 NOTE — TELEPHONE ENCOUNTER
Telephone Encounter by Brandy Nayak at 6/18/2020 11:27 AM     Author: Brandy Nayak Service: -- Author Type: --    Filed: 6/18/2020 11:36 AM Encounter Date: 6/5/2020 Status: Signed    : Brandy Nayak       UPDATE FROM [place] MEDICAID as of 06/17/2020.

## 2021-06-17 NOTE — TELEPHONE ENCOUNTER
Telephone Encounter by Veronica Marshall at 3/16/2021 10:16 AM     Author: Veronica Marshall Service: -- Author Type: --    Filed: 3/16/2021 10:18 AM Encounter Date: 3/12/2021 Status: Addendum    : Veronica Marshall    Related Notes: Original Note by Veronica Marshall filed at 3/16/2021 10:17 AM       Previously approved, good until 5/26/2021. Please see previous approval encounter.      Pharmacy was called with the PA number and they have a paid claim

## 2021-06-17 NOTE — TELEPHONE ENCOUNTER
Telephone Encounter by Vernoica Marshall at 9/22/2020 11:49 AM     Author: Veronica Marshall Service: -- Author Type: --    Filed: 9/22/2020 11:52 AM Encounter Date: 9/22/2020 Status: Signed    : Veronica Marshall       Previously approved, good until 6/25/2021. Please see previous approval encounter.      Pharmacy was called with the PA number and they have a paid claim

## 2021-06-17 NOTE — TELEPHONE ENCOUNTER
Telephone Encounter by Brandy Nayak at 6/19/2020  2:21 PM     Author: Brandy Nayak Service: -- Author Type: --    Filed: 6/19/2020  2:24 PM Encounter Date: 6/5/2020 Status: Signed    : Brandy Nayak APPROVED:    Approval start date: 06/01/2020  Approval end date:  05/26/2021  [facility] PA: [id number]  NDC: [id number]    Pharmacy has been notified of approval and will contact patient when medication is ready for pickup.
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
## 2021-06-18 NOTE — ANESTHESIA PREPROCEDURE EVALUATION
Anesthesia Evaluation      Patient summary reviewed   No history of anesthetic complications     Airway   Mallampati: II  Neck ROM: full   Pulmonary - negative ROS and normal exam                          Cardiovascular - normal exam  (+) hypertension, ,      Neuro/Psych - negative ROS     Endo/Other - negative ROS      GI/Hepatic/Renal    (+) GERD,   chronic renal disease,           Dental - normal exam                        Anesthesia Plan  Planned anesthetic: general endotracheal and peripheral nerve block    ASA 2     Anesthetic plan and risks discussed with: patient    Post-op plan: routine recovery

## 2021-06-18 NOTE — ANESTHESIA CARE TRANSFER NOTE
Last vitals:   Vitals:    06/22/18 1648   BP: (!) 115/96   Pulse: 87   Resp: 16   Temp: 36.5  C (97.7  F)   SpO2: 97%     Patient's level of consciousness is awake and drowsy  Spontaneous respirations: yes  Maintains airway independently: yes  Dentition unchanged: yes  Oropharynx: oropharynx clear of all foreign objects    QCDR Measures:  ASA# 20 - Surgical Safety Checklist: WHO surgical safety checklist completed prior to induction  PQRS# 430 - Adult PONV Prevention: 4558F - Pt received => 2 anti-emetic agents (different classes) preop & intraop  ASA# 8 - Peds PONV Prevention: NA - Not pediatric patient, not GA or 2 or more risk factors NOT present  PQRS# 424 - Vania-op Temp Management: 4559F - At least one body temp DOCUMENTED => 35.5C or 95.9F within required timeframe  PQRS# 426 - PACU Transfer Protocol: - Transfer of care checklist used  ASA# 14 - Acute Post-op Pain: ASA14B - Patient did NOT experience pain >= 7 out of 10

## 2021-06-18 NOTE — ANESTHESIA POSTPROCEDURE EVALUATION
Patient: Jaime L Franklin-Behrends  LEFT ANKLE REALIGNMENT FUSION WITH BONE GRAFTING  Anesthesia type: general    Patient location: PACU  Last vitals:   Vitals:    06/22/18 1829   BP: 149/90   Pulse: 90   Resp: 16   Temp:    SpO2: 98%     Post vital signs: stable  Level of consciousness: awake and responds to simple questions  Post-anesthesia pain: pain controlled  Post-anesthesia nausea and vomiting: no  Pulmonary: unassisted, return to baseline  Cardiovascular: stable and blood pressure at baseline  Hydration: adequate  Anesthetic events: no    QCDR Measures:  ASA# 11 - Vania-op Cardiac Arrest: ASA11B - Patient did NOT experience unanticipated cardiac arrest  ASA# 12 - Vania-op Mortality Rate: ASA12B - Patient did NOT die  ASA# 13 - PACU Re-Intubation Rate: ASA13B - Patient did NOT require a new airway mgmt  ASA# 10 - Composite Anes Safety: ASA10A - No serious adverse event    Additional Notes:

## 2021-06-18 NOTE — PROGRESS NOTES
PAIN CLINIC FOLLOW UP PROGRESS NOTE    CC:  Chief Complaint   Patient presents with     Ankle Pain     Left       HPI  Jaime L Franklin-Behrends is a 53 y.o. female who presents for evaluation   Chief Complaint   Patient presents with     Ankle Pain     Left    that is causing continued pain. Since the last visit the patient denies any trips to the urgent care or ED specifically for their pain. The patient denies any new medications, diagnoses since the last visit. Specific questions indicated the patient wanted addressed today include: today to follow up on her chronic pain medication and management.     Major issues:  1. Chronic pain syndrome      Today the pain is located in their right foot and is described as throbbing when it is aggravated it lasts for constant, and is rated at a 9 on a scale of 1-10  Associated symptoms: Denies any loss of bladder control, fevers/chills, unintentional weight loss, weakness, numbness or pain that interferes with sleep.   Aggravating factors include: recent surgery 5 days post operative.   Alleviating factors: dilaudid was increased to 6 mg every 3 hours. 12-14 tabs   Adverse effects of medications: NONE   Functional symptoms: F8 affects her ability to socialize, perform ADLs as well as sleep at times  Current subjective treatment efficacy: Poor      Previous Medical History  History   Alcohol Use     Yes     Comment: socially none per pt     History   Drug Use     Yes     Special: Marijuana     History   Smoking Status     Current Every Day Smoker     Packs/day: 0.50     Years: 25.00   Smokeless Tobacco     Never Used     Comment: 3 cigs/d still trying to quit       Pertinent Pain Medications/interventions:  She currently takes dilaudid 6 mg q 3 hours for a total of 12-14 tabs per day. Patient is currently taking 144-168 mme this is an increase from 45 mme per day pre-operatively     Review of Systems:  12 point systems were reviewed with pt as documented on pt health form and  "the patient denies any new diagnosis or changes in 12 point system review since the last visit.     Physical Exam  Vitals:    06/27/18 0859   BP: 131/78   Patient Site: Left Arm   Patient Position: Sitting   Pulse: (!) 109   Resp: 16   Weight: 206 lb (93.4 kg)   Height: 5' 7\" (1.702 m)     General- patient is alert and oriented, in NAD, well-groomed, well-nourished  Psych- Judgment and insight normal, AOx4, recent and remote memory normal, mood and affect normal  Eyes- pupils are equal and reactive, conjunctiva is clear bilaterally, no ptosis is noted.   Respiratory- breathing is non-labored  Cardiovascular- extremities warm and well perfused, no peripheral edema or varicosities.  Musculoskeletal- gait is normal, extremities with no joint swelling, erythema, or warmth.  Patient has a large walking cast that has been placed since last visit due to surgery-RLE  Neuro- normal strength, no gait abnormalities, normal sensation to pain, temperature, light touch.  Integumentary- no rashes, dermatitis or discolorations noted throughout, no open wounds noted.    Medications    Current Outpatient Prescriptions:      acetaminophen (TYLENOL) 325 MG tablet, Take 2 tablets (650 mg total) by mouth daily as needed., Disp: , Rfl: 0     amLODIPine (NORVASC) 10 MG tablet, TK 1 T PO  q day, Disp: , Rfl: 0     aspirin 325 MG tablet, Take 1 tablet (325 mg total) by mouth 2 (two) times a day with meals., Disp: , Rfl: 0     buPROPion (WELLBUTRIN XL) 300 MG 24 hr tablet, Take 300 mg by mouth daily., Disp: , Rfl:      docusate sodium (COLACE) 100 MG capsule, Take 1 capsule (100 mg total) by mouth 2 (two) times a day., Disp: , Rfl: 0     gabapentin (NEURONTIN) 300 MG capsule, Take 1 capsule (300 mg total) by mouth 3 (three) times a day., Disp: 32 capsule, Rfl: 0     HYDROmorphone (DILAUDID) 4 MG tablet, Take 1-1.5 tablets (4-6 mg total) by mouth every 3 (three) hours as needed., Disp: 30 tablet, Rfl: 0     hydrOXYzine pamoate (VISTARIL) 25 MG " capsule, Take 1 capsule (25 mg total) by mouth every 4 (four) hours as needed for anxiety., Disp: 32 capsule, Rfl: 0     ibuprofen (ADVIL,MOTRIN) 200 MG tablet, Take 200 mg by mouth 4 (four) times a day as needed for pain., Disp: , Rfl:      PARoxetine (PAXIL) 40 MG tablet, Take 40 mg by mouth every morning., Disp: , Rfl:     Lab:  Last UDS on 3/22/2018 had expected results. Any abnormal results have been discussed with the patient and may change the plan of care. Please see the scanned document from the outside lab under the media tab. This was reviewed with the patient.      Imaging:  No new imaging.      Recent   Dated 6/27/2018 was reviewed with the patient today.   Paper copy reviewed    Assessment:   Govind SantosCheyanneBehrends is a 53 y.o. female seen in clinic today for   Chief Complaint   Patient presents with     Ankle Pain     Left     Patient follows up in the pain Center today after a recent surgery for a repeat foot surgery from her podiatrist.  Patient states that her pain was so out of control after the surgery they kept her for 2 days.  Patient states that her surgeon has given her an increase of the Dilaudid tablets as that seem to be the only thing that helped her.  Patient does report she is taking 14-16 tablets of Dilaudid per day which is approximately 6 mg of Dilaudid every 3 hours as needed patient is reporting she is using all of it.  At this time this increases her MME up to 144-168 based on the amount of tablets she is taking this is a very large increase from her preoperative dose of 45.     As the patient is still in the acute recovery.  From her recent surgery of only 5 days ago I will continue to encourage the patient to follow-up with her surgeon for continued pain management at this point I would not add anything to this regimen I would only decrease, as this is a unsafe level at this time and the patient is reporting inadequate pain relief.  I did discuss with the patient that the  use of Narcan as she does have this prescription at home use due to her previous use of opioids.  She also reports she is not on a muscle relaxer but she is taking Vistaril to help with the pain as well.  As well as her gabapentin    I had the patient follow-up with me in 3 weeks patient does report to me that the surgeon states he would cover her postoperative pain for 1 month after her surgery I did let her know that we would likely be decreasing when she represents patient agrees this plan I also educated her on the amount of increase that she is currently doing and how dangerous it is patient has no further questions    They are here for follow up and continued medical management of their pain. Today we reviewed the lab work of the UDT test and the results are expected because of the prescribed narcotics found in the urine drug screen.     I have also reviewed the information obtained from the last visit encounter after the FERNANDA was signed and have asked any pertintent questions needed from other healthcare providers/family/patient to continue care and formulate a treatment plan.     Patients current MME is 144-168 increased since last visit due to post operative scripts prior to surgery MME was 45.  Patient to call clinic for next month's prescription 5 days in advance. Based on the patients response to their previous narcotic therapy and quality of life, which includes their participation in ADLs, I recommend that the narcotics therapy continue, however the changes listed below will be incorporated into their plan of care.     Patient set goals to   1. continue pain management     Plan:   Interventions-    Due to the significant increase in the MME from 45 to 144-168 it is too dangerous to increase your opioids today. Continue to use ice packs, tylenol and vistaril to help you through the acute postoperative period.     Follow up in 3 weeks for a re-evalaution- the surgeon is expected to continue to manage your  acute post operative pain, as they are.     Ok to use the CBD oil to complement the use of opioids.    Narcan is already prescribed and you have it at home.     Prescription Drug Management will be continued by the [facility]  A narcotic contract was signed by the patient and  Patient is unable to get narcotics from other providers. They will be subject to random UAs.        UDT/SWAB:  Patient required a random Urine Drug Testing, due to the need to comply with [facility] Model Policy Guidelines and CDC Guideline for the use of any controlled substances. This is to ensure that patient is compliant with treatment, and monitor for risks such as diversion, abuse, or any other aberrant behaviors. Patient is either being considered for or taking a controlled substance. Unexpected findings will be discussed and treatment decision may be adjusted. Testing is being implemented across the board randomly w/o bias related to age, race, gender, socioeconomic status or Mu-ism affiliation.    The patient understand todays plan and has their questions answered in regards to expectations and current treatment plan.     SAFETY REMINDERS  No alcohol while taking controlled substances. Alcohol is not an illegal substance, it is unsafe to use in combination. It is a build up of substances in the body that can be extremely hazardous and may cause respirations to slow to a dangerous rate resulting in hospitalization, brain damage, or death.    Opioid medications have been associated with sharp rise in unintentional overdose and death.  Overdose is a condition characterized by the consumption in excess of a particular drug causing adverse effects. This can happen b/c you are sick, accidentally or intentionally took an extra dose, are on multiple medication that can interact. Someone took your medication and they are not use to the medication.  Symptoms of overdose include:   !breathing slow and shallow, erratic or not at  all  !pinpoint pupils, hallucinations  !confusion  !muscle jerks, slack muscles   !extreme sleepiness or loss of alertness   !awake but not able to talk   !face pale or clammy, vomiting, for lighter skinned people, the skin tone turns bluish purple, for darker skinned people, it turns grayish or ashen   If in a situation where overdose is a concern engage the emergency response system (dial 911).    In one study it was noted that 80% of unintentional overdoses occurred in people who were taking a combination of opioids and benzodiazepines.    Do not sell, loan, borrow or share your opioid medication with anyone. Deaths have occurred as a result of this practice. It is illegal and patients are being prosecuted.     Prevent unexpected access/loss of medication: Keep medication locked. Only carry what you need with you.    Celeste Perales, [facility]  [address]  [place] [address]  Ph: [phone]  Fax: [phone]

## 2021-06-18 NOTE — ANESTHESIA PROCEDURE NOTES
Peripheral Block    Patient location during procedure: pre-op  Start time: 6/22/2018 12:35 PM  End time: 6/22/2018 12:40 PM  post-op analgesia per surgeon order as noted in medical record  Staffing:  Performing  Anesthesiologist: DANNIE VILLALPANDO  Preanesthetic Checklist  Completed: patient identified, site marked, risks, benefits, and alternatives discussed, timeout performed, consent obtained, at patient's request, airway assessed, oxygen available, suction available, emergency drugs available and hand hygiene performed  Peripheral Block  Block type: sciatic, popliteal  Prep: ChloraPrep  Patient position: supine  Patient monitoring: cardiac monitor, continuous pulse oximetry, heart rate and blood pressure  Laterality: left  Injection technique: ultrasound guided    Ultrasound used to visualize needle placement in proximity to nerve being blocked: yes   Permanent ultrasound image captured for medical record  Sterile gel and probe cover used for ultrasound.    Needle  Needle type: Stimuplex   Needle gauge: 21 G  Needle length: 4 in  no peripheral nerve catheter placed  Assessment  Injection assessment: no difficulty with injection, negative aspiration for heme, no paresthesia on injection and incremental injection

## 2021-06-18 NOTE — ANESTHESIA PROCEDURE NOTES
Peripheral Block    Patient location during procedure: pre-op  Start time: 6/22/2018 12:40 PM  End time: 6/22/2018 12:43 PM  post-op analgesia per surgeon order as noted in medical record  Staffing:  Performing  Anesthesiologist: DANNIE VILLALPANDO  Preanesthetic Checklist  Completed: patient identified, site marked, risks, benefits, and alternatives discussed, timeout performed, consent obtained, at patient's request, airway assessed, oxygen available, suction available, emergency drugs available and hand hygiene performed  Peripheral Block  Block type: saphenous  Prep: ChloraPrep  Patient position: supine  Patient monitoring: continuous pulse oximetry, heart rate, blood pressure and cardiac monitor  Laterality: left  Injection technique: ultrasound guided    Ultrasound used to visualize needle placement in proximity to nerve being blocked: yes     Sterile gel and probe cover used for ultrasound.    Needle  Needle type: Stimuplex   Needle gauge: 22 G  Needle length: 4 in    Assessment  Injection assessment: negative aspiration for heme, no paresthesia on injection, incremental injection and no difficulty with injection

## 2021-06-18 NOTE — PROGRESS NOTES
PAIN CLINIC FOLLOW UP PROGRESS NOTE    CC:  Chief Complaint   Patient presents with     Ankle Pain       HPI  Jaime L Franklin-Behrends is a 52 y.o. female who presents for evaluation   Chief Complaint   Patient presents with     Ankle Pain    that is causing continued pain. Since the last visit the patient denies any trips to the urgent care or ED specifically for their pain. The patient denies any new medications, diagnoses since the last visit. Specific questions indicated the patient wanted addressed today include: to continue to work on her pain medication while awaiting for surgery      Major issues:  1. Chronic pain syndrome    2. Tear of deltoid ligament of ankle, left, sequela    3. Chronic pain of left ankle      Today the pain is located in their left lower extremity and is described as throbbing, shooting and stabbing when it is aggravated it lasts for constant, and is rated at a 9 on a scale of 1-10  Associated symptoms: Denies any loss of bladder control, fevers/chills, unintentional weight loss, weakness, numbness or pain that interferes with sleep.   Aggravating factors include: unknown, she is currently in a walking boot but feels that anything is aggravates her pain  Alleviating factors: nothing as of yet.   Adverse effects of medications: NONE   Functional symptoms:F8- affects her ability to function on a daily bases, perform ADLs and socialize   Current subjective treatment efficacy: Poor      Previous Medical History  History   Alcohol Use No     History   Drug Use     Yes     Special: Marijuana     History   Smoking Status     Current Every Day Smoker     Packs/day: 0.50   Smokeless Tobacco     Never Used       Pertinent Pain Medications/interventions:  She currently takes morphine er 30 mg BID, Norco 3-4 per day     Review of Systems:  12 point systems were reviewed with pt as documented on pt health form and the patient denies any new diagnosis or changes in 12 point system review since the  "last visit.     Physical Exam  Vitals:    05/21/18 0910   BP: 121/90   Patient Site: Left Arm   Patient Position: Sitting   Cuff Size: Adult Regular   Pulse: (!) 107   Resp: 16   Weight: 208 lb (94.3 kg)   Height: 5' 7.75\" (1.721 m)     General- patient is alert and oriented, in NAD, well-groomed, well-nourished  Psych- Judgment and insight normal, AOx4, recent and remote memory normal, mood and affect normal  Eyes- pupils are equal and reactive, conjunctiva is clear bilaterally, no ptosis is noted.   Respiratory- breathing is non-labored  Cardiovascular- extremities warm and well perfused, no peripheral edema or varicosities.  Musculoskeletal- gait is abnormal, extremities with no joint swelling, erythema, or warmth. Patient is a walking boot on the left   Neuro- normal strength, no gait abnormalities, normal sensation to pain, temperature, light touch.  Integumentary- no rashes, dermatitis or discolorations noted throughout, no open wounds noted.    Medications    Current Outpatient Prescriptions:      acetaminophen (TYLENOL) 325 MG tablet, Take 2 tablets (650 mg total) by mouth daily as needed., Disp: , Rfl: 0     amLODIPine (NORVASC) 10 MG tablet, TK 1 T PO  ONCE D, Disp: , Rfl: 0     aspirin 325 MG EC tablet, Take 1 tablet (325 mg total) by mouth daily., Disp: 30 tablet, Rfl: 0     buPROPion (WELLBUTRIN XL) 300 MG 24 hr tablet, Take 300 mg by mouth daily., Disp: , Rfl:      CHANTIX STARTING MONTH BOX 0.5 mg (11)- 1 mg (42) tablet, , Disp: , Rfl: 0     ferrous gluconate (FERGON) 324 MG tablet, Take 324 mg by mouth 2 (two) times a day., Disp: , Rfl:      HYDROcodone-acetaminophen 5-325 mg per tablet, Take 1-2 tablets by mouth every 4 (four) hours as needed for pain (max 8 per day)., Disp: 112 tablet, Rfl: 0     PARoxetine (PAXIL) 20 MG tablet, TK 1 T PO  QAM., Disp: , Rfl: 0     senna-docusate (SENNOSIDES-DOCUSATE SODIUM) 8.6-50 mg tablet, Take 1 tablet by mouth 3 (three) times a day as needed for constipation., " Disp: 90 tablet, Rfl: 2     gabapentin (NEURONTIN) 300 MG capsule, Take 1 capsule (300 mg total) by mouth at bedtime., Disp: 30 capsule, Rfl: 0     oxyCODONE (OXYCONTIN) 15 mg 12 hr tablet, Take 1 tablet (15 mg total) by mouth every 12 (twelve) hours for 14 days., Disp: 28 tablet, Rfl: 0     [START ON 2018] oxyCODONE (OXYCONTIN) 15 mg 12 hr tablet, Take 1 tablet (15 mg total) by mouth every 12 (twelve) hours for 14 days., Disp: 28 tablet, Rfl: 0     oxyCODONE (ROXICODONE) 10 mg immediate release tablet, Take 1 tablet (10 mg total) by mouth 3 (three) times a day for 14 days., Disp: 42 tablet, Rfl: 0     [START ON 2018] oxyCODONE (ROXICODONE) 10 mg immediate release tablet, Take 1 tablet (10 mg total) by mouth 3 (three) times a day as needed for pain., Disp: 42 tablet, Rfl: 0    Lab:  Last UDS on 2018 had expected results. Any abnormal results have been discussed with the patient and may change the plan of care. Please see the scanned document from the outside lab under the media tab. This was reviewed with the patient.      Imaging:  No new imaging.      Recent   Dated 2018 was reviewed with the patient today.   [employer] [place] Date: 18  Query Report Page#: 1  Patient Rx History Report  GERARD AGUSTIN  Search Criteria: Last Name 'GERARD' and First Name 'Govind' and  = ' and Request Period = ' to  ' - 3 out of 3 Recipients Selected.  Fill Date Product, Str, Form Qty Days Pt ID Prescriber Written RX# N/R* Pharm **MED+  ---------- -------------------------------- ------ ---- --------- ---------- ---------- ------------ ----- --------- ------  2018 HYDROCODONE-ACETAMIN 5-325 .00 14 [id number] 2018 [id number] N [id number] 40.0  2018 MORPHINE SULF ER 30 MG TABLET 60.00 30 [id number] 2018 [id number] N [id number] 60.0  2018 HYDROCODONE-ACETAMIN 5-325 .00 14 [id number] 2018 [id number] N [id number]  40.0  04/13/2018 HYDROCODONE-ACETAMIN 5-325 MG 96.00 12 [id number] 04/13/2018 [id number] N [id number] 40.0  04/13/2018 MORPHINE SULF ER 30 MG TABLET 24.00 12 [id number] 04/13/2018 [id number] N [id number] 60.0  04/09/2018 OXYCODONE HCL 5 MG TABLET 80.00 7 [id number] 04/09/2018 [id number] N [id number] 85.71  04/02/2018 OXYCODONE HCL 5 MG TABLET 80.00 7 [id number] 04/02/2018 [id number] N [id number] 85.71  03/26/2018 OXYCODONE HCL 5 MG TABLET 80.00 7 [id number] 03/26/2018 [id number] N [id number] 85.71  03/19/2018 OXYCODONE HCL 5 MG TABLET 80.00 7 [id number] 03/14/2018 [id number] N [id number] 85.71  03/12/2018 OXYCODONE HCL 5 MG TABLET 90.00 8 [id number] 03/12/2018 [id number] N [id number] 84.38  03/05/2018 OXYCODONE HCL 5 MG TABLET 80.00 8 [id number] 03/05/2018 [id number] N [id number] 75.0  02/26/2018 OXYCODONE HCL 5 MG TABLET 80.00 7 [id number] 02/26/2018 [id number] N [id number] 85.71  02/19/2018 OXYCODONE HCL 5 MG TABLET 90.00 8 [id number] 02/19/2018 [id number] N [id number] 84.38  02/12/2018 OXYCODONE HCL 5 MG TABLET 90.00 8 [id number] 02/09/2018 [id number] N [id number] 84.38  02/05/2018 OXYCODONE HCL 5 MG TABLET 90.00 8 [id number] 02/05/2018 [id number] N [id number] 84.38  01/29/2018 OXYCODONE HCL 5 MG TABLET 90.00 8 [id number] 01/26/2018 [id number] N [id number] 84.38  01/24/2018 OXYCODONE HCL 5 MG TABLET 60.00 5 [id number] 01/24/2018 [id number] N [id number] 90.0  01/17/2018 OXYCODONE HCL 5 MG TABLET 60.00 5 [id number] 01/17/2018 [id number] N [id number] 90.0  01/11/2018 OXYCODONE HCL 5 MG TABLET 60.00 5 [id number] 01/09/2018 [id number] N [id number] 90.0  01/04/2018 OXYCODONE HCL 5 MG TABLET 60.00 6 [id number] 05/08/2017 [id number] N [id number] 75.0  12/28/2017 OXYCODONE HCL 5 MG TABLET 60.00 5 [id number] 12/26/2017 [id number] N [id number] 90.0  12/21/2017 OXYCODONE HCL 5 MG TABLET 60.00 5 [id number] 12/21/2017 [id number] N [id number] 90.0  12/16/2017  OXYCODONE HCL 5 MG TABLET 60.00 5 [id number] 12/14/2017 [id number] N [id number] 90.0  12/11/2017 OXYCODONE HCL 5 MG TABLET 60.00 6 [id number] 12/11/2017 [id number] N [id number] 75.0  12/06/2017 OXYCODONE HCL 5 MG TABLET 60.00 6 [id number] 12/06/2017 [id number] N [id number] 75.0  12/01/2017 OXYCODONE HCL 5 MG TABLET 60.00 6 [id number] 11/29/2017 [id number] N [id number] 75.0  11/24/2017 OXYCODONE HCL 5 MG TABLET 90.00 8 [id number] 11/21/2017 [id number] N [id number] 84.38  11/15/2017 OXYCODONE HCL 5 MG TABLET 120.00 10 [id number] 11/14/2017 [id number] N [id number] 90.0  11/10/2017 OXYCODONE HCL 5 MG TABLET 60.00 6 [id number] 11/09/2017 [id number] N [id number] 75.0  11/05/2017 OXYCODONE HCL 5 MG TABLET 60.00 5 [id number] 11/05/2017 [id number] N [id number] 90.0  10/30/2017 OXYCODONE HCL 5 MG TABLET 65.00 6 [id number] 10/30/2017 [id number] N [id number] 81.25  10/23/2017 OXYCODONE HCL 5 MG TABLET 65.00 6 [id number] 10/18/2017 [id number] N [id number] 81.25  10/16/2017 OXYCODONE HCL 5 MG TABLET 65.00 6 [id number] 10/16/2017 [id number] N [id number] 81.25  10/09/2017 OXYCODONE HCL 5 MG TABLET 65.00 10 [id number] 10/09/2017 [id number] N [id number] 48.75  10/03/2017 OXYCODONE HCL 5 MG TABLET 60.00 5 [id number] 10/03/2017 [id number] N [id number] 90.0  09/27/2017 OXYCODONE HCL 5 MG TABLET 60.00 5 [id number] 09/26/2017 [id number] N [id number] 90.0  09/20/2017 OXYCODONE HCL 5 MG TABLET 60.00 5 [id number] 09/19/2017 [id number] N [id number] 90.0  09/13/2017 OXYCODONE HCL 5 MG TABLET 60.00 5 [id number] 09/13/2017 [id number] N [id number] 90.0  **Per CDC guidance, the conversion factors and associated daily morphine milligram equivalents for drugs prescribed as part of  medication-assisted treatment for opioid use disorder should not be used to benchmark against dosage thresholds meant for opioids  prescribed for pain.  Report Disclaimers:  The report provided above is based  upon the search criteria and the data provided by the dispensing entities. For more information  about any prescription, please contact the dispenser or the prescriber.  This report contains confidential information, including patient identifiers, and is not a public record. The information on this  report must be treated as protected health information and is to be disclosed to others only as authorized by applicable state  and Federal regulations.    Assessment:   Jaime L Franklin-Behrends is a 52 y.o. female seen in clinic today for   Chief Complaint   Patient presents with     Ankle Pain     Patient presents the office today to follow-up her current pain plan.  She is currently taking morphine extended release 30 mg twice daily as well as Norco 5/325 approximately 3-4 per day.  Patient states she has been overtaking because it has not been controlling her pain in the morphine only last for 6 hours or so.  We did discuss today that the patient is scheduled to have surgery June 19 she does have loose hardware noted in her ankle on the left she is always in a walking boot when she presents the office.  Patient did previously see Dr. Burk however she has a new podiatrist who will be performing the surgery.    Patient denies any history of osteogenesis issues and she knows that her vitamin D is currently within range.    Today we did discuss the problem with overtaking medications and it is not acceptable she is aware that this can break her contract she does however hope that if we reduce the prescription length to 14 days that this helps her regulate her use of the opioids.  Patient is also seen Sara for behavioral therapy and will follow up with Dr. Ramirez for anxiety per Ali's request.    Today I would transition her to OxyContin 15 mg long-acting twice daily as well as oxycodone 10 mg 3 times daily these are both 14 day prescriptions she is okay to fill on 5/21, 6/4 which should last her until 618.  Patient  understands that the surgeon will likely prescribe for her postoperatively as well in addition to her current regimen of chronic pain medications.  Patient also has a prescription for Narcan which she already has at home.     Patient knows to call for refill and she will see me in approximately 2 months or sooner if needed    Patients current MME is 90  Patient to call clinic for next month's prescription 5 days in advance. Based on the patients response to their previous narcotic therapy and quality of life, which includes their participation in ADLs, I recommend that the narcotics therapy continue, however the changes listed below will be incorporated into their plan of care.     Patient set goals to   1. 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     Plan:   Interventions-    Follow up in 2 months to evaluate the effectiveness of the treatment interventions ordered today.     Surgery on June 19th for the left ankle that has loose hardware and is non healing at this time.     Continue to see Netta as you are and can with your [facility] doctor appointments, she has requested that you see Dr. Ramirez for your anxiety.     Expect the surgeon to provide you with post operative pain medications in addition to what the pain clinic is prescribing for you .    Add Vitamin C Extended release, to your regimen     Today you report you have overtaken the morphine and the Norco because it is not covering your pain well. You understand that this is not acceptable.     However at this time it is understood that you are awaiting surgery and that you truly have an underlying issue, will discontinue the morphine and transition you to OxyContin ER and the oxycodone 10 mg 3x a day on 14 day prescriptions.  Ok to fill today 5/21/2018, 6/4/2018 and should last you until 6/18/2018.     Will trial the gabapentin at bedtime to help with your discomfort. You know to  update me when you can in regards to any side effects or benefits.     You will call before the surgery to request your next refill    Prescription Drug Management will be continued by the [facility]  A narcotic contract was signed by the patient and  Patient is unable to get narcotics from other providers. They will be subject to random UAs.      Orders placed today  Medications that were ordered today   Requested Prescriptions     Signed Prescriptions Disp Refills     oxyCODONE (OXYCONTIN) 15 mg 12 hr tablet 28 tablet 0     Sig: Take 1 tablet (15 mg total) by mouth every 12 (twelve) hours for 14 days.     oxyCODONE (ROXICODONE) 10 mg immediate release tablet 42 tablet 0     Sig: Take 1 tablet (10 mg total) by mouth 3 (three) times a day for 14 days.     oxyCODONE (OXYCONTIN) 15 mg 12 hr tablet 28 tablet 0     Sig: Take 1 tablet (15 mg total) by mouth every 12 (twelve) hours for 14 days.     oxyCODONE (ROXICODONE) 10 mg immediate release tablet 42 tablet 0     Sig: Take 1 tablet (10 mg total) by mouth 3 (three) times a day as needed for pain.     gabapentin (NEURONTIN) 300 MG capsule 30 capsule 0     Sig: Take 1 capsule (300 mg total) by mouth at bedtime.     senna-docusate (SENNOSIDES-DOCUSATE SODIUM) 8.6-50 mg tablet 90 tablet 2     Sig: Take 1 tablet by mouth 3 (three) times a day as needed for constipation.     No orders of the defined types were placed in this encounter.      UDT/SWAB:  Patient required a random Urine Drug Testing, due to the need to comply with [place] [employer] Policy Guidelines and CDC Guideline for the use of any controlled substances. This is to ensure that patient is compliant with treatment, and monitor for risks such as diversion, abuse, or any other aberrant behaviors. Patient is either being considered for or taking a controlled substance. Unexpected findings will be discussed and treatment decision may be adjusted. Testing is being implemented across the board randomly  w/o bias related to age, race, gender, socioeconomic status or Spiritism affiliation.    The patient understand todays plan and has their questions answered in regards to expectations and current treatment plan.     SAFETY REMINDERS  No alcohol while taking controlled substances. Alcohol is not an illegal substance, it is unsafe to use in combination. It is a build up of substances in the body that can be extremely hazardous and may cause respirations to slow to a dangerous rate resulting in hospitalization, brain damage, or death.    Opioid medications have been associated with sharp rise in unintentional overdose and death.  Overdose is a condition characterized by the consumption in excess of a particular drug causing adverse effects. This can happen b/c you are sick, accidentally or intentionally took an extra dose, are on multiple medication that can interact. Someone took your medication and they are not use to the medication.  Symptoms of overdose include:   !breathing slow and shallow, erratic or not at all  !pinpoint pupils, hallucinations  !confusion  !muscle jerks, slack muscles   !extreme sleepiness or loss of alertness   !awake but not able to talk   !face pale or clammy, vomiting, for lighter skinned people, the skin tone turns bluish purple, for darker skinned people, it turns grayish or ashen   If in a situation where overdose is a concern engage the emergency response system (dial 911).    In one study it was noted that 80% of unintentional overdoses occurred in people who were taking a combination of opioids and benzodiazepines.    Do not sell, loan, borrow or share your opioid medication with anyone. Deaths have occurred as a result of this practice. It is illegal and patients are being prosecuted.     Prevent unexpected access/loss of medication: Keep medication locked. Only carry what you need with you.    Celeste Perales, [facility]  [address]  [place] [address]  Ph: [phone]  Fax: [phone]

## 2021-06-18 NOTE — PROGRESS NOTES
PAIN CLINIC FOLLOW UP PROGRESS NOTE    CC:  Chief Complaint   Patient presents with     Ankle Pain       HPI  Jaime L Franklin-Behrends is a 52 y.o. female who presents for evaluation   Chief Complaint   Patient presents with     Ankle Pain    that is causing continued pain. Since the last visit the patient denies any trips to the urgent care or ED specifically for their pain. The patient denies any new medications, diagnoses since the last visit. Specific questions indicated the patient wanted addressed today include: follow up on chronic pain and her upcoming surgery, patient complains of left leg pain that increases by the end of the day, patient feels like the morphine was helping more then the oxycontin.     Major issues:  1. Chronic pain syndrome    2. Chronic pain of left ankle      Today the pain is located in their left lower leg and is described as burning and shooting, aching and throbbing when it is aggravated it lasts for constant increase with activity, and is rated at a 10 on a scale of 1-10  Associated symptoms: Denies any loss of bladder control, fevers/chills, unintentional weight loss, weakness, numbness or pain that interferes with sleep.   Aggravating factors include: increased activity   Alleviating factors: ice, rest and medicaitons  Adverse effects of medications: NONE   Functional symptoms:F8 affects her ability to sleep, perform ADLs and socialize on a daily bases  Current subjective treatment efficacy: Poor      Previous Medical History  History   Alcohol Use No     History   Drug Use     Yes     Special: Marijuana     History   Smoking Status     Current Every Day Smoker     Packs/day: 0.50   Smokeless Tobacco     Never Used       Pertinent Pain Medications/interventions:  She currently takes OxyContin 15 mg two times a day, oxycodone 10 mg 4 times a day, takes gabapentin 300 mg at bedtime    Patient has failed long acting morphine 30 mg two times a day with Norco 5/325  Failed OxyContin  "15 mg two times a day and oxycodone 10 mg four times a day     Review of Systems:  12 point systems were reviewed with pt as documented on pt health form and the patient denies any new diagnosis or changes in 12 point system review since the last visit.     Physical Exam  Vitals:    05/30/18 1449   BP: 123/90   Patient Site: Left Arm   Patient Position: Sitting   Cuff Size: Adult Regular   Pulse: (!) 106   Resp: 16   Weight: 208 lb (94.3 kg)   Height: 5' 7.75\" (1.721 m)     General- patient is alert and oriented, in NAD, well-groomed, well-nourished  Psych- Judgment and insight normal, AOx4, recent and remote memory normal, mood and affect normal  Eyes- pupils are equal and reactive, conjunctiva is clear bilaterally, no ptosis is noted.   Respiratory- breathing is non-labored  Cardiovascular- extremities warm and well perfused, no peripheral edema or varicosities.  Musculoskeletal- gait is normal, extremities with no joint swelling, erythema, or warmth.  Neuro- normal strength, no gait abnormalities, normal sensation to pain, temperature, light touch.  Integumentary- no rashes, dermatitis or discolorations noted throughout, no open wounds noted.    Medications    Current Outpatient Prescriptions:      acetaminophen (TYLENOL) 325 MG tablet, Take 2 tablets (650 mg total) by mouth daily as needed., Disp: , Rfl: 0     amLODIPine (NORVASC) 10 MG tablet, TK 1 T PO  ONCE D, Disp: , Rfl: 0     aspirin 325 MG EC tablet, Take 1 tablet (325 mg total) by mouth daily., Disp: 30 tablet, Rfl: 0     buPROPion (WELLBUTRIN XL) 300 MG 24 hr tablet, Take 300 mg by mouth daily., Disp: , Rfl:      CHANTIX STARTING MONTH BOX 0.5 mg (11)- 1 mg (42) tablet, , Disp: , Rfl: 0     ferrous gluconate (FERGON) 324 MG tablet, Take 324 mg by mouth 2 (two) times a day., Disp: , Rfl:      gabapentin (NEURONTIN) 300 MG capsule, Take 1 capsule (300 mg total) by mouth 3 (three) times a day., Disp: 90 capsule, Rfl: 0     PARoxetine (PAXIL) 20 MG tablet, " TK 1 T FLAKO  QAM., Disp: , Rfl: 0     senna-docusate (SENNOSIDES-DOCUSATE SODIUM) 8.6-50 mg tablet, Take 1 tablet by mouth 3 (three) times a day as needed for constipation., Disp: 90 tablet, Rfl: 2     HYDROmorphone (DILAUDID) 4 MG tablet, Take 1 tablet (4 mg total) by mouth every 4 (four) hours as needed for pain (max of 6 per day)., Disp: 42 tablet, Rfl: 0     [START ON 2018] HYDROmorphone (DILAUDID) 4 MG tablet, Take 1 tablet (4 mg total) by mouth every 4 (four) hours as needed for pain (max of 6 per day)., Disp: 42 tablet, Rfl: 0    Lab:  Last UDS on 2018 had expected results. Any abnormal results have been discussed with the patient and may change the plan of care. Please see the scanned document from the outside lab under the media tab. This was reviewed with the patient.      Imaging:  No new imaging.      Recent   Dated 2018 was reviewed with the patient today.   [employer] [place] Date: 18  Query Report Page#: 1  Patient Rx History Report  GERARD ZIMMERMANIME  Search Criteria: Last Name 'GERARD' and First Name 'Vamsi' and  = ' and Request Period = '17' to  '18' - 3 out of 3 Recipients Selected.  Fill Date Product, Str, Form Qty Days Pt ID Prescriber Written RX# N/R* Pharm **MED+  ---------- -------------------------------- ------ ---- --------- ---------- ---------- ------------ ----- --------- ------  2018 OXYCONTIN 15 MG TABLET 28.00 14 [id number] 2018 [id number] N [id number] 45.0  2018 OXYCODONE HCL 10 MG TABLET 42.00 14 [id number] 2018 [id number] N [id number] 45.0  2018 GABAPENTIN 300 MG CAPSULE 30.00 30 [id number] 2018 [id number] N [id number] 00.0  2018 HYDROCODONE-ACETAMIN 5-325 .00 14 [id number] 2018 [id number] N [id number] 40.0  2018 MORPHINE SULF ER 30 MG TABLET 60.00 30 [id number] 2018 [id number] N [id number] 60.0  2018 HYDROCODONE-ACETAMIN 5-325 .00 14  [id number] 04/23/2018 [id number] N [id number] 40.0  04/13/2018 HYDROCODONE-ACETAMIN 5-325 MG 96.00 12 [id number] 04/13/2018 [id number] N [id number] 40.0  04/13/2018 MORPHINE SULF ER 30 MG TABLET 24.00 12 [id number] 04/13/2018 [id number] N [id number] 60.0  04/09/2018 OXYCODONE HCL 5 MG TABLET 80.00 7 [id number] 04/09/2018 [id number] N [id number] 85.71  04/02/2018 OXYCODONE HCL 5 MG TABLET 80.00 7 [id number] 04/02/2018 [id number] N [id number] 85.71  03/26/2018 OXYCODONE HCL 5 MG TABLET 80.00 7 [id number] 03/26/2018 [id number] N [id number] 85.71  03/19/2018 OXYCODONE HCL 5 MG TABLET 80.00 7 [id number] 03/14/2018 [id number] N [id number] 85.71  03/12/2018 OXYCODONE HCL 5 MG TABLET 90.00 8 [id number] 03/12/2018 [id number] N [id number]  03/05/2018 OXYCODONE HCL 5 MG TABLET 80.00 8 [id number] 03/05/2018 [id number] N [id number] 75.0  02/26/2018 OXYCODONE HCL 5 MG TABLET 80.00 7 [id number] 02/26/2018 [id number] N [id number] 85.71  02/19/2018 OXYCODONE HCL 5 MG TABLET 90.00 8 [id number] 02/19/2018 [id number] N [id number] 84.38  02/12/2018 OXYCODONE HCL 5 MG TABLET 90.00 8 [id number] 02/09/2018 [id number] N [id number] 84.38  02/05/2018 OXYCODONE HCL 5 MG TABLET 90.00 8 [id number] 02/05/2018 [id number] N [id number] 84.38  01/29/2018 OXYCODONE HCL 5 MG TABLET 90.00 8 [id number] 01/26/2018 [id number] N [id number] 84.38  01/24/2018 OXYCODONE HCL 5 MG TABLET 60.00 5 [id number] 01/24/2018 [id number] N [id number] 90.0  01/17/2018 OXYCODONE HCL 5 MG TABLET 60.00 5 [id number] 01/17/2018 [id number] N [id number] 90.0  01/11/2018 OXYCODONE HCL 5 MG TABLET 60.00 5 [id number] 01/09/2018 [id number] N [id number] 90.0  01/04/2018 OXYCODONE HCL 5 MG TABLET 60.00 6 [id number] 05/08/2017 [id number] N [id number] 75.0  12/28/2017 OXYCODONE HCL 5 MG TABLET 60.00 5 [id number] 12/26/2017 [id number] N [id number] 90.0  12/21/2017 OXYCODONE HCL 5 MG TABLET 60.00 5 [id number]  12/21/2017 [id number] N [id number] 90.0  12/16/2017 OXYCODONE HCL 5 MG TABLET 60.00 5 [id number] 12/14/2017 [id number] N [id number] 90.0  12/11/2017 OXYCODONE HCL 5 MG TABLET 60.00 6 [id number] 12/11/2017 [id number] N [id number] 75.0  12/06/2017 OXYCODONE HCL 5 MG TABLET 60.00 6 [id number] 12/06/2017 [id number] N [id number] 75.0  12/01/2017 OXYCODONE HCL 5 MG TABLET 60.00 6 [id number] 11/29/2017 [id number] N [id number] 75.0  11/24/2017 OXYCODONE HCL 5 MG TABLET 90.00 8 [id number] 11/21/2017 [id number] N [id number] 84.38  11/15/2017 OXYCODONE HCL 5 MG TABLET 120.00 10 [id number] 11/14/2017 [id number] N [id number] 90.0  11/10/2017 OXYCODONE HCL 5 MG TABLET 60.00 6 [id number] 11/09/2017 [id number] N [id number] 75.0  11/05/2017 OXYCODONE HCL 5 MG TABLET 60.00 5 [id number] 11/05/2017 [id number] N [id number] 90.0  10/30/2017 OXYCODONE HCL 5 MG TABLET 65.00 6 [id number] 10/30/2017 [id number] N [id number] 81.25  10/23/2017 OXYCODONE HCL 5 MG TABLET 65.00 6 [id number] 10/18/2017 [id number] N [id number] 81.25  10/16/2017 OXYCODONE HCL 5 MG TABLET 65.00 6 [id number] 10/16/2017 [id number] N [id number] 81.25  10/09/2017 OXYCODONE HCL 5 MG TABLET 65.00 10 [id number] 10/09/2017 [id number] N [id number] 48.75  10/03/2017 OXYCODONE HCL 5 MG TABLET 60.00 5 [id number] 10/03/2017 [id number] N [id number] 90.0  **Per CDC guidance, the conversion factors and associated daily morphine milligram equivalents for drugs prescribed as part of  medication-assisted treatment for opioid use disorder should not be used to benchmark against dosage thresholds meant for opioids  prescribed for pain.  Report Disclaimers:  The report provided above is based upon the search criteria and the data provided by the dispensing entities. For more information  about any prescription, please contact the dispenser or the prescriber.  This report contains confidential information, including patient identifiers, and is  not a public record. The information on this  report must be treated as protected health information and is to be disclosed to others only as authorized by applicable state  and Federal regulations.    Assessment:   Jaime L Franklin-Behrends is a 52 y.o. female seen in clinic today for   Chief Complaint   Patient presents with     Ankle Pain     She presents the pain clinic today to follow-up on her pain care plan.  She was recently transitioned to OxyContin and oxycodone due to her lack of response to the morphine extended release and Norco combination.  Patient has been waiting to get back into her upcoming surgical intervention which is scheduled for June 16 with a going to do a hardware revision for her left ankle patient states that she has been in a walking boot for 2 years she is to continue this as directed by the orthopedist/podiatrist.  Because the patient's lack of response to the OxyContin and oxycodone likely she most likely needing to have her surgical intervention at this point however we will convert her to Dilaudid 4 mg tablets max of 6 per day until her surgery on June 16.  Patient knows that she is not to take more than 1 tablet at a time nor is she supposed overtake.  Patient has a prescription for Narcan at home.  2 prescriptions were sent and they will be weekly prescriptions from here on out max of 7 days at a time    They are here for follow up and continued medical management of their pain. Today we reviewed the lab work of the UDT test and the results are expected because of the prescribed narcotics found in the urine drug screen.     I have also reviewed the information obtained from the last visit encounter after the FERNANDA was signed and have asked any pertintent questions needed from other healthcare providers/family/patient to continue care and formulate a treatment plan.     Patients current MME is 96 a decrease in her pain medication regimen which was 105  Patient to call clinic for  next month's prescription 5 days in advance. Based on the patients response to their previous narcotic therapy and quality of life, which includes their participation in ADLs, I recommend that the narcotics therapy continue, however the changes listed below will be incorporated into their plan of care.     Patient set goals to   1. To be able to walk without assistance and tolerate the pain     Plan:   Interventions-    Follow up in 4 weeks to evaluate the effectiveness of the treatment interventions ordered today.     Therapy- PT/OT- waiting until your surgical intervention is done scheduled for June 9th     Will start you on dilaudid 4 mg max of 6 tabs per day scripts should last 7 days at a time ok to fill 5/30/2018, 6/6/2018, this should last you until 6/13/2018, please call for a refill 3 days in advance    Today you have approximately 4 days left of opioids, please bring in the extra once you fill your dilaudid, we do not want you to have extra at home.     I have increased the gabapentin to 300 mg 3 times a day.     Prescription Drug Management will be continued by the [facility] center  A narcotic contract was signed by the patient and  Patient is unable to get narcotics from other providers. They will be subject to random UAs.      Orders placed today  Medications that were ordered today   Requested Prescriptions     Signed Prescriptions Disp Refills     gabapentin (NEURONTIN) 300 MG capsule 90 capsule 0     Sig: Take 1 capsule (300 mg total) by mouth 3 (three) times a day.     HYDROmorphone (DILAUDID) 4 MG tablet 42 tablet 0     Sig: Take 1 tablet (4 mg total) by mouth every 4 (four) hours as needed for pain (max of 6 per day).     HYDROmorphone (DILAUDID) 4 MG tablet 42 tablet 0     Sig: Take 1 tablet (4 mg total) by mouth every 4 (four) hours as needed for pain (max of 6 per day).     No orders of the defined types were placed in this encounter.      UDT/SWAB:  Patient required a random Urine Drug  Testing, due to the need to comply with [place] [employer] Policy Guidelines and CDC Guideline for the use of any controlled substances. This is to ensure that patient is compliant with treatment, and monitor for risks such as diversion, abuse, or any other aberrant behaviors. Patient is either being considered for or taking a controlled substance. Unexpected findings will be discussed and treatment decision may be adjusted. Testing is being implemented across the board randomly w/o bias related to age, race, gender, socioeconomic status or [employer] affiliation.    The patient understand todays plan and has their questions answered in regards to expectations and current treatment plan.     SAFETY REMINDERS  No alcohol while taking controlled substances. Alcohol is not an illegal substance, it is unsafe to use in combination. It is a build up of substances in the body that can be extremely hazardous and may cause respirations to slow to a dangerous rate resulting in hospitalization, brain damage, or death.    Opioid medications have been associated with sharp rise in unintentional overdose and death.  Overdose is a condition characterized by the consumption in excess of a particular drug causing adverse effects. This can happen b/c you are sick, accidentally or intentionally took an extra dose, are on multiple medication that can interact. Someone took your medication and they are not use to the medication.  Symptoms of overdose include:   !breathing slow and shallow, erratic or not at all  !pinpoint pupils, hallucinations  !confusion  !muscle jerks, slack muscles   !extreme sleepiness or loss of alertness   !awake but not able to talk   !face pale or clammy, vomiting, for lighter skinned people, the skin tone turns bluish purple, for darker skinned people, it turns grayish or ashen   If in a situation where overdose is a concern engage the emergency response system (dial 911).    In one study it was noted that 80% of  unintentional overdoses occurred in people who were taking a combination of opioids and benzodiazepines.    Do not sell, loan, borrow or share your opioid medication with anyone. Deaths have occurred as a result of this practice. It is illegal and patients are being prosecuted.     Prevent unexpected access/loss of medication: Keep medication locked. Only carry what you need with you.    Celeste Perales, [facility]  [address]  [place] [address]  Ph: [phone]  Fax: [phone]

## 2021-06-19 NOTE — PROGRESS NOTES
PAIN CLINIC FOLLOW UP PROGRESS NOTE    CC:  Chief Complaint   Patient presents with     Ankle Pain       HPI  Jaime L Franklin-Behrends is a 53 y.o. female who presents for evaluation   Chief Complaint   Patient presents with     Ankle Pain    that is causing continued pain. Since the last visit the patient denies any trips to the urgent care or ED specifically for their pain. The patient denies any new medications, diagnoses since the last visit. Specific questions indicated the patient wanted addressed today include: to follow up on her chronic pain and address the newer left ankle post operative fusion pain that is contributing to her acute on chronic pain      Major issues:  1. Chronic pain syndrome    2. Postoperative pain    3. Status post ankle fusion    4. Status post hardware removal    5. S/P bone graft        Today the pain is located in their left foot and is described as throbbing, burning and shooting when it is aggravated it lasts for constant , and is rated at a 8 on a scale of 1-10  Associated symptoms: Denies any loss of bladder control, fevers/chills, unintentional weight loss, weakness, numbness or pain that interferes with sleep.   Aggravating factors include: increased activity and surgery   Alleviating factors: pain medications and ice with elevation  Adverse effects of medications: NONE   Functional symptoms: affects her in every way on daily bases due to the acute on chronic pain in her left ankle, using a scooter to mobilize   Current subjective treatment efficacy: Poor      Previous Medical History  History   Alcohol Use     Yes     Comment: socially none per pt     History   Drug Use     Yes     Special: Marijuana     History   Smoking Status     Current Every Day Smoker     Packs/day: 0.50     Years: 25.00   Smokeless Tobacco     Never Used     Comment: 3 cigs/d still trying to quit       Pertinent Pain Medications/interventions:  She currently takes gabapentin 300 mg three times a day,  "oxycodone 10 mg 5.5 tabs a day by her surgeon     Previously the plan with the pain center here was   Dilaudid 4 mg tabs 6 tabs per day and gabapentin 300 mg three times a day     Review of Systems:  12 point systems were reviewed with pt as documented on pt health form and the patient denies any new diagnosis or changes in 12 point system review since the last visit.     Physical Exam  Vitals:    07/20/18 0911   BP: 127/88   Patient Site: Left Arm   Patient Position: Sitting   Cuff Size: Adult Regular   Pulse: 100   Resp: 16   Weight: 206 lb (93.4 kg)   Height: 5' 7\" (1.702 m)     General- patient is alert and oriented, in NAD, well-groomed, well-nourished  Psych- Judgment and insight normal, AOx4, recent and remote memory normal, mood and affect normal  Eyes- pupils are equal and reactive, conjunctiva is clear bilaterally, no ptosis is noted.   Respiratory- breathing is non-labored  Cardiovascular- extremities warm and well perfused, no peripheral edema or varicosities.  Musculoskeletal- gait is normal, extremities with no joint swelling, erythema, or warmth. Right foot cast   Neuro- normal strength, no gait abnormalities, normal sensation to pain, temperature, light touch.  Integumentary- no rashes, dermatitis or discolorations noted throughout, no open wounds noted.    Medications    Current Outpatient Prescriptions:      acetaminophen (TYLENOL) 325 MG tablet, Take 2 tablets (650 mg total) by mouth daily as needed., Disp: , Rfl: 0     amLODIPine (NORVASC) 10 MG tablet, TK 1 T PO  q day, Disp: , Rfl: 0     aspirin 325 MG tablet, Take 1 tablet (325 mg total) by mouth 2 (two) times a day with meals., Disp: , Rfl: 0     buPROPion (WELLBUTRIN XL) 300 MG 24 hr tablet, Take 300 mg by mouth daily., Disp: , Rfl:      docusate sodium (COLACE) 100 MG capsule, Take 1 capsule (100 mg total) by mouth 2 (two) times a day., Disp: , Rfl: 0     gabapentin (NEURONTIN) 300 MG capsule, Take 2 capsules (600 mg total) by mouth 3 " (three) times a day., Disp: 180 capsule, Rfl: 0     hydrOXYzine pamoate (VISTARIL) 50 MG capsule, Take 1 capsule (50 mg total) by mouth every 4 (four) hours as needed for anxiety., Disp: 120 capsule, Rfl: 0     ibuprofen (ADVIL,MOTRIN) 200 MG tablet, Take 200 mg by mouth 4 (four) times a day as needed for pain., Disp: , Rfl:      PARoxetine (PAXIL) 40 MG tablet, Take 40 mg by mouth every morning., Disp: , Rfl:      HYDROmorphone (DILAUDID) 4 MG tablet, Take 1-2 tablets (4-8 mg total) by mouth 3 (three) times a day as needed (max of 6 tabs per day)., Disp: 180 tablet, Rfl: 0    Lab:  Last UDS on 2018 had expected results. Any abnormal results have been discussed with the patient and may change the plan of care. Please see the scanned document from the outside lab under the media tab. This was reviewed with the patient.      Imaging:  No new imaging.      Recent   Dated 2018 was reviewed with the patient today.   [employer] [place] Date: 18  Query Report Page#: 1  Patient Rx History Report  GERARD ZIMMERMANIME  Search Criteria: Last Name 'GERARD' and First Name 'Vamsi' and  = ' and Request Period = '17' to  ' - 3 out of 3 Recipients Selected.  Fill Date Product, Str, Form Qty Days Pt ID Prescriber Written RX# N/R* Pharm **MED+  ---------- -------------------------------- ------------ ---- --------- ---------- ---------- ------------ ----- --------- ------  2018 OXYCODONE HCL 10 MG TABLET 34.167049 6 [id number] 2018 [id number] N [id number] 85.0  07/10/2018 OXYCODONE HCL 10 MG TABLET 34.823833 8 [id number] 07/10/2018 [id number] N [id number] 63.75  2018 HYDROMORPHONE 8 MG TABLET 24.017712 16 [id number] 2018 [id number] N [id number] 48.0  2018 HYDROMORPHONE 8 MG TABLET 23.409653 8 [id number] 2018 [id number] N [id number] 92.0  2018 HYDROMORPHONE 4 MG TABLET 30.596532 5 [id number] 2018 [id number] N [id number]  96.0  06/20/2018 HYDROMORPHONE 4 MG TABLET 42.855278 7 [id number] 06/15/2018 [id number] N [id number] 96.0  06/13/2018 HYDROMORPHONE 4 MG TABLET 42.236869 7 [id number] 06/08/2018 878896 N [id number] 96.0  06/06/2018 HYDROMORPHONE 4 MG TABLET 42.400590 7 [id number] 05/30/2018 721185 N [id number] 96.0  05/30/2018 GABAPENTIN 300 MG CAPSULE 90.158343 30 [id number] 05/30/2018 129954 N [id number] 00.0  05/30/2018 HYDROMORPHONE 4 MG TABLET 42.352021 7 [id number] 05/30/2018 404302 N [id number] 96.0  05/22/2018 OXYCONTIN 15 MG TABLET 28.821344 14 [id number] 05/21/2018 323734 N [id number] 45.0  05/21/2018 OXYCODONE HCL 10 MG TABLET 42.445155 14 [id number] 05/21/2018 250548 N [id number] 45.0  05/21/2018 GABAPENTIN 300 MG CAPSULE 30.757877 30 [id number] 05/21/2018 148957 N [id number] 00.0  05/09/2018 HYDROCODONE-ACETAMIN 5-325 .637959 14 [id number] 04/23/2018 733309 N [id number] 40.0  04/25/2018 MORPHINE SULF ER 30 MG TABLET 60.271596 30 [id number] 04/23/2018 286743 N [id number] 60.0  04/25/2018 HYDROCODONE-ACETAMIN 5-325 .499438 14 [id number] 04/23/2018 899474 N [id number] 40.0  04/13/2018 HYDROCODONE-ACETAMIN 5-325 MG 96.132897 12 [id number] 04/13/2018 883087 N [id number] 40.0  04/13/2018 MORPHINE SULF ER 30 MG TABLET 24.818430 12 [id number] 04/13/2018 144198 N [id number] 60.0  04/09/2018 OXYCODONE HCL 5 MG TABLET 80.416976 7 [id number] 04/09/2018 618932 N [id number] 85.71  04/02/2018 OXYCODONE HCL 5 MG TABLET 80.401133 7 [id number] 04/02/2018 5964233 N [id number] 85.71  03/26/2018 OXYCODONE HCL 5 MG TABLET 80.478852 7 [id number] 03/26/2018 6202326 N [id number] 85.71  03/19/2018 OXYCODONE HCL 5 MG TABLET 80.641833 7 [id number] 03/14/2018 8190488 N [id number] 85.71  03/12/2018 OXYCODONE HCL 5 MG TABLET 90.358480 8 [id number] 03/12/2018 9424311 N [id number] 84.38  03/05/2018 OXYCODONE HCL 5 MG  TABLET 80.583850 8 [id number] 03/05/2018 859866 N [id number] 75.0  02/26/2018 OXYCODONE HCL 5 MG TABLET 80.648601 7 [id number] 02/26/2018 013751 N [id number] 85.71  02/19/2018 OXYCODONE HCL 5 MG TABLET 90.835731 8 [id number] 02/19/2018 7151984 N [id number] 84.38  02/12/2018 OXYCODONE HCL 5 MG TABLET 90.710489 8 [id number] 02/09/2018 9385292 N [id number] 84.38  02/05/2018 OXYCODONE HCL 5 MG TABLET 90.348585 8 [id number] 02/05/2018 6265743 N [id number] 84.38  01/29/2018 OXYCODONE HCL 5 MG TABLET 90.436766 8 [id number] 01/26/2018 0225981 N [id number] 84.38  01/24/2018 OXYCODONE HCL 5 MG TABLET 60.726546 5 [id number] 01/24/2018 [id number] N [id number] 90.0  01/17/2018 OXYCODONE HCL 5 MG TABLET 60.907537 5 [id number] 01/17/2018 789120 N [id number] 90.0  01/11/2018 OXYCODONE HCL 5 MG TABLET 60.235074 5 [id number] 01/09/2018 225429 N [id number] 90.0  01/04/2018 OXYCODONE HCL 5 MG TABLET 60.408172 6 [id number] 05/08/2017 1685907 N [id number] 75.0  12/28/2017 OXYCODONE HCL 5 MG TABLET 60.699421 5 [id number] 12/26/2017 9106242 N [id number] 90.0  12/21/2017 OXYCODONE HCL 5 MG TABLET 60.510073 5 [id number] 12/21/2017 7553801 N [id number] 90.0  12/16/2017 OXYCODONE HCL 5 MG TABLET 60.624029 5 [id number] 12/14/2017 206188 N [id number] 90.0  12/11/2017 OXYCODONE HCL 5 MG TABLET 60.606338 6 [id number] 12/11/2017 6125561 N [id number] 75.0  12/06/2017 OXYCODONE HCL 5 MG TABLET 60.913584 6 [id number] 12/06/2017 [id number] N [id number] 75.0  **Per CDC guidance, the conversion factors and associated daily morphine milligram equivalents for drugs prescribed as part of  medication-assisted treatment for opioid use disorder should not be used to benchmark against dosage thresholds meant for opioids  prescribed for pain.  Report Disclaimers:  The report provided above is based upon the search criteria and the data provided by the dispensing  entities. For more information  about any prescription, please contact the dispenser or the prescriber.  This report contains confidential information, including patient identifiers, and is not a public record. The information on this  report must be treated as protected health information and is to be disclosed to others only as authorized by applicable state  and Federal regulations.    Assessment:   Jaime L Franklin-Behrends is a 53 y.o. female seen in clinic today for   Chief Complaint   Patient presents with     Ankle Pain       She presents the pain clinic today after her re-op on her left ankle after 2 years of surgical history and nonunion with hardware failure.  Patient is a smoker she is also had chronic pain management here a few months prior to her last surgery.  Patient reports that the surgeon did put her on some Dilaudid after surgery of approximately 8 mg tablets at a reduced rate however then switched her to oxycodone 10 mg tablets which she feels that she has been overtaking due to inability to control the pain.  Patient's MME was 48 and slowly increased up to 85 with the surgeon's prescriptions.  Previously patient was on an MME of 96.  Today the patient is asking for an increase in her pain medication back to where she was prior to surgery for her chronic pain at this time I will provide this increase of Dilaudid 4 mg tablets max of 6 per day and give her Vistaril as well.  Patient is complaining about a burning sensation and throbbing in her ankle I will provide an increase in her gabapentin up to 600 mg 3 times a day.    Patient knows going forward she needs to bring in her medications for pill counts and to bring in any of the left over oxycodone for destruction that the surgeon has provided for her today she tells me she has 3-4 tablets remaining.     She understands that she is not able to overtake her medication prescribed to her today I have given her 1 months worth of at this current dose  there will be no early refills and I anticipate we will start decreasing when I see her at her next appointment as she continues to recover.     Patients current MME is 96  Patient to call clinic for next month's prescription 5 days in advance. Based on the patients response to their previous narcotic therapy and quality of life, which includes their participation in ADLs, I recommend that the narcotics therapy continue, however the changes listed below will be incorporated into their plan of care.     Patient set goals to   1.  Follow-up on her acute and chronic pain that she now has after her preop on her left ankle for a nonunion and hardware failure    Plan:   Interventions-    Follow up in 4 weeks to evaluate the effectiveness of the treatment interventions ordered today.     Please bring any opioids or controlled substances in that are prescribed by the clinic for pill counts for every visit.     Take the vistaril and the dilaudid together to assist with the pain control     Increase in the gabapentin to 600 mg three times a day     reschedule with Dr. Ramirez, if you are put on an anti-anxiety medication will re-evaluate the MME of the dilaudid     Will anticipate that need for the dilaudid to continue to go down due to the recovery phase.     Prescription Drug Management will be continued by the [facility]  A narcotic contract was signed by the patient and  Patient is unable to get narcotics from other providers. They will be subject to random UAs.      Orders placed today  Medications that were ordered today   Requested Prescriptions     Signed Prescriptions Disp Refills     HYDROmorphone (DILAUDID) 4 MG tablet 180 tablet 0     Sig: Take 1-2 tablets (4-8 mg total) by mouth 3 (three) times a day as needed (max of 6 tabs per day).     hydrOXYzine pamoate (VISTARIL) 50 MG capsule 120 capsule 0     Sig: Take 1 capsule (50 mg total) by mouth every 4 (four) hours as needed for anxiety.      gabapentin (NEURONTIN) 300 MG capsule 180 capsule 0     Sig: Take 2 capsules (600 mg total) by mouth 3 (three) times a day.     No orders of the defined types were placed in this encounter.      UDT/SWAB:  Patient required a random Urine Drug Testing, due to the need to comply with [facility] Model Policy Guidelines and CDC Guideline for the use of any controlled substances. This is to ensure that patient is compliant with treatment, and monitor for risks such as diversion, abuse, or any other aberrant behaviors. Patient is either being considered for or taking a controlled substance. Unexpected findings will be discussed and treatment decision may be adjusted. Testing is being implemented across the board randomly w/o bias related to age, race, gender, socioeconomic status or [employer] affiliation.    The patient understand todays plan and has their questions answered in regards to expectations and current treatment plan.     SAFETY REMINDERS  No alcohol while taking controlled substances. Alcohol is not an illegal substance, it is unsafe to use in combination. It is a build up of substances in the body that can be extremely hazardous and may cause respirations to slow to a dangerous rate resulting in hospitalization, brain damage, or death.    Opioid medications have been associated with sharp rise in unintentional overdose and death.  Overdose is a condition characterized by the consumption in excess of a particular drug causing adverse effects. This can happen b/c you are sick, accidentally or intentionally took an extra dose, are on multiple medication that can interact. Someone took your medication and they are not use to the medication.  Symptoms of overdose include:   !breathing slow and shallow, erratic or not at all  !pinpoint pupils, hallucinations  !confusion  !muscle jerks, slack muscles   !extreme sleepiness or loss of alertness   !awake but not able to talk   !face pale or clammy, vomiting, for  lighter skinned people, the skin tone turns bluish purple, for darker skinned people, it turns grayish or ashen   If in a situation where overdose is a concern engage the emergency response system (dial 911).    In one study it was noted that 80% of unintentional overdoses occurred in people who were taking a combination of opioids and benzodiazepines.    Do not sell, loan, borrow or share your opioid medication with anyone. Deaths have occurred as a result of this practice. It is illegal and patients are being prosecuted.     Prevent unexpected access/loss of medication: Keep medication locked. Only carry what you need with you.    Celeste Perales, [facility]  [address]  [place] [address]  Ph: [phone]  Fax: [phone]
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
## 2021-06-19 NOTE — PROGRESS NOTES
Patient presents to the clinic for a follow up with Celeste Perales CNP in regards to her ankle pain.

## 2021-06-19 NOTE — PROGRESS NOTES
Patient presents to the clinic today for a follow up with Celeste Perales CNP regarding ankle pain.

## 2021-06-20 NOTE — PROGRESS NOTES
PAIN CLINIC FOLLOW UP PROGRESS NOTE    CC:  Chief Complaint   Patient presents with     Ankle Pain       HPI  Jaime L Franklin-Behrends is a 53 y.o. female who presents for evaluation   Chief Complaint   Patient presents with     Ankle Pain    that is causing continued pain. Since the last visit the patient denies any trips to the urgent care or ED specifically for their pain. The patient denies any new medications, diagnoses since the last visit. 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     Major issues:  1. Chronic pain syndrome    2. Postoperative pain    3. Status post ankle fusion    4. Status post hardware removal    5. S/P bone graft    6. Chronic, continuous use of opioids      Today the pain is located in their left foot and is described as throbbing, burning and shooting when it is aggravated it lasts for constant , and is rated at a 10 on a scale of 1-10, this is an increase since the last visit. Patient notes that the pain started to increase when the cast was removed and the walking boot was placed.      Associated symptoms: Denies any loss of bladder control, fevers/chills, unintentional weight loss, weakness, numbness or pain that interferes with sleep.     Aggravating factors include: increased activity and surgery   Alleviating factors: pain medications and ice with elevation  Adverse effects of medications: NONE   Functional symptoms: affects her in every way on daily bases due to the acute on chronic pain in her left ankle, using a scooter to mobilize   Current subjective treatment efficacy: Poor    Previous Medical History  History   Alcohol Use     Yes     Comment: socially none per pt     History   Drug Use     Yes     Special: Marijuana     History   Smoking Status  "    Current Every Day Smoker     Packs/day: 0.50     Years: 25.00   Smokeless Tobacco     Never Used     Comment: 3 cigs/d still trying to quit       Pertinent Pain Medications/interventions:  She currently takes dilaudid 4 mg up to 5-6 per day and gabapentin 600 mg three times a day prn.      Review of Systems:  12 point systems were reviewed with pt as documented on pt health form and the patient denies any new diagnosis or changes in 12 point system review since the last visit. Increased pain in the left lower extremity     Physical Exam  Vitals:    10/08/18 0753   BP: 129/81   Patient Site: Right Arm   Patient Position: Sitting   Cuff Size: Adult Regular   Pulse: (!) 101   Resp: 18   Weight: 206 lb (93.4 kg)   Height: 5' 7\" (1.702 m)     General- patient is alert and oriented, in NAD, well-groomed, well-nourished  Psych- Judgment and insight normal, AOx4, recent and remote memory normal, mood and affect normal  Eyes- pupils are equal and reactive, conjunctiva is clear bilaterally, no ptosis is noted.   Respiratory- breathing is non-labored  Cardiovascular- extremities warm and well perfused, no peripheral edema or varicosities.  Musculoskeletal- gait is normal, extremities with no joint swelling, erythema, or warmth. Left lower extremity with a walking boot in place.   Neuro- normal strength, no gait abnormalities, normal sensation to pain, temperature, light touch.  Integumentary- no rashes, dermatitis or discolorations noted throughout, no open wounds noted.    Medications    Current Outpatient Prescriptions:      amLODIPine (NORVASC) 10 MG tablet, TK 1 T PO  q day, Disp: , Rfl: 0     aspirin 325 MG tablet, Take 1 tablet (325 mg total) by mouth 2 (two) times a day with meals., Disp: , Rfl: 0     buPROPion (WELLBUTRIN XL) 300 MG 24 hr tablet, Take 1 tablet (300 mg total) by mouth daily., Disp: 30 tablet, Rfl: 0     docusate sodium (COLACE) 100 MG capsule, Take 1 capsule (100 mg total) by mouth 2 (two) times a " day., Disp: , Rfl: 0     DULoxetine (CYMBALTA) 20 MG capsule, Take 1 capsule (20 mg total) by mouth 3 (three) times a day., Disp: 90 capsule, Rfl: 0     gabapentin (NEURONTIN) 300 MG capsule, TAKE 2 CAPSULES(600 MG) BY MOUTH THREE TIMES DAILY, this is an increase, Disp: 180 capsule, Rfl: 1     lamoTRIgine (LAMICTAL) 25 MG tablet, Take two tabs in am and one tab at bedtime, Disp: 90 tablet, Rfl: 1     SENEXON-S 8.6-50 mg tablet, TAKE 1 TABLET BY MOUTH THREE TIMES DAILY AS NEEDED FOR CONSTIPATION, Disp: 90 tablet, Rfl: 0     varenicline (CHANTIX STARTING MONTH BOX) 0.5 mg (11)- 1 mg (42) tablet, 1 wk before you stop smoking take 0.5mg daily on days 1-3, 0.5mg 2 times each day on days 4-7, then 1mg 2 times daily., Disp: 53 tablet, Rfl: 0     varenicline (CHANTIX) 1 mg tablet, Take 1 tab by mouth two times a day. Take after eating with a full glass of water. NOTE: Dispense as maintenance for refills only., Disp: 60 tablet, Rfl: 2     acetaminophen (TYLENOL EXTRA STRENGTH) 500 MG tablet, Take 2 tablets (1,000 mg total) by mouth 3 (three) times a day., Disp: 180 tablet, Rfl: 0     acetaminophen (TYLENOL EXTRA STRENGTH) 500 MG tablet, Take 2 tablets (1,000 mg total) by mouth 3 (three) times a day., Disp: 180 tablet, Rfl: 2     ascorbic acid, vitamin C, (VITAMIN C) 1000 MG tablet, Take 1 tablet (1,000 mg total) by mouth daily., Disp: 30 tablet, Rfl: 4     ergocalciferol (VITAMIN D2) 50,000 unit capsule, Take 1 capsule (50,000 Units total) by mouth every 7 days., Disp: 4 capsule, Rfl: 4     [START ON 10/10/2018] HYDROmorphone (DILAUDID) 4 MG tablet, Take 1-2 tablets (4-8 mg total) by mouth 3 (three) times a day as needed (max of 6 tabs per day)., Disp: 84 tablet, Rfl: 0    Lab:  Last UDS on 4/13/2018 had expected results. Any abnormal results have been discussed with the patient and may change the plan of care. Please see the scanned document from the outside lab under the media tab. This was reviewed with the patient.       Imaging:  No new imaging.      Recent   Dated 10/8/2018 was reviewed with the patient today.   [facility] [place] Date: 10/05/18  Query Report Page#: 1  Patient Rx History Report  YOMI BOOKER  Search Criteria: Last Name 'yomi' and First Name 'ivett' and  = ' and Request Period = '10/05/17' to  '10/05/18' - 2 out of 2 Recipients Selected.  Fill Date Product, Str, Form Qty Days Pt ID Prescriber Written RX# N/R* Pharm **MED+  ---------- -------------------------------- ------------ ---- --------- ---------- ---------- ------------ ----- --------- ------  2018 GABAPENTIN 300 MG CAPSULE 180.798600 30 [id number] 2018 [id number] R [id number] 00.0  09/15/2018 HYDROMORPHONE 4 MG TABLET 180.584509 30 [id number] 2018 [id number] N [id number] 96.0  2018 GABAPENTIN 300 MG CAPSULE 180.348049 30 [id number] 2018 [id number] N [id number] 00.0  2018 HYDROMORPHONE 4 MG TABLET 177.787955 30 [id number] 2018 [id number] N [id number] 94.4  08/15/2018 GABAPENTIN 300 MG CAPSULE 90.784644 30 [id number] 06/15/2018 [id number] N [id number] 00.0  2018 HYDROMORPHONE 4 MG TABLET 23.638812 4 [id number] 2018 [id number] N [id number] 92.0  2018 GABAPENTIN 300 MG CAPSULE 180.627757 30 [id number] 2018 [id number] N [id number] 00.0  2018 HYDROMORPHONE 4 MG TABLET 157.247433 27 [id number] 2018 [id number] N [id number] 93.04  2018 OXYCODONE HCL 10 MG TABLET 34.984194 6 [id number] 2018 [id number] N [id number] 85.0  07/10/2018 OXYCODONE HCL 10 MG TABLET 34.148616 8 [id number] 07/10/2018 [id number] N [id number] 63.75  2018 HYDROMORPHONE 8 MG TABLET 24.934329 16 [id number] 2018 [id number] N [id number] 48.0  2018 HYDROMORPHONE 8 MG TABLET 23.333939 8 [id number] 2018 [id number] N [id number] 92.0  2018 HYDROMORPHONE 4 MG TABLET 30.127826 5 [id number] 2018 [id number] N  [id number] 96.0  06/20/2018 HYDROMORPHONE 4 MG TABLET 42.865967 7 [id number] 06/15/2018 [id number] N [id number] 96.0  06/13/2018 HYDROMORPHONE 4 MG TABLET 42.661184 7 [id number] 06/08/2018 878992 N [id number] 96.0  06/06/2018 HYDROMORPHONE 4 MG TABLET 42.417389 7 [id number] 05/30/2018 783236 N [id number] 96.0  05/30/2018 GABAPENTIN 300 MG CAPSULE 90.348420 30 [id number] 05/30/2018 913459 N [id number] 00.0  05/30/2018 HYDROMORPHONE 4 MG TABLET 42.720041 7 [id number] 05/30/2018 388692 N [id number] 96.0  05/22/2018 OXYCONTIN 15 MG TABLET 28.466993 14 [id number] 05/21/2018 981089 N [id number] 45.0  05/21/2018 OXYCODONE HCL 10 MG TABLET 42.263391 14 [id number] 05/21/2018 [id number] N [id number] 45.0  05/21/2018 GABAPENTIN 300 MG CAPSULE 30.611839 30 [id number] 05/21/2018 [id number] N [id number] 00.0  05/09/2018 HYDROCODONE-ACETAMIN 5-325 .151856 14 [id number] 04/23/2018 961789 N [id number] 40.0  04/25/2018 MORPHINE SULF ER 30 MG TABLET 60.948967 30 [id number] 04/23/2018 290991 N [id number] 60.0  04/25/2018 HYDROCODONE-ACETAMIN 5-325 .141514 14 [id number] 04/23/2018 619681 N [id number] 40.0  04/13/2018 HYDROCODONE-ACETAMIN 5-325 MG 96.726522 12 [id number] 04/13/2018 [id number] N [id number] 40.0  04/13/2018 MORPHINE SULF ER 30 MG TABLET 24.320398 12 [id number] 04/13/2018 379946 N [id number] 60.0  04/09/2018 OXYCODONE HCL 5 MG TABLET 80.993721 7 [id number] 04/09/2018 742868 N [id number] 85.71  04/02/2018 OXYCODONE HCL 5 MG TABLET 80.587535 7 [id number] 04/02/2018 [id number] N [id number] 85.71  03/26/2018 OXYCODONE HCL 5 MG TABLET 80.054811 7 [id number] 03/26/2018 9573629 N [id number] 85.71  03/19/2018 OXYCODONE HCL 5 MG TABLET 80.864122 7 [id number] 03/14/2018 8232744 N [id number] 85.71  03/12/2018 OXYCODONE HCL 5 MG TABLET 90.525510 8 [id number] 03/12/2018 [id number] N [id number] 84.38  03/05/2018 OXYCODONE  HCL 5 MG TABLET 80.610877 8 [id number] 03/05/2018 387909 N [id number] 75.0  02/26/2018 OXYCODONE HCL 5 MG TABLET 80.618640 7 [id number] 02/26/2018 215330 N [id number] 85.71  02/19/2018 OXYCODONE HCL 5 MG TABLET 90.020937 8 [id number] 02/19/2018 9207213 N [id number] 84.38  02/12/2018 OXYCODONE HCL 5 MG TABLET 90.023416 8 [id number] 02/09/2018 [id number] N [id number] 84.38  02/05/2018 OXYCODONE HCL 5 MG TABLET 90.984665 8 [id number] 02/05/2018 [id number] N [id number] 84.38  01/29/2018 OXYCODONE HCL 5 MG TABLET 90.719891 8 [id number] 01/26/2018 [id number] N [id number] 84.38  01/24/2018 OXYCODONE HCL 5 MG TABLET 60.412685 5 [id number] 01/24/2018 [id number] N [id number] 90.0  **Per CDC guidance, the conversion factors and associated daily morphine milligram equivalents for drugs prescribed as part of  medication-assisted treatment for opioid use disorder should not be used to benchmark against dosage thresholds meant for opioids  prescribed for pain.  Report Disclaimers:  The report provided above is based upon the search criteria and the data provided by the dispensing entities. For more information  about any prescription, please contact the dispenser or the prescriber.  This report contains confidential information, including patient identifiers, and is not a public record. The information on this  report must be treated as protected health information and is to be disclosed to others only as authorized by applicable state  and Federal regulations.    Assessment:   Jaime L Franklin-Behrends is a 53 y.o. female seen in clinic today for   Chief Complaint   Patient presents with     Ankle Pain     Patient presents the pain started follow-up on her chronic pain treatment plan.  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  Patient went from a cast to a walking boot recently and had  an increase in her pain.  Patient over took her Dilaudid and she is 10 days short.  Patient notes that she did better when she had restricted prescriptions to every 2 weeks instead of 30 days we will go ahead and transition back to that today, there will be no increase in the Dilaudid and patient no she is to follow-up with Dr. Blount her podiatrist.      We did discuss other medications such as her Cymbalta Lamictal which she continues to take as she is as well as the gabapentin.  Patient would likely benefit foot from scheduled Tylenol this is recommended today as well as taking of her walking boot and elevating it and applying ice.  We did discuss today the differences between chemical dependency versus addiction patient does have a few concerns about this and we did continue to talk about how we would treat this if this were the case.      At this time and her recovery in anticipation of a weaning would have occurred today however with this overtaking due to the increase in pain we are not able to do that today.  We will continue her at her current regimen but only do 2-week prescriptions and closer monitoring patient will return in 2 weeks for follow-up    Patients current MME is 96  Patient to call clinic for next month's prescription 5 days in advance. Based on the patients response to their previous narcotic therapy and quality of life, which includes their participation in ADLs, I recommend that the narcotics therapy continue, however the changes listed below will be incorporated into their plan of care.     Patient set goals to   1. To continue cares with the ongoing pain that is related to her ongoing left ankle pain for the past 2 years which is residual from a non-union and repetitive surgical interventions.      Plan:   Interventions-    Follow up in 2 weeks to evaluate the effectiveness of the treatment interventions ordered today.     Please bring any opioids or controlled substances in that are  prescribed by the clinic for pill counts for every visit.     Start taking supplements for the fusion Vitamin D, vitamin C     Continue with the Cymbalta.   Defer to Dr. Ramirez in regards to his treatment plan.   Continue taking the dilaudid changed to 14 day scripts. Dilaudid 4 mg tabs up to 6 per day, allow this early fill, if insurance does not cover this will send in a prescription for oxycodone 10 mg tab up to 6 per day to avoid withdrawals, if this is not covered will send in withdrawal medications.     Please follow up with Dr. Ramos as you report since changing from the cast to the walking boot your pain has increased significantly    Start with tylenol 1000 mg three times a day scheduled for assistance with pain control     Prescription Drug Management will be continued by the [facility]  A narcotic contract was signed by the patient and  Patient is unable to get narcotics from other providers. They will be subject to random UAs.      Orders placed today  Medications that were ordered today   Requested Prescriptions     Signed Prescriptions Disp Refills     HYDROmorphone (DILAUDID) 4 MG tablet 84 tablet 0     Sig: Take 1-2 tablets (4-8 mg total) by mouth 3 (three) times a day as needed (max of 6 tabs per day).     ascorbic acid, vitamin C, (VITAMIN C) 1000 MG tablet 30 tablet 4     Sig: Take 1 tablet (1,000 mg total) by mouth daily.     acetaminophen (TYLENOL EXTRA STRENGTH) 500 MG tablet 180 tablet 2     Sig: Take 2 tablets (1,000 mg total) by mouth 3 (three) times a day.     ergocalciferol (VITAMIN D2) 50,000 unit capsule 4 capsule 4     Sig: Take 1 capsule (50,000 Units total) by mouth every 7 days.     DULoxetine (CYMBALTA) 20 MG capsule 90 capsule 0     Sig: Take 1 capsule (20 mg total) by mouth 3 (three) times a day.     No orders of the defined types were placed in this encounter.      UDT/SWAB:  Patient required a random Urine Drug Testing, due to the need to comply with [place]  Model Policy Guidelines and CDC Guideline for the use of any controlled substances. This is to ensure that patient is compliant with treatment, and monitor for risks such as diversion, abuse, or any other aberrant behaviors. Patient is either being considered for or taking a controlled substance. Unexpected findings will be discussed and treatment decision may be adjusted. Testing is being implemented across the board randomly w/o bias related to age, race, gender, socioeconomic status or [employer] affiliation.    The patient understand todays plan and has their questions answered in regards to expectations and current treatment plan.     SAFETY REMINDERS  No alcohol while taking controlled substances. Alcohol is not an illegal substance, it is unsafe to use in combination. It is a build up of substances in the body that can be extremely hazardous and may cause respirations to slow to a dangerous rate resulting in hospitalization, brain damage, or death.    Opioid medications have been associated with sharp rise in unintentional overdose and death.  Overdose is a condition characterized by the consumption in excess of a particular drug causing adverse effects. This can happen b/c you are sick, accidentally or intentionally took an extra dose, are on multiple medication that can interact. Someone took your medication and they are not use to the medication.  Symptoms of overdose include:   !breathing slow and shallow, erratic or not at all  !pinpoint pupils, hallucinations  !confusion  !muscle jerks, slack muscles   !extreme sleepiness or loss of alertness   !awake but not able to talk   !face pale or clammy, vomiting, for lighter skinned people, the skin tone turns bluish purple, for darker skinned people, it turns grayish or ashen   If in a situation where overdose is a concern engage the emergency response system (dial 911).    In one study it was noted that 80% of unintentional overdoses occurred in people who  were taking a combination of opioids and benzodiazepines.    Do not sell, loan, borrow or share your opioid medication with anyone. Deaths have occurred as a result of this practice. It is illegal and patients are being prosecuted.     Prevent unexpected access/loss of medication: Keep medication locked. Only carry what you need with you.    Celeste Perales, [facility]  [address]  [place] [address]  Ph: [phone]  Fax: [phone]

## 2021-06-20 NOTE — PROGRESS NOTES
Patient did not show.  Also did not show on 8/24.  Given agoraphobia is one of her problems I called.  She acknowledges indeed since coming off the Wellbutrin she has been waking up crying, struggling in terms of anxiety, and has not been able to get out and was not able to come to appointments.  She had been taking Wellbutrin 300 mg daily and she will resume that.    She did try the duloxetine increasing up to she believes 3 of the 20 mg tablets daily.  Did not have side effects and continue with panic attacks.  She is aware the was a refill.  She will increase it to 80 mg daily for 2 weeks, if needed to 100 mg daily.  Discussed its goal to help blocked the anxiety attacks and help with mood.  We will then changed to 60 mg tablets.    She did not picj up the prescription for lamotrigine.  Reviewed this may be helpful for depression and pain with a different mechanism of action and she will pick this up to start taking as directed.    I had recommended she obtain some laboratories which she has not done by think she can do.  She is scheduled for appointment Celeste Perales in 2 weeks anticipates being able to go to that.  She will reschedule with me as well

## 2021-06-20 NOTE — PROGRESS NOTES
PAIN CLINIC FOLLOW UP PROGRESS NOTE    CC:  Chief Complaint   Patient presents with     Ankle Pain       HPI  Jaime L Franklin-Behrends is a 53 y.o. female who presents for evaluation   Chief Complaint   Patient presents with     Ankle Pain    that is causing continued pain. Since the last visit the patient denies any trips to the urgent care or ED specifically for their pain. The patient denies any new medications, diagnoses since the last visit. Specific questions indicated the patient wanted addressed today include: to follow up on her chronic pain as well as needing a fill for her depression medication that was supposed to be sent in.     Major issues:  1. S/P ankle ligament repair    2. Postoperative pain    3. Status post ankle fusion    4. Status post hardware removal    5. S/P bone graft    6. Chronic, continuous use of opioids    7. Depression, unspecified depression type    8. Chronic pain of left ankle      Today the pain is located in their left foot and is described as throbbing, burning and shooting when it is aggravated it lasts for constant , and is rated at a 8 on a scale of 1-10, this is a decrease in her last assessment     Associated symptoms: Denies any loss of bladder control, fevers/chills, unintentional weight loss, weakness, numbness or pain that interferes with sleep.     Aggravating factors include: increased activity and surgery   Alleviating factors: pain medications and ice with elevation  Adverse effects of medications: NONE   Functional symptoms: affects her in every way on daily bases due to the acute on chronic pain in her left ankle, using a scooter to mobilize   Current subjective treatment efficacy: Poor      Previous Medical History  History   Alcohol Use     Yes     Comment: socially none per pt     History   Drug Use     Yes     Special: Marijuana     History   Smoking Status     Current Every Day Smoker     Packs/day: 0.50     Years: 25.00   Smokeless Tobacco     Never Used  "    Comment: 3 cigs/d still trying to quit       Pertinent Pain Medications/interventions:  She currently takes dilaudid 4 mg up to 5-6 per day and gabapentin 600 mg three times a day prn     Review of Systems:  12 point systems were reviewed with pt as documented on pt health form and the patient denies any new diagnosis or changes in 12 point system review since the last visit.     Physical Exam  Vitals:    09/14/18 0854   BP: 128/90   Patient Site: Left Arm   Patient Position: Sitting   Cuff Size: Adult Regular   Pulse: 96   Resp: 18   Weight: 206 lb (93.4 kg)   Height: 5' 7\" (1.702 m)     General- patient is alert and oriented, in NAD, well-groomed, well-nourished  Psych- Judgment and insight normal, AOx4, recent and remote memory normal, mood and affect normal  Eyes- pupils are equal and reactive, conjunctiva is clear bilaterally, no ptosis is noted.   Respiratory- breathing is non-labored  Cardiovascular- extremities warm and well perfused, no peripheral edema or varicosities.  Musculoskeletal- gait is normal, extremities with no joint swelling, erythema, or warmth. Abnormal gait due to a walking cast on the left lower extremity  Neuro- normal strength, no gait abnormalities, normal sensation to pain, temperature, light touch.  Integumentary- no rashes, dermatitis or discolorations noted throughout, no open wounds noted.    Medications    Current Outpatient Prescriptions:      acetaminophen (TYLENOL EXTRA STRENGTH) 500 MG tablet, Take 2 tablets (1,000 mg total) by mouth 3 (three) times a day., Disp: 180 tablet, Rfl: 0     amLODIPine (NORVASC) 10 MG tablet, TK 1 T PO  q day, Disp: , Rfl: 0     aspirin 325 MG tablet, Take 1 tablet (325 mg total) by mouth 2 (two) times a day with meals., Disp: , Rfl: 0     docusate sodium (COLACE) 100 MG capsule, Take 1 capsule (100 mg total) by mouth 2 (two) times a day., Disp: , Rfl: 0     DULoxetine (CYMBALTA) 20 MG capsule, Take 1 capsule (20 mg total) by mouth 3 (three) " times a day., Disp: 90 capsule, Rfl: 0     hydrOXYzine pamoate (VISTARIL) 50 MG capsule, Take 1 capsule (50 mg total) by mouth every 4 (four) hours as needed for anxiety., Disp: 120 capsule, Rfl: 0     lamoTRIgine (LAMICTAL) 25 MG tablet, Take two tabs in am and one tab at bedtime, Disp: 90 tablet, Rfl: 1     varenicline (CHANTIX STARTING MONTH BOX) 0.5 mg (11)- 1 mg (42) tablet, 1 wk before you stop smoking take 0.5mg daily on days 1-3, 0.5mg 2 times each day on days 4-7, then 1mg 2 times daily., Disp: 53 tablet, Rfl: 0     varenicline (CHANTIX) 1 mg tablet, Take 1 tab by mouth two times a day. Take after eating with a full glass of water. NOTE: Dispense as maintenance for refills only., Disp: 60 tablet, Rfl: 2     buPROPion (WELLBUTRIN XL) 300 MG 24 hr tablet, Take 1 tablet (300 mg total) by mouth daily., Disp: 30 tablet, Rfl: 0     [START ON 10/1/2018] gabapentin (NEURONTIN) 300 MG capsule, TAKE 2 CAPSULES(600 MG) BY MOUTH THREE TIMES DAILY, this is an increase, Disp: 180 capsule, Rfl: 1     [START ON 9/15/2018] HYDROmorphone (DILAUDID) 4 MG tablet, Take 1-2 tablets (4-8 mg total) by mouth 3 (three) times a day as needed (max of 6 tabs per day)., Disp: 180 tablet, Rfl: 0    Lab:  Last UDS on 2018 had expected results. Any abnormal results have been discussed with the patient and may change the plan of care. Please see the scanned document from the outside lab under the media tab. This was reviewed with the patient.      Imaging:  No new imaging.      Recent   Dated 2018 was reviewed with the patient today.   [employer] [place] Date: 18  Query Report Page#: 1  Patient Rx History Report  GERARD AGUSTIN  Search Criteria: Last Name 'GERARD' and First Name 'Govind' and  = '67' and Request Period = '17' to  '18' - 3 out of 3 Recipients Selected.  Fill Date Product, Str, Form Qty Days Pt ID Prescriber Written RX# N/R* Pharm **MED+  ----------  -------------------------------- ------------ ---- --------- ---------- ---------- ------------ ----- --------- ------  09/01/2018 GABAPENTIN 300 MG CAPSULE 180.890117 30 [id number] 08/17/2018 [id number] N [id number] 00.0  08/17/2018 HYDROMORPHONE 4 MG TABLET 177.956601 30 [id number] 08/17/2018 029495 N [id number] 94.4  08/15/2018 GABAPENTIN 300 MG CAPSULE 90.255487 30 [id number] 06/15/2018 494017 N [id number] 00.0  08/13/2018 HYDROMORPHONE 4 MG TABLET 23.953368 4 [id number] 07/24/2018 946872 N [id number] 92.0  07/20/2018 GABAPENTIN 300 MG CAPSULE 180.491426 30 [id number] 07/20/2018 562203 N [id number] 00.0  07/20/2018 HYDROMORPHONE 4 MG TABLET 157.690932 27 [id number] 07/20/2018 276823 N [id number] 93.04  07/17/2018 OXYCODONE HCL 10 MG TABLET 34.839736 6 [id number] 07/17/2018 339824 N [id number] 85.0  07/10/2018 OXYCODONE HCL 10 MG TABLET 34.556690 8 [id number] 07/10/2018 9562355 N [id number] 63.75  07/05/2018 HYDROMORPHONE 8 MG TABLET 24.912451 16 [id number] 07/03/2018 506654 N [id number] 48.0  06/28/2018 HYDROMORPHONE 8 MG TABLET 23.786175 8 [id number] 06/28/2018 753205 N [id number] 92.0  06/26/2018 HYDROMORPHONE 4 MG TABLET 30.993938 5 [id number] 06/24/2018 214243 N [id number] 96.0  06/20/2018 HYDROMORPHONE 4 MG TABLET 42.863892 7 [id number] 06/15/2018 367843 N [id number] 96.0  06/13/2018 HYDROMORPHONE 4 MG TABLET 42.565308 7 [id number] 06/08/2018 140414 N [id number] 96.0  06/06/2018 HYDROMORPHONE 4 MG TABLET 42.327135 7 [id number] 05/30/2018 208884 N [id number] 96.0  05/30/2018 GABAPENTIN 300 MG CAPSULE 90.865391 30 [id number] 05/30/2018 706727 N [id number] 00.0  05/30/2018 HYDROMORPHONE 4 MG TABLET 42.168631 7 [id number] 05/30/2018 690054 N [id number] 96.0  05/22/2018 OXYCONTIN 15 MG TABLET 28.505865 14 [id number] 05/21/2018 050933 N [id number] 45.0  05/21/2018 OXYCODONE HCL 10 MG TABLET 42.369738  14 [id number] 05/21/2018 612521 N [id number] 45.0  05/21/2018 GABAPENTIN 300 MG CAPSULE 30.941713 30 [id number] 05/21/2018 479421 N [id number] 00.0  05/09/2018 HYDROCODONE-ACETAMIN 5-325 .334940 14 [id number] 04/23/2018 159576 N [id number] 40.0  04/25/2018 MORPHINE SULF ER 30 MG TABLET 60.662197 30 [id number] 04/23/2018 126440 N [id number] 60.0  04/25/2018 HYDROCODONE-ACETAMIN 5-325 .592385 14 [id number] 04/23/2018 584665 N [id number] 40.0  04/13/2018 HYDROCODONE-ACETAMIN 5-325 MG 96.644878 12 [id number] 04/13/2018 192806 N [id number] 40.0  04/13/2018 MORPHINE SULF ER 30 MG TABLET 24.194801 12 [id number] 04/13/2018 394106 N [id number] 60.0  04/09/2018 OXYCODONE HCL 5 MG TABLET 80.964062 7 [id number] 04/09/2018 510417 N [id number] 85.71  04/02/2018 OXYCODONE HCL 5 MG TABLET 80.181853 7 [id number] 04/02/2018 4720716 N [id number] 85.71  03/26/2018 OXYCODONE HCL 5 MG TABLET 80.905595 7 [id number] 03/26/2018 3786865 N [id number] 85.71  03/19/2018 OXYCODONE HCL 5 MG TABLET 80.744957 7 [id number] 03/14/2018 1129980 N [id number] 85.71  03/12/2018 OXYCODONE HCL 5 MG TABLET 90.403933 8 [id number] 03/12/2018 9150965 N [id number] 84.38  03/05/2018 OXYCODONE HCL 5 MG TABLET 80.289295 8 [id number] 03/05/2018 961884 N [id number] 75.0  02/26/2018 OXYCODONE HCL 5 MG TABLET 80.104973 7 [id number] 02/26/2018 231120 N [id number] 85.71  02/19/2018 OXYCODONE HCL 5 MG TABLET 90.970030 8 [id number] 02/19/2018 7072509 N [id number] 84.38  02/12/2018 OXYCODONE HCL 5 MG TABLET 90.470367 8 [id number] 02/09/2018 7149455 N [id number] 84.38  02/05/2018 OXYCODONE HCL 5 MG TABLET 90.112958 8 [id number] 02/05/2018 9887314 N [id number] 84.38  01/29/2018 OXYCODONE HCL 5 MG TABLET 90.268285 8 [id number] 01/26/2018 8469898 N [id number] 84.38  01/24/2018 OXYCODONE HCL 5 MG TABLET 60.659378 5 [id number]  01/24/2018 8880722 N [id number] 90.0  01/17/2018 OXYCODONE HCL 5 MG TABLET 60.053626 5 [id number] 01/17/2018 133118 N [id number] 90.0  01/11/2018 OXYCODONE HCL 5 MG TABLET 60.184360 5 [id number] 01/09/2018 986766 N [id number] 90.0  **Per CDC guidance, the conversion factors and associated daily morphine milligram equivalents for drugs prescribed as part of  medication-assisted treatment for opioid use disorder should not be used to benchmark against dosage thresholds meant for opioids  prescribed for pain.  Report Disclaimers:  The report provided above is based upon the search criteria and the data provided by the dispensing entities. For more information  about any prescription, please contact the dispenser or the prescriber.  This report contains confidential information, including patient identifiers, and is not a public record. The information on this  report must be treated as protected health information and is to be disclosed to others only as authorized by applicable state  and Federal regulations.    Assessment:   Govind SantosCheyanneBehrends is a 53 y.o. female seen in clinic today for   Chief Complaint   Patient presents with     Ankle Pain      Patient presents the office today to follow-up on her chronic pain management treatment plan.  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  Patient does present in a walking boot today cast states this was just changed this week and now she is able to walk on her left foot but does express that it is difficult due to the increase in pain.  Currently we will stay the current regimen of 6 times per day considering a crease at the next visit which is in 2-4 weeks patient knows to continue following up with Dr. Ramirez as needed today she asked for refill in the medications as she  reports that they were not sent to the pharmacy last week after reviewing his note it does appear he wants her to continue the Wellbutrin, Lamictal, and Cymbalta at increased dose at 80 mg for 2 weeks    Per Dr. Romero note.     She did try the duloxetine increasing up to she believes 3 of the 20 mg tablets daily.  Did not have side effects and continue with panic attacks.  She is aware the was a refill.  She will increase it to 80 mg daily for 2 weeks, if needed to 100 mg daily.  Discussed its goal to help blocked the anxiety attacks and help with mood.  We will then changed to 60 mg tablets.     She did not  the prescription for lamotrigine.  Reviewed this may be helpful for depression and pain with a different mechanism of action and she will pick this up to start taking as directed.  (update- patient did fill this and is now taking.)     Patients current MME is 94  Patient to call clinic for next month's prescription 5 days in advance. Based on the patients response to their previous narcotic therapy and quality of life, which includes their participation in ADLs, I recommend that the narcotics therapy continue, however the changes listed below will be incorporated into their plan of care.     Patient set goals to   1. To continue cares with the ongoing pain that is related to her ongoing left ankle pain for the past 2 years which is residual from a non-union and repetitive surgical interventions.      Plan:   Interventions-    Follow up in 2-4 weeks to evaluate the effectiveness of the treatment interventions ordered today.     Ok to try the CBD oil     Please bring any opioids or controlled substances in that are prescribed by the clinic for pill counts for every visit.     Per Dr. Ramirez note- continue the use of Wellbutrin 300 mg daily , increase the Cymbalta to 80 mg for 2 weeks ( re--evaluate at that time to determine an increase or decrease in the Cymbalta) continue the use of Lamictal as you  are      Pain management- continue the use of the dilaudid today of 4 mg tabs up to 6 per day due to the increase in activity for now as you are now walking on your ankle fusion.     Continue the use of the gabapentin as you are 600 mg three times a day     Prescription Drug Management will be continued by the [facility]  A narcotic contract was signed by the patient and  Patient is unable to get narcotics from other providers. They will be subject to random UAs.      Orders placed today  Medications that were ordered today   Requested Prescriptions     Signed Prescriptions Disp Refills     HYDROmorphone (DILAUDID) 4 MG tablet 180 tablet 0     Sig: Take 1-2 tablets (4-8 mg total) by mouth 3 (three) times a day as needed (max of 6 tabs per day).     gabapentin (NEURONTIN) 300 MG capsule 180 capsule 1     Sig: TAKE 2 CAPSULES(600 MG) BY MOUTH THREE TIMES DAILY, this is an increase     DULoxetine (CYMBALTA) 20 MG capsule 90 capsule 0     Sig: Take 1 capsule (20 mg total) by mouth 3 (three) times a day.     buPROPion (WELLBUTRIN XL) 300 MG 24 hr tablet 30 tablet 0     Sig: Take 1 tablet (300 mg total) by mouth daily.     No orders of the defined types were placed in this encounter.      UDT/SWAB:  Patient required a random Urine Drug Testing, due to the need to comply with [place] [employer] Policy Guidelines and CDC Guideline for the use of any controlled substances. This is to ensure that patient is compliant with treatment, and monitor for risks such as diversion, abuse, or any other aberrant behaviors. Patient is either being considered for or taking a controlled substance. Unexpected findings will be discussed and treatment decision may be adjusted. Testing is being implemented across the board randomly w/o bias related to age, race, gender, socioeconomic status or [place] affiliation.    The patient understand todays plan and has their questions answered in regards to expectations and current  treatment plan.     SAFETY REMINDERS  No alcohol while taking controlled substances. Alcohol is not an illegal substance, it is unsafe to use in combination. It is a build up of substances in the body that can be extremely hazardous and may cause respirations to slow to a dangerous rate resulting in hospitalization, brain damage, or death.    Opioid medications have been associated with sharp rise in unintentional overdose and death.  Overdose is a condition characterized by the consumption in excess of a particular drug causing adverse effects. This can happen b/c you are sick, accidentally or intentionally took an extra dose, are on multiple medication that can interact. Someone took your medication and they are not use to the medication.  Symptoms of overdose include:   !breathing slow and shallow, erratic or not at all  !pinpoint pupils, hallucinations  !confusion  !muscle jerks, slack muscles   !extreme sleepiness or loss of alertness   !awake but not able to talk   !face pale or clammy, vomiting, for lighter skinned people, the skin tone turns bluish purple, for darker skinned people, it turns grayish or ashen   If in a situation where overdose is a concern engage the emergency response system (dial 911).    In one study it was noted that 80% of unintentional overdoses occurred in people who were taking a combination of opioids and benzodiazepines.    Do not sell, loan, borrow or share your opioid medication with anyone. Deaths have occurred as a result of this practice. It is illegal and patients are being prosecuted.     Prevent unexpected access/loss of medication: Keep medication locked. Only carry what you need with you.    Celeste Perales, [facility]  [address]  [place] [address]  Ph: [phone]  Fax: [phone]

## 2021-06-21 NOTE — PROGRESS NOTES
PAIN CLINIC FOLLOW UP PROGRESS NOTE    CC:  Chief Complaint   Patient presents with     Ankle Pain       HPI  Jaime L Franklin-Behrends is a 53 y.o. female who presents for evaluation   Chief Complaint   Patient presents with     Ankle Pain    that is causing continued pain. Since the last visit the patient denies any trips to the urgent care or ED specifically for their pain. The patient denies any new medications, diagnoses since the last visit. Specific questions indicated the patient wanted addressed today include: left ankle pain that is requiring long acting pain medication for a recent re-operation of the left ankle    Major issues:  1. Chronic pain syndrome    2. Postoperative pain    3. Status post ankle fusion    4. Status post hardware removal    5. S/P bone graft      Today the pain is located in their left foot and is described as throbbing, burning and shooting when it is aggravated it lasts for constant , and is rated at a 6  on a scale of 1-10, this is an  The same. Following along with her podiatrist.       Associated symptoms: Denies any loss of bladder control, fevers/chills, unintentional weight loss, weakness, numbness or pain that interferes with sleep.     Aggravating factors include: increased activity and surgery   Alleviating factors: pain medications and ice with elevation  Adverse effects of medications: NONE   Functional symptoms: affects her in every way on daily bases due to the acute on chronic pain in her left ankle.  Current subjective treatment efficacy: Poor    Previous Medical History  Social History     Substance and Sexual Activity   Alcohol Use Yes    Comment: socially none per pt     Social History     Substance and Sexual Activity   Drug Use Yes     Types: Marijuana     Social History     Tobacco Use   Smoking Status Current Every Day Smoker     Packs/day: 0.50     Years: 25.00     Pack years: 12.50   Smokeless Tobacco Never Used   Tobacco Comment    3 cigs/d still trying to  "quit       Pertinent Pain Medications/interventions:  She currently takes dilaudid 4 mg up to 6 per day and gabapentin     Never got started on ketamine due to the cost     Review of Systems:  12 point systems were reviewed with pt as documented on pt health form and the patient denies any new diagnosis or changes in 12 point system review since the last visit.     Physical Exam  Vitals:    11/20/18 0845   BP: 127/90   Patient Site: Right Arm   Patient Position: Sitting   Cuff Size: Adult Regular   Pulse: (!) 104   Resp: 18   Weight: 186 lb (84.4 kg)   Height: 5' 7\" (1.702 m)     General- patient is alert and oriented, in NAD, well-groomed, well-nourished  Psych- Judgment and insight normal, AOx4, recent and remote memory normal, mood and affect normal  Eyes- pupils are equal and reactive, conjunctiva is clear bilaterally, no ptosis is noted.   Respiratory- breathing is non-labored  Cardiovascular- extremities warm and well perfused, no peripheral edema or varicosities.  Musculoskeletal- gait is normal, extremities with no joint swelling, erythema, or warmth. Left ankle pain in a walking boot   Neuro- normal strength, no gait abnormalities, normal sensation to pain, temperature, light touch.  Integumentary- no rashes, dermatitis or discolorations noted throughout, no open wounds noted.    Medications    Current Outpatient Medications:      amLODIPine (NORVASC) 10 MG tablet, TK 1 T PO  q day, Disp: , Rfl: 0     aspirin 325 MG tablet, Take 1 tablet (325 mg total) by mouth 2 (two) times a day with meals., Disp: , Rfl: 0     buPROPion (WELLBUTRIN XL) 300 MG 24 hr tablet, Take 1 tablet (300 mg total) by mouth daily., Disp: 30 tablet, Rfl: 0     docusate sodium (COLACE) 100 MG capsule, Take 1 capsule (100 mg total) by mouth 2 (two) times a day., Disp: , Rfl: 0     DULoxetine (CYMBALTA) 60 MG capsule, Two tabs in morning, Disp: 60 capsule, Rfl: 2     gabapentin (NEURONTIN) 300 MG capsule, Take 1 capsule (300 mg total) by " mouth 3 (three) times a day. TAKE 2 CAPSULES(600 MG) BY MOUTH THREE TIMES DAILY, this is an increase, Disp: 180 capsule, Rfl: 1     lamoTRIgine (LAMICTAL) 25 MG tablet, Take 2 tablets (50 mg total) by mouth 2 (two) times a day., Disp: 120 tablet, Rfl: 0     varenicline (CHANTIX) 1 mg tablet, Take 1 tab by mouth two times a day. Take after eating with a full glass of water. NOTE: Dispense as maintenance for refills only., Disp: 60 tablet, Rfl: 2     acetaminophen (TYLENOL EXTRA STRENGTH) 500 MG tablet, Take 2 tablets (1,000 mg total) by mouth 3 (three) times a day., Disp: 180 tablet, Rfl: 0     [START ON 2018] HYDROmorphone (DILAUDID) 4 MG tablet, Take 1-2 tablets (4-8 mg total) by mouth 3 (three) times a day as needed (max of 6 tabs per day)., Disp: 84 tablet, Rfl: 0     [START ON 2018] HYDROmorphone (DILAUDID) 4 MG tablet, Take 1-2 tablets (4-8 mg total) by mouth 3 (three) times a day as needed for pain (max of 6 per day)., Disp: 84 tablet, Rfl: 0    Lab:  Last UDS on 3/22/2018 had expected results. Any abnormal results have been discussed with the patient and may change the plan of care. Please see the scanned document from the outside lab under the media tab. This was reviewed with the patient.      Imaging:  No new imaging.      Recent   Dated 2018 was reviewed with the patient today.   [facility] [place] Date: 18  Query Report Page#: 1  Patient Rx History Report  YOMI AGUSTIN  Search Criteria: Last Name 'yomi' and First Name 'Govind' and  = '67' and Request Period = '17' to  '18' - 3 out of 3 Recipients Selected.  Fill Date Product, Str, Form Qty Days Pt ID Prescriber Written RX# N/R* Pharm **MED+  ---------- -------------------------------- ------------ ---- --------- ---------- ---------- ------------ ----- --------- ------  2018 HYDROMORPHONE 4 MG TABLET 84.017226 14 [id number] 2018 [id number] N [id number] 96.0  2018 GABAPENTIN  300 MG CAPSULE 180.900875 30 [id number] 10/16/2018 017884 N [id number] 00.0  10/24/2018 HYDROMORPHONE 4 MG TABLET 84.915642 14 [id number] 10/16/2018 537755 N [id number] 96.0  10/11/2018 HYDROMORPHONE 4 MG TABLET 84.380355 14 [id number] 10/08/2018 750480 N [id number] 96.0  09/26/2018 GABAPENTIN 300 MG CAPSULE 180.534985 30 [id number] 08/17/2018 359918 R [id number] 00.0  09/15/2018 HYDROMORPHONE 4 MG TABLET 180.788339 30 [id number] 09/14/2018 501081 N [id number] 96.0  09/01/2018 GABAPENTIN 300 MG CAPSULE 180.730284 30 [id number] 08/17/2018 012837 N [id number] 00.0  08/17/2018 HYDROMORPHONE 4 MG TABLET 177.621317 30 [id number] 08/17/2018 265567 N [id number] 94.4  08/15/2018 GABAPENTIN 300 MG CAPSULE 90.776726 30 [id number] 06/15/2018 745649 N [id number] 00.0  08/13/2018 HYDROMORPHONE 4 MG TABLET 23.315607 4 [id number] 07/24/2018 102221 N [id number] 92.0  07/20/2018 GABAPENTIN 300 MG CAPSULE 180.140750 30 [id number] 07/20/2018 198950 N [id number] 00.0  07/20/2018 HYDROMORPHONE 4 MG TABLET 157.890912 27 [id number] 07/20/2018 415288 N [id number] 93.04  07/17/2018 OXYCODONE HCL 10 MG TABLET 34.019586 6 [id number] 07/17/2018 542374 N [id number] 85.0  07/10/2018 OXYCODONE HCL 10 MG TABLET 34.703276 8 [id number] 07/10/2018 9570806 N [id number] 63.75  07/05/2018 HYDROMORPHONE 8 MG TABLET 24.225928 16 [id number] 07/03/2018 068501 N [id number] 48.0  06/28/2018 HYDROMORPHONE 8 MG TABLET 23.507458 8 [id number] 06/28/2018 492234 N [id number] 92.0  06/26/2018 HYDROMORPHONE 4 MG TABLET 30.188623 5 [id number] 06/24/2018 634239 N [id number] 96.0  06/20/2018 HYDROMORPHONE 4 MG TABLET 42.960579 7 [id number] 06/15/2018 792305 N [id number] 96.0  06/13/2018 HYDROMORPHONE 4 MG TABLET 42.967467 7 [id number] 06/08/2018 727455 N [id number] 96.0  06/06/2018 HYDROMORPHONE 4 MG TABLET 42.510049 7 [id number]  05/30/2018 231063 N [id number] 96.0  05/30/2018 GABAPENTIN 300 MG CAPSULE 90.476501 30 [id number] 05/30/2018 230414 N [id number] 00.0  05/30/2018 HYDROMORPHONE 4 MG TABLET 42.313850 7 [id number] 05/30/2018 722078 N [id number] 96.0  05/22/2018 OXYCONTIN ER 15 MG TABLET 28.956182 14 [id number] 05/21/2018 347367 N [id number] 45.0  05/21/2018 OXYCODONE HCL 10 MG TABLET 42.528053 14 [id number] 05/21/2018 118991 N [id number] 45.0  05/21/2018 GABAPENTIN 300 MG CAPSULE 30.072070 30 [id number] 05/21/2018 363227 N [id number] 00.0  05/09/2018 HYDROCODONE-ACETAMIN 5-325 .535789 14 [id number] 04/23/2018 125912 N [id number] 40.0  04/25/2018 MORPHINE SULF ER 30 MG TABLET 60.274943 30 [id number] 04/23/2018 498011 N [id number] 60.0  04/25/2018 HYDROCODONE-ACETAMIN 5-325 .862918 14 [id number] 04/23/2018 962402 N [id number] 40.0  04/13/2018 HYDROCODONE-ACETAMIN 5-325 MG 96.501719 12 [id number] 04/13/2018 877913 N [id number] 40.0  04/13/2018 MORPHINE SULF ER 30 MG TABLET 24.438475 12 [id number] 04/13/2018 125008 N [id number] 60.0  04/09/2018 OXYCODONE HCL 5 MG TABLET 80.702515 7 [id number] 04/09/2018 153994 N [id number] 85.71  04/02/2018 OXYCODONE HCL 5 MG TABLET 80.418751 7 [id number] 04/02/2018 1116724 N [id number] 85.71  03/26/2018 OXYCODONE HCL 5 MG TABLET 80.770280 7 [id number] 03/26/2018 1015510 N [id number] 85.71  03/19/2018 OXYCODONE HCL 5 MG TABLET 80.456265 7 [id number] 03/14/2018 8480280 N [id number] 85.71  03/12/2018 OXYCODONE HCL 5 MG TABLET 90.999040 8 [id number] 03/12/2018 7625911 N [id number] 84.38  03/05/2018 OXYCODONE HCL 5 MG TABLET 80.411364 8 [id number] 03/05/2018 975205 N [id number] 75.0  02/26/2018 OXYCODONE HCL 5 MG TABLET 80.923380 7 [id number] 02/26/2018 161502 N [id number] 85.71  02/19/2018 OXYCODONE HCL 5 MG TABLET 90.074412 8 [id number] 02/19/2018 5187575 N [id number]  84.38  **Per CDC guidance, the conversion factors and associated daily morphine milligram equivalents for drugs prescribed as part of  medication-assisted treatment for opioid use disorder should not be used to benchmark against dosage thresholds meant for opioids  prescribed for pain.  Report Disclaimers:  The report provided above is based upon the search criteria and the data provided by the dispensing entities. For more information  about any prescription, please contact the dispenser or the prescriber.  This report contains confidential information, including patient identifiers, and is not a public record. The information on this  report must be treated as protected health information and is to be disclosed to others only as authorized by applicable state  and Federal regulations.    Assessment:   Jaime L Franklin-Behrends is a 53 y.o. female seen in clinic today for   Chief Complaint   Patient presents with     Ankle Pain     Patient is reporting an increase in her life quality with the current pain controlled at a 6/10 and the help of her new anti-depression medication, she is also reporting that she is now driving and is gaining her independence back. Patient is making good eye contact and is quick in her responses today in the clinic, very engaged. 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     No aberrant behaviors noted today will send in 2 week scripts and have her follow up in 4 weeks. Currently the patient is mentally stable and making progress.     They are here for follow up and continued medical management of their pain. Today we reviewed the lab work of the UDT test and the results are expected because of the prescribed narcotics found in the urine drug screen.     I have also reviewed the information obtained from the last visit encounter after the FERNANDA was signed and have asked any pertintent questions needed from other healthcare  providers/family/patient to continue care and formulate a treatment plan.     Patients current MME is 96  Patient to call clinic for next month's prescription 5 days in advance. Based on the patients response to their previous narcotic therapy and quality of life, which includes their participation in ADLs, I recommend that the narcotics therapy continue, however the changes listed below will be incorporated into their plan of care.     Patient set goals to   1.to help her cope with her ongoing  Left ankle pain so that she can get her life back.     Plan:   Interventions-    Refills have been sent to your pharmacy today for the dilaudid- ok to juan f on 11/21/2018, 12/5 and should last you until 12/19    Follow up in 1 month.     Prescription Drug Management will be continued by the [facility]  A narcotic contract was signed by the patient and  Patient is unable to get narcotics from other providers. They will be subject to random UAs.      Orders placed today  Medications that were ordered today   Requested Prescriptions     Signed Prescriptions Disp Refills     HYDROmorphone (DILAUDID) 4 MG tablet 84 tablet 0     Sig: Take 1-2 tablets (4-8 mg total) by mouth 3 (three) times a day as needed (max of 6 tabs per day).     HYDROmorphone (DILAUDID) 4 MG tablet 84 tablet 0     Sig: Take 1-2 tablets (4-8 mg total) by mouth 3 (three) times a day as needed for pain (max of 6 per day).     No orders of the defined types were placed in this encounter.      UDT/SWAB:  Patient required a random Urine Drug Testing, due to the need to comply with [place] [employer] Policy Guidelines and CDC Guideline for the use of any controlled substances. This is to ensure that patient is compliant with treatment, and monitor for risks such as diversion, abuse, or any other aberrant behaviors. Patient is either being considered for or taking a controlled substance. Unexpected findings will be discussed and treatment decision may be  adjusted. Testing is being implemented across the board randomly w/o bias related to age, race, gender, socioeconomic status or Yarsanism affiliation.    The patient understand todays plan and has their questions answered in regards to expectations and current treatment plan.     SAFETY REMINDERS  No alcohol while taking controlled substances. Alcohol is not an illegal substance, it is unsafe to use in combination. It is a build up of substances in the body that can be extremely hazardous and may cause respirations to slow to a dangerous rate resulting in hospitalization, brain damage, or death.    Opioid medications have been associated with sharp rise in unintentional overdose and death.  Overdose is a condition characterized by the consumption in excess of a particular drug causing adverse effects. This can happen b/c you are sick, accidentally or intentionally took an extra dose, are on multiple medication that can interact. Someone took your medication and they are not use to the medication.  Symptoms of overdose include:   !breathing slow and shallow, erratic or not at all  !pinpoint pupils, hallucinations  !confusion  !muscle jerks, slack muscles   !extreme sleepiness or loss of alertness   !awake but not able to talk   !face pale or clammy, vomiting, for lighter skinned people, the skin tone turns bluish purple, for darker skinned people, it turns grayish or ashen   If in a situation where overdose is a concern engage the emergency response system (dial 911).    In one study it was noted that 80% of unintentional overdoses occurred in people who were taking a combination of opioids and benzodiazepines.    Do not sell, loan, borrow or share your opioid medication with anyone. Deaths have occurred as a result of this practice. It is illegal and patients are being prosecuted.     Prevent unexpected access/loss of medication: Keep medication locked. Only carry what you need with you.    Celeste Perales,  [facility]  [address]  [place] [address]  Ph: [phone]  Fax: [phone]

## 2021-06-21 NOTE — PROGRESS NOTES
PAIN CLINIC FOLLOW UP PROGRESS NOTE    CC:  Chief Complaint   Patient presents with     Ankle Pain       HPI  Jaime L Franklin-Behrends is a 53 y.o. female who presents for evaluation   Chief Complaint   Patient presents with     Ankle Pain    that is causing continued pain. Since the last visit the patient denies any trips to the urgent care or ED specifically for their pain. The patient denies any new medications, diagnoses since the last visit. Specific questions indicated the patient wanted addressed today include: to follow up on her chronic pain in her left foot. Reports that she has enough medication to make it until Wednesday     Major issues:  1. Chronic pain syndrome    2. Postoperative pain    3. Chronic, continuous use of opioids      Today the pain is located in their left foot and is described as throbbing, burning and shooting when it is aggravated it lasts for constant , and is rated at a 6  on a scale of 1-10, this is an increase since the last visit. Patient notes that the pain started to increase when the cast was removed and the walking boot was placed.       Associated symptoms: Denies any loss of bladder control, fevers/chills, unintentional weight loss, weakness, numbness or pain that interferes with sleep.     Aggravating factors include: increased activity and surgery   Alleviating factors: pain medications and ice with elevation  Adverse effects of medications: NONE   Functional symptoms: affects her in every way on daily bases due to the acute on chronic pain in her left ankle.  Current subjective treatment efficacy: Poor      Previous Medical History  History   Alcohol Use     Yes     Comment: socially none per pt     History   Drug Use     Yes     Special: Marijuana     History   Smoking Status     Current Every Day Smoker     Packs/day: 0.50     Years: 25.00   Smokeless Tobacco     Never Used     Comment: 3 cigs/d still trying to quit       Pertinent Pain Medications/interventions:  She  "currently takes  Dilaudid 4 mg and gabapentin 300 mg And is supposed to get the ketamine    Review of Systems:  12 point systems were reviewed with pt as documented on pt health form and the patient denies any new diagnosis or changes in 12 point system review since the last visit. none    Physical Exam  Vitals:    11/05/18 0812   BP: 125/87   Patient Site: Left Arm   Patient Position: Sitting   Cuff Size: Adult Regular   Pulse: 90   Resp: 16   Weight: 186 lb (84.4 kg)   Height: 5' 7\" (1.702 m)     General- patient is alert and oriented, in NAD, well-groomed, well-nourished  Psych- Judgment and insight normal, AOx4, recent and remote memory normal, mood and affect normal  Eyes- pupils are equal and reactive, conjunctiva is clear bilaterally, no ptosis is noted.   Respiratory- breathing is non-labored  Cardiovascular- extremities warm and well perfused, no peripheral edema or varicosities.  Musculoskeletal- gait is normal, extremities with no joint swelling, erythema, or warmth. Walking boot on the left foot   Neuro- normal strength, no gait abnormalities, normal sensation to pain, temperature, light touch.  Integumentary- no rashes, dermatitis or discolorations noted throughout, no open wounds noted.    Medications    Current Outpatient Prescriptions:      acetaminophen (TYLENOL EXTRA STRENGTH) 500 MG tablet, Take 2 tablets (1,000 mg total) by mouth 3 (three) times a day., Disp: 180 tablet, Rfl: 0     amLODIPine (NORVASC) 10 MG tablet, TK 1 T PO  q day, Disp: , Rfl: 0     ascorbic acid, vitamin C, (VITAMIN C) 1000 MG tablet, Take 1 tablet (1,000 mg total) by mouth daily., Disp: 30 tablet, Rfl: 4     aspirin 325 MG tablet, Take 1 tablet (325 mg total) by mouth 2 (two) times a day with meals., Disp: , Rfl: 0     buPROPion (WELLBUTRIN XL) 300 MG 24 hr tablet, Take 1 tablet (300 mg total) by mouth daily., Disp: 30 tablet, Rfl: 0     docusate sodium (COLACE) 100 MG capsule, Take 1 capsule (100 mg total) by mouth 2 (two) " times a day., Disp: , Rfl: 0     DULoxetine (CYMBALTA) 20 MG capsule, Take 2 capsules (40 mg total) by mouth 2 (two) times a day., Disp: 120 capsule, Rfl: 0     ergocalciferol (VITAMIN D2) 50,000 unit capsule, Take 1 capsule (50,000 Units total) by mouth every 7 days., Disp: 4 capsule, Rfl: 4     gabapentin (NEURONTIN) 300 MG capsule, Take 1 capsule (300 mg total) by mouth 3 (three) times a day. TAKE 2 CAPSULES(600 MG) BY MOUTH THREE TIMES DAILY, this is an increase, Disp: 180 capsule, Rfl: 1     lamoTRIgine (LAMICTAL) 25 MG tablet, Take 2 tablets (50 mg total) by mouth 2 (two) times a day., Disp: 120 tablet, Rfl: 0     senna-docusate (SENEXON-S) 8.6-50 mg tablet, Take 1 tablet by mouth 3 (three) times a day., Disp: 90 tablet, Rfl: 0     varenicline (CHANTIX STARTING MONTH BOX) 0.5 mg (11)- 1 mg (42) tablet, 1 wk before you stop smoking take 0.5mg daily on days 1-3, 0.5mg 2 times each day on days 4-7, then 1mg 2 times daily., Disp: 53 tablet, Rfl: 0     varenicline (CHANTIX) 1 mg tablet, Take 1 tab by mouth two times a day. Take after eating with a full glass of water. NOTE: Dispense as maintenance for refills only., Disp: 60 tablet, Rfl: 2     [START ON 11/7/2018] HYDROmorphone (DILAUDID) 4 MG tablet, Take 1-2 tablets (4-8 mg total) by mouth 3 (three) times a day as needed (max of 6 tabs per day)., Disp: 84 tablet, Rfl: 0    Lab:  Last UDS on 4/13/2018 had expected results. Any abnormal results have been discussed with the patient and may change the plan of care. Please see the scanned document from the outside lab under the media tab. This was reviewed with the patient.      Imaging:  No new imaging.      Recent   Dated 11/5/2018 was reviewed with the patient today.   Paper copy reviewed    Assessment:   Jaime L Franklin-Behrends is a 53 y.o. female seen in clinic today for   Chief Complaint   Patient presents with     Ankle Pain   Patient reports that she has enough medication to last until Wednesday which is  when she is due. Will provide refills today in the office for the dilaudid. Patient reports that she does not need a refill of the gabapentin as of yet and needs her Lamictal and Cymbalta updated with the recent changes made by Dr. Ramirez.     Otherwise the patient reports that she was very anxious coming in today as she drove herself and this is a big change for her. Patient also is scheduled to [facility], she is still in a walking boot on the LLE.      They are here for follow up and continued medical management of their pain. Today we reviewed the lab work of the UDT test and the results are expected because of the prescribed narcotics found in the urine drug screen.     I have also reviewed the information obtained from the last visit encounter after the FERNANDA was signed and have asked any pertintent questions needed from other healthcare providers/family/patient to continue care and formulate a treatment plan.     Patients current MME is 96  Patient to call clinic for next month's prescription 5 days in advance. Based on the patients response to their previous narcotic therapy and quality of life, which includes their participation in ADLs, I recommend that the narcotics therapy continue, however the changes listed below will be incorporated into their plan of care.     Patient set goals to   1. To continue managing foot pain as she heals    Plan:   Interventions-    Follow up in 2 weeks to evaluate the effectiveness of the treatment interventions ordered today.     Please bring any opioids or controlled substances in that are prescribed by the clinic for pill counts for every visit.     Refills have been sent to your pharmacy on file ok to  meds on 11/7/2018     Updated the orders for Cymbalta and Lamictal per Dr. Romero note for the patient.     Prescription Drug Management will be continued by the [facility]  A narcotic contract was signed by the patient  and  Patient is unable to get narcotics from other providers. They will be subject to random UAs.      Orders placed today  Medications that were ordered today   Requested Prescriptions     Signed Prescriptions Disp Refills     HYDROmorphone (DILAUDID) 4 MG tablet 84 tablet 0     Sig: Take 1-2 tablets (4-8 mg total) by mouth 3 (three) times a day as needed (max of 6 tabs per day).     DULoxetine (CYMBALTA) 20 MG capsule 120 capsule 0     Sig: Take 2 capsules (40 mg total) by mouth 2 (two) times a day.     lamoTRIgine (LAMICTAL) 25 MG tablet 120 tablet 0     Sig: Take 2 tablets (50 mg total) by mouth 2 (two) times a day.     No orders of the defined types were placed in this encounter.      UDT/SWAB:  Patient required a random Urine Drug Testing, due to the need to comply with [facility] Model Policy Guidelines and CDC Guideline for the use of any controlled substances. This is to ensure that patient is compliant with treatment, and monitor for risks such as diversion, abuse, or any other aberrant behaviors. Patient is either being considered for or taking a controlled substance. Unexpected findings will be discussed and treatment decision may be adjusted. Testing is being implemented across the board randomly w/o bias related to age, race, gender, socioeconomic status or [employer] affiliation.    The patient understand todays plan and has their questions answered in regards to expectations and current treatment plan.     SAFETY REMINDERS  No alcohol while taking controlled substances. Alcohol is not an illegal substance, it is unsafe to use in combination. It is a build up of substances in the body that can be extremely hazardous and may cause respirations to slow to a dangerous rate resulting in hospitalization, brain damage, or death.    Opioid medications have been associated with sharp rise in unintentional overdose and death.  Overdose is a condition characterized by the consumption in excess of a particular  drug causing adverse effects. This can happen b/c you are sick, accidentally or intentionally took an extra dose, are on multiple medication that can interact. Someone took your medication and they are not use to the medication.  Symptoms of overdose include:   !breathing slow and shallow, erratic or not at all  !pinpoint pupils, hallucinations  !confusion  !muscle jerks, slack muscles   !extreme sleepiness or loss of alertness   !awake but not able to talk   !face pale or clammy, vomiting, for lighter skinned people, the skin tone turns bluish purple, for darker skinned people, it turns grayish or ashen   If in a situation where overdose is a concern engage the emergency response system (dial 911).    In one study it was noted that 80% of unintentional overdoses occurred in people who were taking a combination of opioids and benzodiazepines.    Do not sell, loan, borrow or share your opioid medication with anyone. Deaths have occurred as a result of this practice. It is illegal and patients are being prosecuted.     Prevent unexpected access/loss of medication: Keep medication locked. Only carry what you need with you.    Celeste Perales, [facility]  [address]  [place] [address]  Ph: [phone]  Fax: [phone]

## 2021-06-21 NOTE — PROGRESS NOTES
"Patient reports she is \"wonderful\".  Describes over the last couple weeks she has woken up and is not crying.  She feels happy.  Her agoraphobia is resolved and she can go places.  Has not felt this well in years.    She has been taking the Cymbalta 20 mg 2 tablets twice a day, and lamotrigine 25 mg 2 in the morning 1 at bedtime.  She did not take the Seroquel.  Has not been using ketamine as she cannot afford that.    She continues in a walking boot, her last appointment was canceled and she is rescheduled next week to assess this.    We had discussed last time a going to psychotherapy, she does not need this.  She reviews stressors, her 21-year-old daughter is starting to make some positive steps for independence.  Her  continues drinking and he is out of the home and she is set limits.    She reviews her goals, are to prepare to sell her house that she is ready to downsize.  She would like to get back to work after more resolution about her foot.  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      Current Outpatient Medications:      amLODIPine (NORVASC) 10 MG tablet, TK 1 T PO  q day, Disp: , Rfl: 0     aspirin 325 MG tablet, Take 1 tablet (325 mg total) by mouth 2 (two) times a day with meals., Disp: , Rfl: 0     buPROPion (WELLBUTRIN XL) 300 MG 24 hr tablet, Take 1 tablet (300 mg total) by mouth daily., Disp: 30 tablet, Rfl: 0     docusate sodium (COLACE) 100 MG capsule, Take 1 capsule (100 mg total) by mouth 2 (two) times a day., Disp: 60 capsule, Rfl: 3     HYDROmorphone (DILAUDID) 4 MG tablet, Take 1-2 tablets (4-8 mg total) by mouth 3 (three) times a day as needed (max of 6 tabs per day)., Disp: 84 tablet, Rfl: 0     [START ON 12/5/2018] HYDROmorphone (DILAUDID) 4 MG tablet, Take 1-2 tablets (4-8 mg total) by mouth 3 (three) times a day as needed for pain (max of 6 per day)., Disp: 84 tablet, Rfl: 0     lamoTRIgine (LAMICTAL) 25 MG tablet, Take 2 tablets (50 mg total) by mouth 2 (two) times a day., Disp: " "120 tablet, Rfl: 0     varenicline (CHANTIX) 1 mg tablet, Take 1 tab by mouth two times a day. Take after eating with a full glass of water. NOTE: Dispense as maintenance for refills only., Disp: 60 tablet, Rfl: 2     acetaminophen (TYLENOL EXTRA STRENGTH) 500 MG tablet, Take 2 tablets (1,000 mg total) by mouth 3 (three) times a day., Disp: 180 tablet, Rfl: 0     [START ON 11/30/2018] DULoxetine (CYMBALTA) 60 MG capsule, Take 1 capsule (60 mg total) by mouth 3 (three) times a day., Disp: 90 capsule, Rfl: 2  Blood pressure (!) 136/99, pulse (!) 104, resp. rate (!) 161, height 5' 7\" (1.702 m), weight 186 lb (84.4 kg), not currently breastfeeding.  Pain score 1.  She is alert with a clear sensorium good eye contact.  Thought process tight and logical.  Speech is normal rate and rhythm.  Affect is full range.  Appropriately groomed.  She is quite pleased with the progress she has made.      Assessment: Chronic pain relates to left lower extremity pain with multiple surgeries.  On opioids as above followed by Celeste Perales NP.    There is been significant agorophobia missed appointments and depression.  She is clearly having a significant improvement in that regard.    Plan: We will continue with the duloxitine and lamotrigine as above.  She will call if there are problems to adjust this to be seen back again in 2 months for monitoring.    Time spent more than 15 minutes face-to-face 50% count about above condition coronation treatment plan    Addendum: Later in the day it was discovered she has been taking Cymbalta 60 mg 2 tablets twice a day.  The insurance was no longer covering the 20 mg 4 tablets a day  and I recommended changing to 60 mg twice a day.  That message was not conveyed to the patient through our staff her pharmacy.  She is clearly tolerating this dose and responded well.  Reviewed my concern with the patient that there may be some possible side effects long-term at this dose, and the insurance may " not likely cover this.  Reviewed given that she had been taking this increased dose for the last 2-3 weeks, noting increased fairly quick quickly, felt I could reassure her that she will continue with the benefits if we decrease the dose to 60 mg 3 times a day and see if insurance will cover that. She indicates understanding we will monitor

## 2021-06-21 NOTE — PROGRESS NOTES
PAIN CLINIC FOLLOW UP PROGRESS NOTE    CC:  Chief Complaint   Patient presents with     Ankle Pain       HPI  Jaime L Franklin-Behrends is a 53 y.o. female who presents for evaluation   Chief Complaint   Patient presents with     Ankle Pain    that is causing continued pain. Since the last visit the patient denies any trips to the urgent care or ED specifically for their pain. The patient denies any new medications, diagnoses since the last visit. Specific questions indicated the patient wanted addressed today include: to follow up on her chronic pain that is related to her left ankle has had multiple surgical interventions.     Major issues:  1. Chronic pain syndrome    2. Postoperative pain    3. Status post ankle fusion    4. Status post hardware removal    5. S/P bone graft      Today the pain is located in their left foot and is described as throbbing, burning and shooting when it is aggravated it lasts for constant , and is rated at a 8  on a scale of 1-10, this is an increase since the last visit. Patient notes that the pain started to increase when the cast was removed and the walking boot was placed.       Associated symptoms: Denies any loss of bladder control, fevers/chills, unintentional weight loss, weakness, numbness or pain that interferes with sleep.     Aggravating factors include: increased activity and surgery   Alleviating factors: pain medications and ice with elevation  Adverse effects of medications: NONE   Functional symptoms: affects her in every way on daily bases due to the acute on chronic pain in her left ankle, using a scooter to mobilize   Current subjective treatment efficacy: Poor    Previous Medical History  History   Alcohol Use     Yes     Comment: socially none per pt     History   Drug Use     Yes     Special: Marijuana     History   Smoking Status     Current Every Day Smoker     Packs/day: 0.50     Years: 25.00   Smokeless Tobacco     Never Used     Comment: 3 cigs/d still  "trying to quit       Pertinent Pain Medications/interventions:  She currently takes dilaudid 4 mg three times a day prn up to 6 per day as well as the gabapentin, lamictal, ketamine and cannabis.     Review of Systems:  12 point systems were reviewed with pt as documented on pt health form and the patient denies any new diagnosis or changes in 12 point system review since the last visit.     Physical Exam  Vitals:    10/22/18 0751   BP: (!) 140/111   Patient Site: Right Arm   Patient Position: Sitting   Cuff Size: Adult Regular   Pulse: 87   Resp: 16   Weight: 186 lb (84.4 kg)   Height: 5' 7\" (1.702 m)     General- patient is alert and oriented, in NAD, well-groomed, well-nourished  Psych- Judgment and insight normal, AOx4, recent and remote memory normal, mood and affect normal  Eyes- pupils are equal and reactive, conjunctiva is clear bilaterally, no ptosis is noted.   Respiratory- breathing is non-labored  Cardiovascular- extremities warm and well perfused, no peripheral edema or varicosities.  Musculoskeletal- gait is normal, extremities with no joint swelling, erythema, or warmth. Left foot in a walking boot.   Neuro- normal strength, no gait abnormalities, normal sensation to pain, temperature, light touch.  Integumentary- no rashes, dermatitis or discolorations noted throughout, no open wounds noted.    Medications    Current Outpatient Prescriptions:      acetaminophen (TYLENOL EXTRA STRENGTH) 500 MG tablet, Take 2 tablets (1,000 mg total) by mouth 3 (three) times a day., Disp: 180 tablet, Rfl: 2     acetaminophen (TYLENOL EXTRA STRENGTH) 500 MG tablet, Take 2 tablets (1,000 mg total) by mouth 3 (three) times a day., Disp: 180 tablet, Rfl: 0     amLODIPine (NORVASC) 10 MG tablet, TK 1 T PO  q day, Disp: , Rfl: 0     ascorbic acid, vitamin C, (VITAMIN C) 1000 MG tablet, Take 1 tablet (1,000 mg total) by mouth daily., Disp: 30 tablet, Rfl: 4     aspirin 325 MG tablet, Take 1 tablet (325 mg total) by mouth 2 " (two) times a day with meals., Disp: , Rfl: 0     buPROPion (WELLBUTRIN XL) 300 MG 24 hr tablet, Take 1 tablet (300 mg total) by mouth daily., Disp: 30 tablet, Rfl: 0     docusate sodium (COLACE) 100 MG capsule, Take 1 capsule (100 mg total) by mouth 2 (two) times a day., Disp: , Rfl: 0     DULoxetine (CYMBALTA) 20 MG capsule, Take 1 capsule (20 mg total) by mouth 3 (three) times a day., Disp: 90 capsule, Rfl: 0     ergocalciferol (VITAMIN D2) 50,000 unit capsule, Take 1 capsule (50,000 Units total) by mouth every 7 days., Disp: 4 capsule, Rfl: 4     [START ON 10/26/2018] gabapentin (NEURONTIN) 300 MG capsule, Take 1 capsule (300 mg total) by mouth 3 (three) times a day. TAKE 2 CAPSULES(600 MG) BY MOUTH THREE TIMES DAILY, this is an increase, Disp: 180 capsule, Rfl: 1     [START ON 10/24/2018] HYDROmorphone (DILAUDID) 4 MG tablet, Take 1-2 tablets (4-8 mg total) by mouth 3 (three) times a day as needed (max of 6 tabs per day)., Disp: 84 tablet, Rfl: 0     lamoTRIgine (LAMICTAL) 25 MG tablet, Take two tabs in am and one tab at bedtime, Disp: 90 tablet, Rfl: 1     senna-docusate (SENEXON-S) 8.6-50 mg tablet, Take 1 tablet by mouth 3 (three) times a day., Disp: 90 tablet, Rfl: 0     varenicline (CHANTIX STARTING MONTH BOX) 0.5 mg (11)- 1 mg (42) tablet, 1 wk before you stop smoking take 0.5mg daily on days 1-3, 0.5mg 2 times each day on days 4-7, then 1mg 2 times daily., Disp: 53 tablet, Rfl: 0     varenicline (CHANTIX) 1 mg tablet, Take 1 tab by mouth two times a day. Take after eating with a full glass of water. NOTE: Dispense as maintenance for refills only., Disp: 60 tablet, Rfl: 2     ketamine (KETALAR) 100 mg/mL nasal spray, One spray each nostril 20 minutes before leaving house, Disp: 30 mL, Rfl: 2    Lab:  Last UDS on 4/13/2018 had expected results. Any abnormal results have been discussed with the patient and may change the plan of care. Please see the scanned document from the outside lab under the media tab.  This was reviewed with the patient.      Imaging:  No new imaging.      Recent   Dated 10/22/2018 was reviewed with the patient today.   Reble Date: 10/17/18  Query Report Page#: 1  Patient Rx History Report  YOMI [facility]OOKER  Search Criteria: Last Name 'yomi' and First Name 'ivett' and  = ' and Request Period = '10/17/17' to  '10/17/18' - 2 out of 2 Recipients Selected.  Fill Date Product, Str, Form Qty Days Pt ID Prescriber Written RX# N/R* Pharm **MED+  ---------- -------------------------------- ------------ ---- --------- ---------- ---------- ------------ ----- --------- ------  10/11/2018 HYDROMORPHONE 4 MG TABLET 84.147041 14 [id number] 10/08/2018 [id number] N [id number] 96.0  2018 GABAPENTIN 300 MG CAPSULE 180.484066 30 [id number] 2018 [id number] R [id number] 00.0  09/15/2018 HYDROMORPHONE 4 MG TABLET 180.143648 30 [id number] 2018 114040 N [id number] 96.0  2018 GABAPENTIN 300 MG CAPSULE 180.689674 30 [id number] 2018 [id number] N [id number] 00.0  2018 HYDROMORPHONE 4 MG TABLET 177.180191 30 [id number] 2018 988713 N [id number] 94.4  08/15/2018 GABAPENTIN 300 MG CAPSULE 90.609801 30 [id number] 06/15/2018 893825 N [id number] 00.0  2018 HYDROMORPHONE 4 MG TABLET 23.514063 4 [id number] 2018 728449 N [id number] 92.0  2018 GABAPENTIN 300 MG CAPSULE 180.933565 30 [id number] 2018 629457 N [id number] 00.0  2018 HYDROMORPHONE 4 MG TABLET 157.617420 27 [id number] 2018 384766 N [id number] 93.04  2018 OXYCODONE HCL 10 MG TABLET 34.572520 6 [id number] 2018 869727 N [id number] 85.0  07/10/2018 OXYCODONE HCL 10 MG TABLET 34.241652 8 [id number] 07/10/2018 [id number] N [id number] 63.75  2018 HYDROMORPHONE 8 MG TABLET 24.510538 16 [id number] 2018 [id number] N [id number] 48.0  2018 HYDROMORPHONE 8 MG TABLET 23.627414 8  [id number] 06/28/2018 385605 N [id number] 92.0  06/26/2018 HYDROMORPHONE 4 MG TABLET 30.222909 5 [id number] 06/24/2018 006818 N [id number] 96.0  06/20/2018 HYDROMORPHONE 4 MG TABLET 42.583798 7 [id number] 06/15/2018 611542 N [id number] 96.0  06/13/2018 HYDROMORPHONE 4 MG TABLET 42.447787 7 [id number] 06/08/2018 155710 N [id number] 96.0  06/06/2018 HYDROMORPHONE 4 MG TABLET 42.009781 7 [id number] 05/30/2018 314927 N [id number] 96.0  05/30/2018 GABAPENTIN 300 MG CAPSULE 90.711151 30 [id number] 05/30/2018 483583 N [id number] 00.0  05/30/2018 HYDROMORPHONE 4 MG TABLET 42.789640 7 [id number] 05/30/2018 188000 N [id number] 96.0  05/22/2018 OXYCONTIN 15 MG TABLET 28.300964 14 [id number] 05/21/2018 828874 N [id number] 45.0  05/21/2018 OXYCODONE HCL 10 MG TABLET 42.622554 14 [id number] 05/21/2018 166485 N [id number] 45.0  05/21/2018 GABAPENTIN 300 MG CAPSULE 30.782958 30 [id number] 05/21/2018 373413 N [id number] 00.0  05/09/2018 HYDROCODONE-ACETAMIN 5-325 .691078 14 [id number] 04/23/2018 702975 N [id number] 40.0  04/25/2018 MORPHINE SULF ER 30 MG TABLET 60.569409 30 [id number] 04/23/2018 570436 N [id number] 60.0  04/25/2018 HYDROCODONE-ACETAMIN 5-325 .024244 14 [id number] 04/23/2018 135437 N [id number] 40.0  04/13/2018 HYDROCODONE-ACETAMIN 5-325 MG 96.882734 12 [id number] 04/13/2018 358446 N [id number] 40.0  04/13/2018 MORPHINE SULF ER 30 MG TABLET 24.938548 12 [id number] 04/13/2018 632356 N [id number] 60.0  04/09/2018 OXYCODONE HCL 5 MG TABLET 80.570598 7 [id number] 04/09/2018 223949 N [id number] 85.71  04/02/2018 OXYCODONE HCL 5 MG TABLET 80.691871 7 [id number] 04/02/2018 0459527 N [id number] 85.71  03/26/2018 OXYCODONE HCL 5 MG TABLET 80.876522 7 [id number] 03/26/2018 5769456 N [id number] 85.71  03/19/2018 OXYCODONE HCL 5 MG TABLET 80.899750 7 [id number] 03/14/2018 [id number] N  [id number] 85.71  03/12/2018 OXYCODONE HCL 5 MG TABLET 90.930203 8 [id number] 03/12/2018 8926797 N [id number] 84.38  03/05/2018 OXYCODONE HCL 5 MG TABLET 80.741409 8 [id number] 03/05/2018 607981 N [id number] 75.0  02/26/2018 OXYCODONE HCL 5 MG TABLET 80.833737 7 [id number] 02/26/2018 204556 N [id number] 85.71  02/19/2018 OXYCODONE HCL 5 MG TABLET 90.185599 8 [id number] 02/19/2018 8806158 N [id number] 84.38  02/12/2018 OXYCODONE HCL 5 MG TABLET 90.508243 8 [id number] 02/09/2018 1678377 N [id number] 84.38  02/05/2018 OXYCODONE HCL 5 MG TABLET 90.655759 8 [id number] 02/05/2018 5601899 N [id number] 84.38  01/29/2018 OXYCODONE HCL 5 MG TABLET 90.109955 8 [id number] 01/26/2018 [id number] N [id number] 84.38  **Per CDC guidance, the conversion factors and associated daily morphine milligram equivalents for drugs prescribed as part of  medication-assisted treatment for opioid use disorder should not be used to benchmark against dosage thresholds meant for opioids  prescribed for pain.  Report Disclaimers:  The report provided above is based upon the search criteria and the data provided by the dispensing entities. For more information  about any prescription, please contact the dispenser or the prescriber.  This report contains confidential information, including patient identifiers, and is not a public record. The information on this  report must be treated as protected health information and is to be disclosed to others only as authorized by applicable state  and Federal regulations.    Assessment:   Jaime L Franklin-Behrends is a 53 y.o. female seen in clinic today for   Chief Complaint   Patient presents with     Ankle Pain     Patient presents today to follow up on her chronic pain that she is having. Patient notes that she just saw Dr. Ramirez last week and he refilled her medications, patient notes that her spouse recently moved out after a disagreement over his  alcoholism. Patient states that he took some of her pills and now she is short 6 tabs. Patient states that she has 6 tabs left and will space them out over the next 2 days so she does not run out. Patient is interacting appropriately today and notes that she will get better.     No refills are needed today but will continue to see her every 2 weeks due to her high risk situation. She knows to call the clinic if she needs anything.     Patients current MME is 48-96  Patient to call clinic for next month's prescription 5 days in advance. Based on the patients response to their previous narcotic therapy and quality of life, which includes their participation in ADLs, I recommend that the narcotics therapy continue, however the changes listed below will be incorporated into their plan of care.     Patient set goals to   1. Reduce her need for opioids going forward as she heals.     Plan:   Interventions-  Follow up in 2 weeks to evaluate the effectiveness of the treatment interventions ordered today.     Agree with the [facility] classes     Please continue the dilaudid 4 mg up to 6 per day but for the next 2 days space out the 3 per day.     Please  the ketamine nasal spray this does help with pain at the [place] pharmacy     Continue the Cymbalta three times a day    Please continue to take the tylenol as you are.     Prescription Drug Management will be continued by the [facility]  A narcotic contract was signed by the patient and  Patient is unable to get narcotics from other providers. They will be subject to random UAs.      Orders placed today  Non- this was already sent to the pharmacy for  on 10/24- dilaudid        UDT/SWAB:  Patient required a random Urine Drug Testing, due to the need to comply with [place] [employer] Policy Guidelines and CDC Guideline for the use of any controlled substances. This is to ensure that patient is compliant with treatment, and monitor for risks such as  diversion, abuse, or any other aberrant behaviors. Patient is either being considered for or taking a controlled substance. Unexpected findings will be discussed and treatment decision may be adjusted. Testing is being implemented across the board randomly w/o bias related to age, race, gender, socioeconomic status or Hinduism affiliation.    The patient understand todays plan and has their questions answered in regards to expectations and current treatment plan.     SAFETY REMINDERS  No alcohol while taking controlled substances. Alcohol is not an illegal substance, it is unsafe to use in combination. It is a build up of substances in the body that can be extremely hazardous and may cause respirations to slow to a dangerous rate resulting in hospitalization, brain damage, or death.    Opioid medications have been associated with sharp rise in unintentional overdose and death.  Overdose is a condition characterized by the consumption in excess of a particular drug causing adverse effects. This can happen b/c you are sick, accidentally or intentionally took an extra dose, are on multiple medication that can interact. Someone took your medication and they are not use to the medication.  Symptoms of overdose include:   !breathing slow and shallow, erratic or not at all  !pinpoint pupils, hallucinations  !confusion  !muscle jerks, slack muscles   !extreme sleepiness or loss of alertness   !awake but not able to talk   !face pale or clammy, vomiting, for lighter skinned people, the skin tone turns bluish purple, for darker skinned people, it turns grayish or ashen   If in a situation where overdose is a concern engage the emergency response system (dial 911).    In one study it was noted that 80% of unintentional overdoses occurred in people who were taking a combination of opioids and benzodiazepines.    Do not sell, loan, borrow or share your opioid medication with anyone. Deaths have occurred as a result of this  practice. It is illegal and patients are being prosecuted.     Prevent unexpected access/loss of medication: Keep medication locked. Only carry what you need with you.    Celeste Perales, [facility]  [address]  [place] [address]  Ph: [phone]  Fax: [phone]

## 2021-06-21 NOTE — PROGRESS NOTES
"Kain canceled on 9/28.  I called her and she replied she was too anxious to drive herself.  Her  is not available.  Recommend she increase the lamotrigine from 75 mg.    On 9/7 she did not show, I called her and she indicated she had stopped the Wellbutrin with more crying and we did resume that.  Also recommend increasing Cymbalta to 60 mg.    She did not show on 8/24.    Reviews today that she continues \"I cannot go anywhere by myself, I hate it\".  Back on the Wellbutrin 300 mg daily she is not crying as much.  She is taking Cymbalta 40 mg in the morning 20 mg at night without side effects.  Is not having a wooden feeling that she had of the Paxil.  She is using lamotrigine 75 mg over the last 2 weeks.  She is usually fairly comfortable at home, has the anxiety when she is going out and less she is with someone.    Present stressors include conflict in the relationship with her  who is out of the home, and her daughter in a abusive relationship.  She becomes tearful.  She notes she is home alone without significant supports.    She had been on trial of Seroquel which did not help.  She used clonazepam in the past that was helpful.  She is aware we do not wish to use benzodiazepines with the opioids.    She is used marijuana in the past seemed to lose its effect.    She has not been able to go to psychotherapy with Netta, states she cannot follow through with anything.    She has the cast off of her left foot, in a walking boot.  She may start physical therapy.  She is followed by [facility] orthopedics.      Current Outpatient Prescriptions:      acetaminophen (TYLENOL EXTRA STRENGTH) 500 MG tablet, Take 2 tablets (1,000 mg total) by mouth 3 (three) times a day., Disp: 180 tablet, Rfl: 2     acetaminophen (TYLENOL EXTRA STRENGTH) 500 MG tablet, Take 2 tablets (1,000 mg total) by mouth 3 (three) times a day., Disp: 180 tablet, Rfl: 0     amLODIPine (NORVASC) 10 MG tablet, TK 1 T PO  q day, Disp: , " "Rfl: 0     ascorbic acid, vitamin C, (VITAMIN C) 1000 MG tablet, Take 1 tablet (1,000 mg total) by mouth daily., Disp: 30 tablet, Rfl: 4     aspirin 325 MG tablet, Take 1 tablet (325 mg total) by mouth 2 (two) times a day with meals., Disp: , Rfl: 0     buPROPion (WELLBUTRIN XL) 300 MG 24 hr tablet, Take 1 tablet (300 mg total) by mouth daily., Disp: 30 tablet, Rfl: 0     docusate sodium (COLACE) 100 MG capsule, Take 1 capsule (100 mg total) by mouth 2 (two) times a day., Disp: , Rfl: 0     DULoxetine (CYMBALTA) 20 MG capsule, Take 1 capsule (20 mg total) by mouth 3 (three) times a day., Disp: 90 capsule, Rfl: 0     ergocalciferol (VITAMIN D2) 50,000 unit capsule, Take 1 capsule (50,000 Units total) by mouth every 7 days., Disp: 4 capsule, Rfl: 4     lamoTRIgine (LAMICTAL) 25 MG tablet, Take two tabs in am and one tab at bedtime, Disp: 90 tablet, Rfl: 1     varenicline (CHANTIX STARTING MONTH BOX) 0.5 mg (11)- 1 mg (42) tablet, 1 wk before you stop smoking take 0.5mg daily on days 1-3, 0.5mg 2 times each day on days 4-7, then 1mg 2 times daily., Disp: 53 tablet, Rfl: 0     varenicline (CHANTIX) 1 mg tablet, Take 1 tab by mouth two times a day. Take after eating with a full glass of water. NOTE: Dispense as maintenance for refills only., Disp: 60 tablet, Rfl: 2     [START ON 10/26/2018] gabapentin (NEURONTIN) 300 MG capsule, Take 1 capsule (300 mg total) by mouth 3 (three) times a day. TAKE 2 CAPSULES(600 MG) BY MOUTH THREE TIMES DAILY, this is an increase, Disp: 180 capsule, Rfl: 1     [START ON 10/24/2018] HYDROmorphone (DILAUDID) 4 MG tablet, Take 1-2 tablets (4-8 mg total) by mouth 3 (three) times a day as needed (max of 6 tabs per day)., Disp: 84 tablet, Rfl: 0     [START ON 10/19/2018] senna-docusate (SENEXON-S) 8.6-50 mg tablet, Take 1 tablet by mouth 3 (three) times a day., Disp: 90 tablet, Rfl: 0  Blood pressure (!) 144/103, pulse 81, resp. rate 16, height 5' 7\" (1.702 m), weight 206 lb (93.4 kg), not " currently breastfeeding.    Pain score 7.  She is alert with a clear sensorium good eye contact thought process tight logical.    Impression: Chronic pain includes left extremity pain with multiple surgeries.  She has been followed by Celeste Perales NP managing opioids.      Been significant pounds with anxiety, agoraphobia, has missed multiple appointments and I have called as above.    Plan regarding pharmacologic approaches will increase Cymbalta to 80 mg, observing any effect and managing anxiety without having a wooden feeling as above.  We will increase lamotrigine to 100 mg as well for the next 2 weeks.    Discussed the program such as the [facility] program, a partial hospitalization program in which she would attend group program twice a week, this is a program with CBT and treating chronic pain.  We discussed having a safe destination that is predictable that she could go to may help working through [place] phobia.  Her  would help her get to appointments initially and if she gets more comfortable could drive there.  The [place] [facility] is apparently manageable.    After making progress in that regard as hoped, can resume individual psychotherapy with Netta focusing on other methods to manage anxiety.    We discussed a trial of ketamine nasal spray for more rapid onset to help in the short-term for anxiety, noting there are some patients find it helpful.  We are avoiding benzodiazepines and she has used atypical antipsychotics.  Reviewed the off label use of ketamine and side effects.  Will start with a 10% nasal spray 1 spray each nostril 20 minutes before and affecting to drive.  Will gradually increase as tolerated.      All of questions and be seen back in several weeks.    Time spent 25 minutes face-to-face more than 50% count about above condition coronation treatment plan

## 2021-06-21 NOTE — PROGRESS NOTES
Patient presents to the clinic today for a follow up with Celeste Perales CNP regarding ankle pain. She describes her pain as constant, throbbing. Her function score is 4.

## 2021-06-22 NOTE — PROGRESS NOTES
Patient presents to the clinic today for a follow up with Celeste Perales CNP regarding ankle pain. Patient describes their pain as aching, constant. Her function score is 4.

## 2021-06-22 NOTE — TELEPHONE ENCOUNTER
Please review hydromorphone 2 mg tablet order.  Appears written for 1 tablet, however seems should be for 5 tablets to last 5 days.    qued for: Celeste CHAPMAN

## 2021-06-22 NOTE — PROGRESS NOTES
PAIN CLINIC FOLLOW UP PROGRESS NOTE    CC:  Chief Complaint   Patient presents with     Ankle Pain       HPI  Jaime L Franklin-Behrends is a 53 y.o. female who presents for evaluation   Chief Complaint   Patient presents with     Ankle Pain    that is causing continued pain. Since the last visit the patient denies any trips to the urgent care or ED specifically for their pain. The patient denies any new medications, diagnoses since the last visit. Specific questions indicated the patient wanted addressed today include: to follow up on her chronic pain that is mostly in her left ankle and notes that she fell and hurt her right knee.     Major issues:  1. Chronic pain syndrome    2. Adjustment disorder with mixed anxiety and depressed mood    3. Chronic pain of left ankle      Today the pain is located in their left foot and is described as throbbing, burning and shooting when it is aggravated it lasts for constant , and is rated at a 8  on a scale of 1-10, this is an increase since the last visit. Patient notes that the pain started to increase when the cast was removed and the walking boot was placed.       Associated symptoms: Denies any loss of bladder control, fevers/chills, unintentional weight loss, weakness, numbness or pain that interferes with sleep.     Aggravating factors include: increased activity and surgery   Alleviating factors: pain medications and ice with elevation  Adverse effects of medications: NONE   Functional symptoms: affects her in every way on daily bases due to the acute on chronic pain in her left ankle, using a scooter to mobilize   Current subjective treatment efficacy: Poor        Previous Medical History  Social History     Substance and Sexual Activity   Alcohol Use Yes    Comment: socially none per pt     Social History     Substance and Sexual Activity   Drug Use Yes     Types: Marijuana     Social History     Tobacco Use   Smoking Status Former Smoker     Years: 25.00   Smokeless  "Tobacco Never Used       Pertinent Pain Medications/interventions:  She currently takes dilaudid 4 mg three times a day prn up to 6 per day as well as the gabapentin, lamictal, ketamine and cannabis.       Review of Systems:  12 point systems were reviewed with pt as documented on pt health form and the patient denies any new diagnosis or changes in 12 point system review since the last visit. Right knee pain     Physical Exam  Vitals:    12/18/18 0844   BP: (!) 140/92   Patient Site: Right Arm   Patient Position: Sitting   Cuff Size: Adult Regular   Pulse: (!) 103   Resp: 18   Weight: 186 lb (84.4 kg)   Height: 5' 7\" (1.702 m)     General- patient is alert and oriented, in NAD, well-groomed, well-nourished  Psych- Judgment and insight normal, AOx4, recent and remote memory normal, mood and affect normal  Eyes- pupils are equal and reactive, conjunctiva is clear bilaterally, no ptosis is noted.   Respiratory- breathing is non-labored  Cardiovascular- extremities warm and well perfused, no peripheral edema or varicosities.  Musculoskeletal- gait is normal, extremities with no joint swelling, erythema, or warmth. Right knee pain and left ankle pain noted with tenderness.   Neuro- normal strength, no gait abnormalities, normal sensation to pain, temperature, light touch.  Integumentary- no rashes, dermatitis or discolorations noted throughout, no open wounds noted.    Medications    Current Outpatient Medications:      amLODIPine (NORVASC) 10 MG tablet, TK 1 T PO  q day, Disp: , Rfl: 0     aspirin 325 MG tablet, Take 1 tablet (325 mg total) by mouth 2 (two) times a day with meals., Disp: , Rfl: 0     buPROPion (WELLBUTRIN XL) 300 MG 24 hr tablet, Take 1 tablet (300 mg total) by mouth daily., Disp: 30 tablet, Rfl: 0     docusate sodium (COLACE) 100 MG capsule, Take 1 capsule (100 mg total) by mouth 2 (two) times a day., Disp: 60 capsule, Rfl: 3     DULoxetine (CYMBALTA) 60 MG capsule, Take 1 capsule (60 mg total) by " mouth 3 (three) times a day., Disp: 90 capsule, Rfl: 2     [START ON 12/28/2018] gabapentin (NEURONTIN) 300 MG capsule, Take 1 capsule (300 mg total) by mouth 3 (three) times a day., Disp: 180 capsule, Rfl: 2     [START ON 12/19/2018] HYDROmorphone (DILAUDID) 4 MG tablet, Take 1-2 tablets (4-8 mg total) by mouth 3 (three) times a day as needed for pain (max of 6 per day)., Disp: 84 tablet, Rfl: 0     [START ON 1/2/2019] HYDROmorphone (DILAUDID) 4 MG tablet, Take 1-2 tablets (4-8 mg total) by mouth every 4 (four) hours as needed for pain (max of 6 per day)., Disp: 84 tablet, Rfl: 0     medical cannabis (Patient's own supply. Not a prescription), by Other route see administration instructions (This is NOT a prescription, and does not certify that the patient has a qualifying medical condition for medical cannabis.  The purpose of this order is  to document that the patient reports taking medical cannabis.) ., Disp: , Rfl:      senna-docusate (SENEXON-S) 8.6-50 mg tablet, TAKE 1 TABLET BY MOUTH THREE TIMES DAILY AS NEEDED FOR CONSTIPATION., Disp: 90 tablet, Rfl: 0     varenicline (CHANTIX) 1 mg tablet, Take 1 tab by mouth two times a day. Take after eating with a full glass of water. NOTE: Dispense as maintenance for refills only., Disp: 60 tablet, Rfl: 2     acetaminophen (TYLENOL EXTRA STRENGTH) 500 MG tablet, Take 2 tablets (1,000 mg total) by mouth 3 (three) times a day., Disp: 180 tablet, Rfl: 0    Lab:  Last UDS on  4/13/2018 had expected results. Any abnormal results have been discussed with the patient and may change the plan of care. Please see the scanned document from the outside lab under the media tab. This was reviewed with the patient.      Imaging:  No new imaging.      Recent   Dated 12/18/2018 was reviewed with the patient today.   Paper copy noted    Assessment:   Govind GONZALES Franklin-Behrends is a 53 y.o. female seen in clinic today for   Chief Complaint   Patient presents with     Ankle Pain      Patient presents to the pain Center today to follow-up on her chronic pain management plan.  Recently she saw Dr. Ramirez for follow-up in her psychiatric care in relation to her chronic pain and is currently following a good plan patient feels that her depression is under control at this time.  Patient is upbeat throughout the entire visit today making good conversation and is quick to respond.  Currently she does report she fell recently and went to [facility] they did an x-ray noted that she is got severe arthritis in her right knee.  She continues to use the walking boot on the left lower ankle due to her ongoing fusion and healing.  Patient reports no side effects from the current opioid treatment she does note that it does help her manage her pain.  She takes approximately 4-6 tablets of Dilaudid per day is currently using gabapentin as prescribed is also using medical cannabis as needed.     Patient is currently compliant with the current treatment plan will have her follow-up in 4 weeks for continued monitoring today we will also perform a random U DT which is required when prescribing opioids for monitoring.     Patients current MME is 48-96  Patient to call clinic for next month's prescription 5 days in advance. Based on the patients response to their previous narcotic therapy and quality of life, which includes their participation in ADLs, I recommend that the narcotics therapy continue, however the changes listed below will be incorporated into their plan of care.     Patient set goals to   1. Reduce her need for opioids going forward as she heals.    Plan:   Interventions-    Follow up in 4 weeks to evaluate the effectiveness of the treatment interventions ordered today.     Therapy- PT/OT currently managed by her podiatry     Behavioral Health- currently working with Dr. Ramirez     Agree with the continued treatment for the Right Knee.     Have been sent to the pharmacy for the gabapentin,  dilaudid and continue the use of the medical cannabis    Refills have been sent in to the pharmacy for the dilaudid to  on 12/19, 1/2/ and should last you until 1/16/2018    Random UDT today to continue to monitor continued use of opioids.     Dr. Ramirez recently filled your lamictal, cymbalta.     Prescription Drug Management will be continued by the [facility] center  A narcotic contract was signed by the patient and  Patient is unable to get narcotics from other providers. They will be subject to random UAs.      Orders placed today  Medications that were ordered today   Requested Prescriptions     Signed Prescriptions Disp Refills     HYDROmorphone (DILAUDID) 4 MG tablet 84 tablet 0     Sig: Take 1-2 tablets (4-8 mg total) by mouth 3 (three) times a day as needed for pain (max of 6 per day).     gabapentin (NEURONTIN) 300 MG capsule 180 capsule 2     Sig: Take 1 capsule (300 mg total) by mouth 3 (three) times a day.     HYDROmorphone (DILAUDID) 4 MG tablet 84 tablet 0     Sig: Take 1-2 tablets (4-8 mg total) by mouth every 4 (four) hours as needed for pain (max of 6 per day).     Orders Placed This Encounter   Procedures     Pain Management, urine [id number] - Misc. Lab Test     Standing Status:   Standing     Number of Occurrences:   1     Order Specific Question:   What is the name of the test you wish to perform?     Answer:   Pain Management, urine [id number]       UDT/SWAB:  Patient required a random Urine Drug Testing, due to the need to comply with [place] [employer] Policy Guidelines and CDC Guideline for the use of any controlled substances. This is to ensure that patient is compliant with treatment, and monitor for risks such as diversion, abuse, or any other aberrant behaviors. Patient is either being considered for or taking a controlled substance. Unexpected findings will be discussed and treatment decision may be adjusted. Testing is being implemented across the board randomly w/o bias  related to age, race, gender, socioeconomic status or Christian affiliation.    The patient understand todays plan and has their questions answered in regards to expectations and current treatment plan.     SAFETY REMINDERS  No alcohol while taking controlled substances. Alcohol is not an illegal substance, it is unsafe to use in combination. It is a build up of substances in the body that can be extremely hazardous and may cause respirations to slow to a dangerous rate resulting in hospitalization, brain damage, or death.    Opioid medications have been associated with sharp rise in unintentional overdose and death.  Overdose is a condition characterized by the consumption in excess of a particular drug causing adverse effects. This can happen b/c you are sick, accidentally or intentionally took an extra dose, are on multiple medication that can interact. Someone took your medication and they are not use to the medication.  Symptoms of overdose include:   !breathing slow and shallow, erratic or not at all  !pinpoint pupils, hallucinations  !confusion  !muscle jerks, slack muscles   !extreme sleepiness or loss of alertness   !awake but not able to talk   !face pale or clammy, vomiting, for lighter skinned people, the skin tone turns bluish purple, for darker skinned people, it turns grayish or ashen   If in a situation where overdose is a concern engage the emergency response system (dial 911).    In one study it was noted that 80% of unintentional overdoses occurred in people who were taking a combination of opioids and benzodiazepines.    Do not sell, loan, borrow or share your opioid medication with anyone. Deaths have occurred as a result of this practice. It is illegal and patients are being prosecuted.     Prevent unexpected access/loss of medication: Keep medication locked. Only carry what you need with you.    Celeste Perales, [facility]  [address]  [place]  [address]  Ph: [phone]  Fax: [phone]

## 2021-06-22 NOTE — PROGRESS NOTES
Discharge Summary      Dates of service 5/1/18 to 1/3/19 # Sessions completed: 1 (Diagnostic Assessment only).    Diagnosis at Intake  History of Depression, Severe  Generalized Anxiety disorder  Panic attacks  Chronic Pain Syndrome  Alcohol Abuse  Cannabis Abuse  Opiate Abuse, episodic  R/O PTSD        Diagnosis at Discharge  History of Depression, Severe  Generalized Anxiety disorder  Panic attacks  Chronic Pain Syndrome  Alcohol Abuse  Cannabis Abuse  Opiate Abuse, episodic  R/O PTSD        Progress toward goal 1: N/A      Progress toward goal 2: N/A      Reason for discharge: Patient came for a diagnostic assessment only and did not follow up with psychotherapy services.      Prognosis at discharge:Guarded    Recommendations and referrals at discharge: Continue care with pain clinic provider and follow medical recommendations. Schedule and attend psychotherapy with this therapist to further address chronic pain, depression, anxiety and substance abuse symptoms, if desired. Further assessment is needed to R/O PTSD.

## 2021-06-22 NOTE — TELEPHONE ENCOUNTER
Case review noted patient's urinary drug testing that was positive for methadone, which has not been prescribed to the patient.  Patient notes that she only did this 1 time did not feel any effect but did not discuss that during the time of the visit.  Patient understands that this is considered a noncompliance with opioid use and it is no longer safe to prescribe opioids.  Patient is interested in Suboxone and she would like to see Dr. Ramirez in regards to this she is already been established with him and will schedule an appointment.     At this time we will continue the Dilaudid tapering dose patient to continue as she is currently Dilaudid 4 mg-tablets per day next prescription  4 mg-  1/10-4 tablets/day  1/15-3 tablets/day  1/20-2 tablets/day  2 mg   1/25 2 mg tablets-twice daily  1/30- 2 mg tablet-daily  2/4-1 mg, 1/2 tab for 4 days    These have been sent to the pharmacy on file patient has no further questions a non-opioid plan of care will be sent to the patient via certified letter she understands this will be coming in she can continue to seek pain management through the [facility] pain center    Celeste Perales CNP   [facility]

## 2021-06-23 NOTE — TELEPHONE ENCOUNTER
surescript request for acetaminophen 500mg    Last OV: 1/17 with Dr. Ramirez  Next OV: 2/14 with Dr. Ramirez  Cancelled 1/18 with Celeste Ludwig for Dr. Ramirez

## 2021-06-23 NOTE — PATIENT INSTRUCTIONS - HE
PLAN:     Continue Dilaudid 4 mg twice a day until suboxone available    Suboxone 2 mg film under tongue twice a day, call if problems, other wise in 4-5 days to discuss dosing    For withdrawal may use Tizandine 4 mg every six hours as needed, may take one to two at bedtime for sleep and repeat after four hours if needed at [facility]    Return to Dr. Ramirez in 4-5 weeks, or with [facility] pharmacist sooner if available

## 2021-06-23 NOTE — PROGRESS NOTES
"Discussed with Celeste Perales NP.  Patient had a urine drug screen positive with methadone.  See those notes are reviewed.  She is on a tapering schedule hydromorphone.  They had discussed about the use of Suboxone.    On interview she reports that he has started physical therapy on her foot at [facility] orth[facility].  She understands it is to be strengthening.  She will be wearing the walking boot all the time.      She was doing fairly well until 2 weeks ago when the above occurred.  We did clarify that she had been using involved a 60 mg 2 tablets twice a day.  I was concerned about insurance coverage and decrease to 2 in the morning 1 at night.  Insurance did not cover that and she is doing well in terms of her mood until as above without side effects.    She reviews she had had a flareup of knee pain and taken to methadone.  She states it was a \"one-time thing\".  She denies any other concerns around opioid abuse as this was reviewed.  She understands we are making her decision transition.    I reviewed Suboxone as of buprenorphine product, may be helpful for pain, relatively safer if there are concerns about use of opioids and safety.  She has noted some withdrawal symptoms in terms of problems with sleep, \"emotions\", anxiety, no diarrhea no nausea.    Reviewed that her  remains out of the home.  He had relapse with alcohol and she wants him to be 6 months sober as she is trying to make positive changes in her own life.    She has not feeling she needs to continue to see Nancy.      Current Outpatient Medications:      acetaminophen (TYLENOL EXTRA STRENGTH) 500 MG tablet, Take 2 tablets (1,000 mg total) by mouth 3 (three) times a day., Disp: 180 tablet, Rfl: 0     amLODIPine (NORVASC) 10 MG tablet, TK 1 T PO  q day, Disp: , Rfl: 0     aspirin 325 MG tablet, Take 1 tablet (325 mg total) by mouth 2 (two) times a day with meals., Disp: , Rfl: 0     buprenorphine-naloxone (SUBOXONE) 2-0.5 mg Film per " sublingual film, Place 1 Film under the tongue 2 (two) times a day., Disp: 28 each, Rfl: 2     buPROPion (WELLBUTRIN XL) 300 MG 24 hr tablet, Take 1 tablet (300 mg total) by mouth daily., Disp: 30 tablet, Rfl: 0     docusate sodium (COLACE) 100 MG capsule, Take 1 capsule (100 mg total) by mouth 2 (two) times a day., Disp: 60 capsule, Rfl: 3     DULoxetine (CYMBALTA) 60 MG capsule, Take 1 capsule (60 mg total) by mouth 3 (three) times a day., Disp: 90 capsule, Rfl: 2     gabapentin (NEURONTIN) 300 MG capsule, Take 1 capsule (300 mg total) by mouth 3 (three) times a day., Disp: 180 capsule, Rfl: 2     [START ON 1/20/2019] HYDROmorphone (DILAUDID) 2 MG tablet, Take 1 tablet (2 mg total) by mouth 2 (two) times a day as needed for pain (max of 2 per day)., Disp: 10 tablet, Rfl: 0     [START ON 2/4/2019] HYDROmorphone (DILAUDID) 2 MG tablet, Take 0.5 tablets (1 mg total) by mouth daily as needed for pain (1/2 a tab daily for 4 days)., Disp: 2 tablet, Rfl: 0     [START ON 1/30/2019] HYDROmorphone (DILAUDID) 2 MG tablet, Take 1 tablet (2 mg total) by mouth daily as needed for pain (1 per day)., Disp: 5 tablet, Rfl: 0     lamoTRIgine (LAMICTAL) 25 MG tablet, TAKE 2 TABLETS BY MOUTH IN THE MORNING AND 1 TABLET AT BEDTIME, Disp: 90 tablet, Rfl: 0     medical cannabis (Patient's own supply. Not a prescription), by Other route see administration instructions (This is NOT a prescription, and does not certify that the patient has a qualifying medical condition for medical cannabis.  The purpose of this order is  to document that the patient reports taking medical cannabis.) ., Disp: , Rfl:      senna-docusate (SENEXON-S) 8.6-50 mg tablet, TAKE 1 TABLET BY MOUTH THREE TIMES DAILY AS NEEDED FOR CONSTIPATION, Disp: 90 tablet, Rfl: 0     tiZANidine (ZANAFLEX) 4 MG tablet, Take 1 tablet (4 mg total) by mouth every 8 (eight) hours as needed., Disp: 20 tablet, Rfl: 0     varenicline (CHANTIX) 1 mg tablet, TAKE 1 TABLET BY MOUTH TWICE  "DAILY AFTER EATING WITH A FULL GLASS OF WATER, Disp: 60 tablet, Rfl: 0  Blood pressure 162/90, pulse 83, height 5' 7\" (1.702 m), weight 186 lb (84.4 kg), not currently breastfeeding.    Pain schedule 6.  She is alert with a clear sensorium good eye contact thought logical.    Initially affect rather bright, and becomes a bit tearful reviewing above concerns with withdrawal.  Really groomed.    Wearing a walking on her left foot    Assessment: Chronic pain relates to lower left foot with multiple surgeries.    There is been a significant [place] phobia and depression and improving we reviewed her last appointment.    As noted concern with opioid use, take another opioid that was not recommended prescribed, understanding the safety concerns.  It does not appear there are other primary opioid abuse issues.    Plan she will discontinue the hydro-more phone when she is able to start the Suboxone, will use 2 mg twice a day.  Reviewed its use, side effects, will call if there are problems otherwise within for 5 days to adjust the dose to help with pain management as cyst function as well.    For withdrawal will use tizanidine, 4 mg every 6 hours as needed and at bedtime to assist sleep.    She will continue with other psychotropic agents as above.    She will follow-up in several weeks with the undersigned or with our new [facility] pharmacist.    Time spent more than 15 minutes face-to-face 50% count about above condition coronation treatment plan  "

## 2021-06-23 NOTE — TELEPHONE ENCOUNTER
"Pt calls to say \"the withdrawal meds aren't working and Suboxone isn't working at all.\" Please advise.  "

## 2021-06-23 NOTE — TELEPHONE ENCOUNTER
Patient's original message/refill request:surescript request    Last appointment:1/17/19 with Dr. Ramirez  Next appointment:2/14/19 with Dr. Ramirez  Notes/Comments:med due now     Med queued up and sent to Dr. Ramirez

## 2021-06-23 NOTE — TELEPHONE ENCOUNTER
Prior Authorization Request  Who s requesting:  Celeste Perales CNP/Erinn Maynard CMA  Pharmacy Name and Location: [facility] [address] [place], [place]  Medication Name:HYDROmorphone 2 MG tablet   Insurance Plan: [employer] Ad[employer]age  Insurance Member ID Number:    Informed patient that prior authorizations can take up to 10 business days for response:   Yes; 72 hours  Okay to leave a detailed message: Yes

## 2021-06-23 NOTE — PROGRESS NOTES
Pt is being seen today by Emeterio Ramirez MD, for discussion of Suboxone and f/u of 6-constant, throbbing lt ankle pain. F8

## 2021-06-23 NOTE — TELEPHONE ENCOUNTER
Patient left message, had talked to Dr Ramirez, and was supposed to have increase in Suboxone. Not at the pharmacy. Also expecting a sleeper.

## 2021-06-23 NOTE — TELEPHONE ENCOUNTER
"Calls to say continuing with pain in withdrawal, his use of Suboxone 2 mg films twice a day.  Each dose does not help with pain.  She also notes the film seem to make her \"gagging\".  Her graph is tried tizanidine to help with withdrawal up to 2 tablets at bedtime which is not helping sleeping.    Plan she will increase the Suboxone films up to 4 5 times in a day over the next couple of days to see if that is more helpful and then will switch over to tablets possibly using the 8 mg tablet.    She will increase the tizanidine to 3 tablets at bedtime and repeating.  "

## 2021-06-23 NOTE — TELEPHONE ENCOUNTER
Patient's original message/refill request:pharmacy refill request for bupropion xl 300 mg #30    Last appointment:11/27/18 with Dr. Ramirez  Next appointment:1/17/19 with Dr. Ramirez  Notes/Comments:per pharmacy med last filled 12/17/18. Med due to be filled 1/16/19    This refill is in compliance with the last dictation/plan of care.   Med queued up and sent to Dr. Ramirez

## 2021-06-23 NOTE — TELEPHONE ENCOUNTER
Patient's original message/refill request:pharmacy refill request for senna plus    Last appointment:1/17/19 with Dr. Ramirez  Next appointment:2/14/19 with Dr. Ramirez    Notes/Comments:per pharmacy med last filled 1/5/19. Med due 2/4/19      Med queued up and sent to Dr. Ramirez

## 2021-06-23 NOTE — TELEPHONE ENCOUNTER
Prior Authorization Request  Who s requesting:  Emeterio Ramirez MD/ Jennifer Brewer CMA  Pharmacy Name and Location: [facility]  Phone [phone]  Fax [phone]  Medication Name: Duloxetine 60mg  Insurance Plan: Blue Plus  Insurance Member ID Number:  [id number]  Informed patient that prior authorizations can take up to 10 business days for response:   Yes  Okay to leave a detailed message: Yes

## 2021-06-24 NOTE — TELEPHONE ENCOUNTER
Refill request: senna/docusate 8.6/50 mg  Last ov:1/17  No show for apt on 2/14  Next ov:no follow up set  Cued for: Dr. Ramirez    Patient to return to Dr. Ramirez within 4-5 weeks or with pharmD sooner if able.

## 2021-06-24 NOTE — TELEPHONE ENCOUNTER
Per pt, she had stopped all narcotics and was going to start on Suboxone, but is not wanting to start Suboxone at this time.  She is having knee surgery soon.  Scheduled for 1st available with Dr. Ramirez, 04-19-19.  Does she need to see Fernando sooner?

## 2021-06-24 NOTE — TELEPHONE ENCOUNTER
"Patient calling requesting to speak with Dr. Ramirez.  \"I was coming to your clinic for pain medications and would like to talk with him\"    Last office visit with Dr. Ramirez on 01/17/2019  Next scheduled for 4/19 with Dr. Ramirez  "

## 2021-06-24 NOTE — TELEPHONE ENCOUNTER
penny request for ES acetaminophine 500mg    Last filled: 2/2    Last OV:  1/17 NS 2/14  Next appt is not scheduled    Queued for Dr. Ramirez

## 2021-06-25 NOTE — TELEPHONE ENCOUNTER
Central PA team  [phone]  Pool: HE PA MED ([id number])          PA has been initiated.       PA form completed and faxed insurance via Cover My Meds     Key:  [id number]     Medication:  Duloxetine 60mg    Insurance:  [employer]        Response will be received via fax and may take up to 5-10 business days depending on plan

## 2021-06-25 NOTE — TELEPHONE ENCOUNTER
Prior Authorization Request  Who's requesting PA: Pharmacy  Provider: James  Pharmacy Name, Location & Phone # : [facility]/[phone]  Medication Name: duloxetine 60mg caps, 3/day  Quantity: 90  Refills: 5  Days Supply: 30  Medication Instructions: 2 capsules by mouth in the morning and 1 capsule by mouth at HS  Insurance Plan: [place]  Insurance Member ID & GRP # : [id number]  CoverMyMeds Key:  Informed patient that prior authorizations can take up to 10 business days for response: YES  Okay to leave a detailed message: NO    PA  in May    Route to: [facility] PA MED ([id number])

## 2021-06-26 NOTE — PROGRESS NOTES
Progress Notes by Celeste Perales CNP at 4/13/2018  8:53 AM     Author: Celeste Perales CNP Service: -- Author Type: Nurse Practitioner    Filed: 4/13/2018  1:06 PM Date of Service: 4/13/2018  8:53 AM Status: Signed    : Celeste Perales CNP (Nurse Practitioner)       PAIN CLINIC FOLLOW UP PROGRESS NOTE    CC:  Chief Complaint   Patient presents with   ? Ankle Pain     left       HPI  Jaime L Franklin-Behrends is a 52 y.o. female who presents for evaluation   Chief Complaint   Patient presents with   ? Ankle Pain     left    that is causing continued pain. Since the last visit the patient denies any trips to the urgent care or ED specifically for their pain. The patient denies any new medications, diagnoses since the last visit. Specific questions indicated the patient wanted addressed today include: pain control and upcoming surgery. Patient endorses taking 12-15 oxycodone 5 mg tabs per day due to pain. MME of  provided by her PCP Dr. Burk     Patients PCP is Sherin Zelaya, at [facility]       Major issues:  1. Chronic pain syndrome    2. Tear of deltoid ligament of ankle, left, sequela    3. Chronic pain of left ankle        Today the pain is located in their left ankle, foot and is described as throbbing and achy when it is aggravated it lasts for constant, and is rated at a 9 on a scale of 1-10  Associated symptoms: Denies any loss of bladder control, fevers/chills, unintentional weight loss, weakness, numbness or pain that interferes with sleep.   Aggravating factors include: bearing weight   Alleviating factors: not much  Adverse effects of medications: NONE   Functional symptoms:F7  Current subjective treatment efficacy: Poor      Previous Medical History  History   Alcohol Use No     History   Drug Use   ? Yes   ? Special: Marijuana     History   Smoking Status   ? Current Every Day Smoker   ? Packs/day: 0.50   Smokeless Tobacco   ? Never  "Used       Pertinent Pain Medications/interventions:  She currently takes oxycodone 5 mg tablets 12-15 per day MME of      Review of Systems:  12 point systems were reviewed with pt as documented on pt health form and the patient denies any new diagnosis or changes in 12 point system review since the last visit.     Physical Exam  Vitals:    04/13/18 0907   BP: 109/86   Patient Site: Left Arm   Patient Position: Sitting   Cuff Size: Adult Regular   Pulse: 92   Resp: 18   Weight: 208 lb (94.3 kg)   Height: 5' 7.75\" (1.721 m)     General- patient is alert and oriented, in NAD, well-groomed, well-nourished  Psych- Judgment and insight normal, AOx4, recent and remote memory normal, mood and affect normal  Eyes- pupils are equal and reactive, conjunctiva is clear bilaterally, no ptosis is noted.   Respiratory- breathing is non-labored  Cardiovascular- extremities warm and well perfused, no peripheral edema or varicosities.  Musculoskeletal- gait is normal, extremities with no joint swelling, erythema, or warmth.  Neuro- normal strength, no gait abnormalities, normal sensation to pain, temperature, light touch.  Integumentary- no rashes, dermatitis or discolorations noted throughout, no open wounds noted.    Medications    Current Outpatient Prescriptions:   ?  acetaminophen (TYLENOL) 325 MG tablet, Take 2 tablets (650 mg total) by mouth daily as needed., Disp: , Rfl: 0  ?  amLODIPine (NORVASC) 5 MG tablet, Take 5 mg by mouth daily., Disp: , Rfl:   ?  aspirin 325 MG EC tablet, Take 1 tablet (325 mg total) by mouth daily., Disp: 30 tablet, Rfl: 0  ?  buPROPion (WELLBUTRIN XL) 300 MG 24 hr tablet, Take 300 mg by mouth daily., Disp: , Rfl:   ?  CHANTIX STARTING MONTH BOX 0.5 mg (11)- 1 mg (42) tablet, , Disp: , Rfl: 0  ?  ferrous gluconate (FERGON) 324 MG tablet, Take 324 mg by mouth 2 (two) times a day., Disp: , Rfl:   ?  senna-docusate (SENNOSIDES-DOCUSATE SODIUM) 8.6-50 mg tablet, Take 1 tablet by mouth 3 (three) " times a day as needed for constipation., Disp: , Rfl:   ?  HYDROcodone-acetaminophen 5-325 mg per tablet, Take 1-2 tablets by mouth every 4 (four) hours as needed for pain (max of 8 per day)., Disp: 96 tablet, Rfl: 0  ?  morphine (MS CONTIN) 30 MG 12 hr tablet, Take 1 tablet (30 mg total) by mouth every 12 (twelve) hours for 12 days., Disp: 24 tablet, Rfl: 0    Lab:  Last UDS on 3/22/2018 was positive for the currently prescribed narcotics. Please see the scanned document from the outside lab. This was reviewed with the patient.      Imaging:  No new imaging.      Recent   Dated 4/13/2018 was reviewed with the patient today.       Assessment:   Jaime L Franklin-Behrends is a 52 y.o. female seen in clinic today for   Chief Complaint   Patient presents with   ? Ankle Pain     left     Patient has signed a opioid contract with the pain center today, she is scheduled to have surgery on April 27 th for her nonunion left ankle. Patient is currently taking a large amount of oxycodone at 12-15 tablets per day and reports that it is not helping her pain. Will convert her to a different opioid today in anticipation of medication rotation to assist with pain control while keeping her at the same MME of 100. Patient already has a narcan prescription at home. Patient does report that she uses cannabis and is aware that this is not tolerated at the pain clinic while taking opioids. Patient states that she takes it for her pain and anxiety, she will see Netta for risks assessment. Based on this she may be a candidate for medical cannabis. Also discussed the use of any alcohol, patient feels that she does not have an issue with this and will stop immediately.     Today we will transition the patient to a long-acting morphine as well as short-acting medication for breakthrough.  He is previously been taking oxycodone 5 mg 12-15 tablets per day which is provided to her by Dr. Burk at [facility] she has been on this  regimen for over 1 year.  Convert her to the same MME to reduce any withdrawal symptoms.  He agrees this plan and will follow up with me prior to her upcoming surgery for reevaluation and then postoperatively for consideration of continuation of opioid care on her response to the surgical intervention    They are here for follow up and continued medical management of their pain. Today we reviewed the lab work of the UDT test and the results are expected because of the prescribed narcotics found in the urine drug screen.     I have also reviewed the information obtained from the last visit encounter after the FERNANDA was signed and have asked any pertintent questions needed from other healthcare providers/family/patient to continue care and formulate a treatment plan.     Patients current MME is 100  Patient to call clinic for next month's prescription 5 days in advance. Based on the patients response to their previous narcotic therapy and quality of life, which includes their participation in ADLs, I recommend that the narcotics therapy continue, however the changes listed below will be incorporated into their plan of care.     Patient set goals to   1. Pain control prior to and after surgery     Plan:   Interventions-     Follow up on 4/23/2018  to evaluate the effectiveness of the treatment interventions ordered today.     Therapy- PT/OT- not at this time, upcoming surgery April 27th    Behavioral Health with Joel for risk assessment and anxiety, medical cannabis consideration    Will start morphine today 30 mg BID and Norco 5 mg tablets 1-2 tablets every 4-6 hours, max of 8 per day. This is a 12 day supply, I will see you in 12 days for re-evaluation.      Future directions include the dietician- must increase your protein for surgery, vitamin C tablet, vitamin D3 units, take 5000 u per day, take vitamin B complex gel tablet.     Prescription Drug Management will be continued by the [facility]  A  narcotic contract was signed by the patient and  Patient is unable to get narcotics from other providers. They will be subject to random UAs.      Orders placed today  Medications that were ordered today   Requested Prescriptions     Signed Prescriptions Disp Refills   ? morphine (MS CONTIN) 30 MG 12 hr tablet 24 tablet 0     Sig: Take 1 tablet (30 mg total) by mouth every 12 (twelve) hours for 12 days.   ? HYDROcodone-acetaminophen 5-325 mg per tablet 96 tablet 0     Sig: Take 1-2 tablets by mouth every 4 (four) hours as needed for pain (max of 8 per day).     No orders of the defined types were placed in this encounter.      UDT/SWAB:  Patient required a random Urine Drug Testing, due to the need to comply with [facility] Model Policy Guidelines and CDC Guideline for the use of any controlled substances. This is to ensure that patient is compliant with treatment, and monitor for risks such as diversion, abuse, or any other aberrant behaviors. Patient is either being considered for or taking a controlled substance. Unexpected findings will be discussed and treatment decision may be adjusted. Testing is being implemented across the board randomly w/o bias related to age, race, gender, socioeconomic status or Alevism affiliation.    The patient understand todays plan and has their questions answered in regards to expectations and current treatment plan.     SAFETY REMINDERS  No alcohol while taking controlled substances. Alcohol is not an illegal substance, it is unsafe to use in combination. It is a build up of substances in the body that can be extremely hazardous and may cause respirations to slow to a dangerous rate resulting in hospitalization, brain damage, or death.    Opioid medications have been associated with sharp rise in unintentional overdose and death.  Overdose is a condition characterized by the consumption in excess of a particular drug causing adverse effects. This can happen b/c you are sick,  accidentally or intentionally took an extra dose, are on multiple medication that can interact. Someone took your medication and they are not use to the medication.  Symptoms of overdose include:   !breathing slow and shallow, erratic or not at all  !pinpoint pupils, hallucinations  !confusion  !muscle jerks, slack muscles   !extreme sleepiness or loss of alertness   !awake but not able to talk   !face pale or clammy, vomiting, for lighter skinned people, the skin tone turns bluish purple, for darker skinned people, it turns grayish or ashen   If in a situation where overdose is a concern engage the emergency response system (dial 911).    In one study it was noted that 80% of unintentional overdoses occurred in people who were taking a combination of opioids and benzodiazepines.    Do not sell, loan, borrow or share your opioid medication with anyone. Deaths have occurred as a result of this practice. It is illegal and patients are being prosecuted.     Prevent unexpected access/loss of medication: Keep medication locked. Only carry what you need with you.    Celeste Perales, [facility], [facility]  [address]  [place] [address]  Ph: [phone]  Fax: [phone]

## 2021-06-26 NOTE — PROGRESS NOTES
Progress Notes by Celeste Perales CNP at 8/17/2018  8:33 AM     Author: Celeste Perales CNP Service: -- Author Type: Nurse Practitioner    Filed: 8/17/2018  9:24 AM Date of Service: 8/17/2018  8:33 AM Status: Signed    : Celeste Perales CNP (Nurse Practitioner)       PAIN CLINIC FOLLOW UP PROGRESS NOTE    CC:  Chief Complaint   Patient presents with   ? Ankle Pain       HPI  Jaime L Franklin-Behrends is a 53 y.o. female who presents for evaluation   Chief Complaint   Patient presents with   ? Ankle Pain    that is causing continued pain. Since the last visit the patient denies any trips to the urgent care or ED specifically for their pain. The patient denies any new medications, diagnoses since the last visit. Specific questions indicated the patient wanted addressed today include: to follow up on her chronic pain, and recent postop pain in left ankle      Major issues:  1. Chronic pain syndrome    2. Status post ankle fusion    3. Status post hardware removal    4. S/P bone graft    5. Postoperative pain    6. Chronic pain of left ankle        Today the pain is located in their left foot and is described as throbbing, burning and shooting when it is aggravated it lasts for constant , and is rated at a 9 on a scale of 1-10  Associated symptoms: Denies any loss of bladder control, fevers/chills, unintentional weight loss, weakness, numbness or pain that interferes with sleep.   Aggravating factors include: increased activity and surgery   Alleviating factors: pain medications and ice with elevation  Adverse effects of medications: NONE   Functional symptoms: affects her in every way on daily bases due to the acute on chronic pain in her left ankle, using a scooter to mobilize   Current subjective treatment efficacy: Poor      Previous Medical History  History   Alcohol Use   ? Yes     Comment: socially none per pt     History   Drug Use   ? Yes   ? Special: Marijuana     History   Smoking  "Status   ? Current Every Day Smoker   ? Packs/day: 0.50   ? Years: 25.00   Smokeless Tobacco   ? Never Used     Comment: 3 cigs/d still trying to quit       Pertinent Pain Medications/interventions:  She currently takes gabapentin 300 mg 3 times daily, Dilaudid 4 mg up to 6 per day, Vistaril as needed    Patient is also taking Lamictal, Chantix, Cymbalta for anxiety depression and smoking cessation through Dr. Ramirez.  Recently got off of Wellbutrin short-acting and long-acting as well as Paxil        PAIN ROOMING NAHEED (2/4)         Questions Answer Comment     Accompanied by       Refills needed? Yes      Date and Time of Short acting Narcotic Dose Hydromorphone last taken: 08/17/2018 @pm      Date and Time of Long acting Narcotic Dose                 Review of Systems:  12 point systems were reviewed with pt as documented on pt health form and the patient denies any new diagnosis or changes in 12 point system review since the last visit.     Physical Exam  Vitals:    08/17/18 0851   BP: (!) 134/92   Patient Site: Left Arm   Patient Position: Sitting   Cuff Size: Adult Regular   Pulse: 100   Resp: 18   Weight: 206 lb (93.4 kg)   Height: 5' 7\" (1.702 m)     General- patient is alert and oriented, in NAD, well-groomed, well-nourished  Psych- Judgment and insight normal, AOx4, recent and remote memory normal, mood and affect normal  Eyes- pupils are equal and reactive, conjunctiva is clear bilaterally, no ptosis is noted.   Respiratory- breathing is non-labored  Cardiovascular- extremities warm and well perfused, no peripheral edema or varicosities.  Musculoskeletal- gait is normal, extremities with no joint swelling, erythema, or warmth.  Neuro- normal strength, no gait abnormalities, normal sensation to pain, temperature, light touch.  Integumentary- no rashes, dermatitis or discolorations noted throughout, no open wounds noted.    Medications    Current Outpatient Prescriptions:   ?  amLODIPine (NORVASC) " 10 MG tablet, TK 1 T PO  q day, Disp: , Rfl: 0  ?  aspirin 325 MG tablet, Take 1 tablet (325 mg total) by mouth 2 (two) times a day with meals., Disp: , Rfl: 0  ?  docusate sodium (COLACE) 100 MG capsule, Take 1 capsule (100 mg total) by mouth 2 (two) times a day., Disp: , Rfl: 0  ?  DULoxetine (CYMBALTA) 20 MG capsule, One daily for ten days, two daily for ten days, then three daily, Disp: 60 capsule, Rfl: 0  ?  gabapentin (NEURONTIN) 300 MG capsule, TAKE 2 CAPSULES(600 MG) BY MOUTH THREE TIMES DAILY, this is an increase, Disp: 180 capsule, Rfl: 1  ?  lamoTRIgine (LAMICTAL) 25 MG tablet, One daily for two weeks, one twice a day for two weeks, then two morning and one bedtime, Disp: 60 tablet, Rfl: 1  ?  varenicline (CHANTIX STARTING MONTH BOX) 0.5 mg (11)- 1 mg (42) tablet, 1 wk before you stop smoking take 0.5mg daily on days 1-3, 0.5mg 2 times each day on days 4-7, then 1mg 2 times daily., Disp: 53 tablet, Rfl: 0  ?  varenicline (CHANTIX) 1 mg tablet, Take 1 tab by mouth two times a day. Take after eating with a full glass of water. NOTE: Dispense as maintenance for refills only., Disp: 60 tablet, Rfl: 2  ?  acetaminophen (TYLENOL EXTRA STRENGTH) 500 MG tablet, Take 2 tablets (1,000 mg total) by mouth 3 (three) times a day., Disp: 180 tablet, Rfl: 0  ?  HYDROmorphone (DILAUDID) 4 MG tablet, Take 1-2 tablets (4-8 mg total) by mouth 3 (three) times a day as needed (max of 6 tabs per day)., Disp: 180 tablet, Rfl: 0  ?  hydrOXYzine pamoate (VISTARIL) 50 MG capsule, Take 1 capsule (50 mg total) by mouth every 4 (four) hours as needed for anxiety., Disp: 120 capsule, Rfl: 0    Lab:  Last UDS had expected results. Any abnormal results have been discussed with the patient and may change the plan of care. Please see the scanned document from the outside lab under the media tab. This was reviewed with the patient.      Imaging:    No new imaging.      Recent   Dated 8/17/2018 was reviewed with the patient today.   Paper  copy reviewed    Assessment:   Jaime L Franklin-Behrends is a 53 y.o. female seen in clinic today for   Chief Complaint   Patient presents with   ? Ankle Pain     Patient is here for her follow-up check in her chronic pain management.  Currently she is taking Dilaudid 4 mg tablets up to 6 times a day she reports that it is not covering her pain completely however her NME is 96 at this time currently we will utilize Vistaril, and increase gabapentin to help cover her discomfort with her acute postoperative pain on her chronic pain of her left ankle.  Patient is also working with Dr. Ramirez in regards to her mental health who is currently changing a few things.    We will also schedule Tylenol Extra Strength 500 mg tablets for 1000 mg 3 times daily.  Patient is to follow-up in 1 month for recheck this will continue for reevaluation as we plan on tapering.    Patients current MME is 96  Patient to call clinic for next month's prescription 5 days in advance. Based on the patients response to their previous narcotic therapy and quality of life, which includes their participation in ADLs, I recommend that the narcotics therapy continue, however the changes listed below will be incorporated into their plan of care.     Patient set goals to   1.  To control her ongoing postoperative pain which is acute on chronic in her left ankle after a recent bone grafting/fusion was to get off opioids completely-once healed    Plan:   Interventions-    Follow up in 4 weeks to evaluate the effectiveness of the treatment interventions ordered today.     Please bring any opioids or controlled substances in that are prescribed by the clinic for pill counts for every visit.     Will send script for the vistaril, morphine and gabapentin ( 600 mg three times a day) as well as extra strength tylenol 500 mg -1000 mg three times a day scheduled)     Continue the chantix, cymbalta and lamictal per Dr. Ramirez- you report that you are more  tearful in the morning after the recent switch.    Prescription Drug Management will be continued by the [facility]  A narcotic contract was signed by the patient and  Patient is unable to get narcotics from other providers. They will be subject to random UAs.      Orders placed today  Medications that were ordered today   Requested Prescriptions     Signed Prescriptions Disp Refills   ? HYDROmorphone (DILAUDID) 4 MG tablet 180 tablet 0     Sig: Take 1-2 tablets (4-8 mg total) by mouth 3 (three) times a day as needed (max of 6 tabs per day).   ? hydrOXYzine pamoate (VISTARIL) 50 MG capsule 120 capsule 0     Sig: Take 1 capsule (50 mg total) by mouth every 4 (four) hours as needed for anxiety.   ? acetaminophen (TYLENOL EXTRA STRENGTH) 500 MG tablet 180 tablet 0     Sig: Take 2 tablets (1,000 mg total) by mouth 3 (three) times a day.   ? gabapentin (NEURONTIN) 300 MG capsule 180 capsule 1     Sig: TAKE 2 CAPSULES(600 MG) BY MOUTH THREE TIMES DAILY, this is an increase     No orders of the defined types were placed in this encounter.      UDT/SWAB:  Patient required a random Urine Drug Testing, due to the need to comply with [facility] Model Policy Guidelines and CDC Guideline for the use of any controlled substances. This is to ensure that patient is compliant with treatment, and monitor for risks such as diversion, abuse, or any other aberrant behaviors. Patient is either being considered for or taking a controlled substance. Unexpected findings will be discussed and treatment decision may be adjusted. Testing is being implemented across the board randomly w/o bias related to age, race, gender, socioeconomic status or Druze affiliation.    The patient understand todays plan and has their questions answered in regards to expectations and current treatment plan.     SAFETY REMINDERS  No alcohol while taking controlled substances. Alcohol is not an illegal substance, it is unsafe to use in combination.  It is a build up of substances in the body that can be extremely hazardous and may cause respirations to slow to a dangerous rate resulting in hospitalization, brain damage, or death.    Opioid medications have been associated with sharp rise in unintentional overdose and death.  Overdose is a condition characterized by the consumption in excess of a particular drug causing adverse effects. This can happen b/c you are sick, accidentally or intentionally took an extra dose, are on multiple medication that can interact. Someone took your medication and they are not use to the medication.  Symptoms of overdose include:   !breathing slow and shallow, erratic or not at all  !pinpoint pupils, hallucinations  !confusion  !muscle jerks, slack muscles   !extreme sleepiness or loss of alertness   !awake but not able to talk   !face pale or clammy, vomiting, for lighter skinned people, the skin tone turns bluish purple, for darker skinned people, it turns grayish or ashen   If in a situation where overdose is a concern engage the emergency response system (dial 911).    In one study it was noted that 80% of unintentional overdoses occurred in people who were taking a combination of opioids and benzodiazepines.    Do not sell, loan, borrow or share your opioid medication with anyone. Deaths have occurred as a result of this practice. It is illegal and patients are being prosecuted.     Prevent unexpected access/loss of medication: Keep medication locked. Only carry what you need with you.    Celeste Perales, [facility]  [address]  [place] [address]  Ph: [phone]  Fax: [phone]

## 2021-06-26 NOTE — PROGRESS NOTES
Progress Notes by Celeste Perales CNP at 3/22/2018  9:19 AM     Author: Celeste Perales CNP Service: -- Author Type: Nurse Practitioner    Filed: 3/22/2018  5:01 PM Date of Service: 3/22/2018  9:19 AM Status: Signed    : Celeste Perales CNP (Nurse Practitioner)       [facility]  New patient consultation        CC-  Chief Complaint   Patient presents with   ? Consult     Left ankle pain         Jaime L Franklin-Behrends 52 y.o. is here today, sent to me by  to discuss the patients pain at [facility].  Associated symptoms with pain include in the left ankle pain that has been ongoing for 2 years after she broke ankle initially after a fall and having it slammed in a door in her pantry. She has had multiple surgeries in the past as a result of this but it is a non union at this time. Patient reports that her podiatrist is scheduling her for another upcoming surgery for a ankle fusion in April and [place] patient is still non-weightbearing.      Alleviating factors: Include- oxycdone and resting with ice, elevation   Aggravating factors: activity including bending, standing, laying down or lifting  Currently using a roller due to non-weightbearing status.   Pain level today: On a scale of 1-10, the patient rates their pain at a 8  Function Rating:F8       HPI  Past Medical History:   Diagnosis Date   ? Anemia    ? Depression    ? GERD (gastroesophageal reflux disease)    ? Hypertension      Past Surgical History:   Procedure Laterality Date   ? ANKLE ARTHROSCOPY     ? ANKLE ARTHROSCOPY Left 1/26/2018    Procedure: LEFT ANKLE ARTHROSCOPY;  Surgeon: Med Burk DO;  Location: [facility] OR;  Service:    ? ANKLE LIGAMENT RECONSTRUCTION Left 6/23/2017    Procedure: LEFT DELTOID AND LATERAL LIGAMENT RECONSTRUCTION ;  Surgeon: Med Burk DO;  Location: [facility] OR;  Service:    ? APPENDECTOMY      ? HYSTERECTOMY     ? KNEE ARTHROSCOPY     ? NEPHROSTOMY TRACT DILATATION W/ LITHOTRIPSY     ? OH FUSION FOOT BONES,SUBTALAR Left 1/26/2018    Procedure: LEFT ANKLE ARTHRODESIS, HARDWARE REMOVAL;  Surgeon: Med Burk DO;  Location: [facility] OR;  Service: Orthopedics     Family History   Problem Relation Age of Onset   ? Family history unknown: Yes     History   Smoking Status   ? Current Every Day Smoker   ? Packs/day: 0.50   Smokeless Tobacco   ? Never Used     History   Alcohol Use No     History   Drug Use   ? Yes   ? Special: Marijuana     History   Sexual Activity   ? Sexual activity: male       Chemical Dependency History: Patient endorses tobacco use- but working on quitting, denies alcohol use, Endorses illicit substance use including THC.  Endorses chemical dependency evaluation or treatment in the past.  Denies any legal issues related to substance use  To meth which was approximately 12 years ago. Patient feels that she uses her THC for her anxiety more then anything.     Psychiatric History:  Patient reports no current psychiatrist or psychologist.  Denies any suicidal ideation.  Denies any hospitalizations for mental illness.    Pertinent Pain Medications:    She currently takes oxycodone 5 mg, 15 tablets per day. Last dose this am 3/22/2018      Current Outpatient Prescriptions:   ?  acetaminophen (TYLENOL) 325 MG tablet, Take 2 tablets (650 mg total) by mouth daily as needed., Disp: , Rfl: 0  ?  amLODIPine (NORVASC) 5 MG tablet, Take 5 mg by mouth daily., Disp: , Rfl:   ?  aspirin 325 MG EC tablet, Take 1 tablet (325 mg total) by mouth daily., Disp: 30 tablet, Rfl: 0  ?  buPROPion (WELLBUTRIN XL) 300 MG 24 hr tablet, Take 300 mg by mouth daily., Disp: , Rfl:   ?  ferrous gluconate (FERGON) 324 MG tablet, Take 324 mg by mouth 2 (two) times a day., Disp: , Rfl:   ?  senna-docusate (SENNOSIDES-DOCUSATE SODIUM) 8.6-50 mg tablet, Take 1 tablet by mouth 3 (three) times a day as needed  "for constipation., Disp: , Rfl:   ?  CHANTIX STARTING MONTH BOX 0.5 mg (11)- 1 mg (42) tablet, , Disp: , Rfl: 0    Therapeutic interventions previously tried-   No PT due to the repeat surgeries.1 injection for her foot      Review of Systems:  12 point systems were reviewed with pt as documented on pt health form of 3/22/2018. ROS was positive for Smoking and anxiety the rest of the systems were pertinent negative.    Exam  Vitals:    03/22/18 0956   BP: 126/82   Patient Site: Left Arm   Patient Position: Sitting   Cuff Size: Adult Regular   Pulse: 96   Resp: 18   Weight: 208 lb (94.3 kg)   Height: 5' 7.75\" (1.721 m)     Constitutional-General:  Pleasant, straightforward, healthy appearing individual.   Psych-Mental Status: A & O in no acute distress. Speech is fluent.  Recent and remote memory are intact.  Attention span and concentration are normal. Displays appropriate mood and affect.   H,E,N,T- Symmetrical, Eyes- Perrla, Nares- patents bilaterally, throat- trachea is midline, airway is patent, unlabored respiratory effort.  Lymph-cervical lymph system (anterior and posterior) without palpable nodules or tenderness throughout. Supraclavicular chain without palpable nodules or tenderness throughout.   Cardiovascular- Regular S1, S2 rhythm without murmurs, gallops, clicks or rubs. legs and feet are warm.  Pulses are palpable and grade 2 at the posterior tibial arteries bilaterally, no edema noted.  Pulmonary- lungs are clear to auscultation throughout   Musckuloskeletal  Gait:  Gait is normal.   Gross Motor: Toe walking, heel walking, and Romberg tests are normal.   Strength:  Bilateral upper and lower extremity strength is normal and symmetric 5/5. No atrophy or tone abnormalities noted.   Sensation:  No loss of sensation noted to light touch in both upper and lower extremities. No dermatomal abnormal distributions noted.  Spine ROM:  Normal range of motion with no pain reproduction in the cervical, thoracic and " lumbar spine.   Provocative Maneuvers:  Upper extremity, Hip, sacroiliac, and knee provocative maneuvers are negative,Tinel's test negative bilaterally, Facet loading test negative bilaterally. Impingement tests of rotator cuff pathology, negative bilaterally. Knee exam: Ligaments test was negative, Houston test was negative. SLR test was negative bilaterally..   Palpation:  Inspection and palpatory examination of the spine and upper/lower extremities is unremarkable. Tenderness is noted in the left ankle, no erythema, drainage noted.   Neuro:  Bilateral upper and lower extremity coordination and muscle stretch reflexes are physiologic and symmetric 2/4.  Babinski responses are downgoing.  No clonus bilaterally. Negative hoffmans sign bilaterally.   Integumentary: no rashes or breaks in the skin, no open wounds.     Lab:  Last UDS on 3/22/2018 was taken today and is pending.      Imaging:  No new imaging available to review    :  Dated 3/22/2018 was reviewed with the patient      Assessment -  Todays diagnosis includes   1. Chronic pain syndrome    2. Chronic pain of left ankle      This patient presents tot he office today to establish cares, referred by her podiatrist Dr. Burk from [place]. Patient has had ongoing surgeries interventions after a fall from standing approximately 2 years ago. Patient has had multiple surgeries and delayed healing that has been ongoing since this time. Currently patient feels that the regimen of opioids is not working for her at 5 mg tablets- 15 tablets per day and recently finished a surgery in february with another one planned in April. PMH includes untreated anxiety as well as use of marijuana to treat this and Chemical dependency issues approximately 12 years ago for Methamphetamines.    Will work up the patients nutritional level, vitamin D,  And bone density. Plan of opioid cares will be dependent on the results of the UDT. Patient agrees with this plan and has no  questions. Will also consider the use of extended Vitamin C for bone health support. Requested that the patient increase her protein to 60 gm per day minimum.     After reviewing the patients chart and physical findings I agree that the patient would benefit from what the pain center has to offer. I have discussed with the  patient today the diagnosis and treatment recommendations.  We reviewed the natural progression of this problem at great length.  Some possible benefits and detriments of physical therapy, chiropractic care, medication management, behavorial therapy, anti-inflammatory diet and other alternative treatments were discussed.  We also discussed future possible treatment options in a stepwise fashion, including interventional pain procedures and injections and possible surgical referral if needed.      The patient understands that pain medication is not prescribed during the initial patient consultation at the pain center. If the patient is on pain medications for chronic pain, the PCP or prescribing provider may choose  to prescribe until the patient presents for follow up at the pain center. Medication prescriptions provided by the pain center, will depend on the UA results, safe prescribing practices established by the [facility] and patient response to their current treatment regimen at the follow up visit.      Current MME- 112.5     Patient set goals today in the office to achieve with the pain centers help. They are:  1. Control pain during her ongoing process of ankle fusion and healing, increase functionality     Plan Interventions recommended today:  Assessment tool- MIKE Score: 0  NDI Score: 0      Follow up in 2 weeks, we will discuss a pain treatment plan at that time, which may include narcotics and alternative therapies.     Continue your current regimen of alleviating factors    Please see your current provider for any continued prescription, they may choose to provide you prescriptions of your  narcotics until we have your urine screen back. They will continue to manage your general health and have requested you see the pain center for pain management. Please discuss any health concerns with your PCP     [place] Precautions are taken with every patient which includes a  report and drug screen. A UA will be taken today for baseline screening.     Refer you to a nutritionist due to patients inability to heal from multiple surgeries.     Dexa scan due to low impact fracture and delay in healing as well as years of smoking.     Vitamin D level lab today     Will start weaning you off of your oxycodone at the next visit and begin a longer acting pain medication in anticipation of your next surgery     Will try CBD oils for inflammation available online at [employer]- classic hemp oil or classic CBD cost is approximately 27.00 plus shipping     Behavioral Therapy- please get an intake packet and fill out to start treatment for your anxiety in regards to your chronic pain     Anticipating the upcoming surgery will likely transition you to a long acting pain medication and a different short acting pain medication since the oxycodone is not working for you. ( layla Vance) agreed that the podiatrist will likely prescribe medications for you after surgery, this is expected, however the pain center is here to support you through this process and will assist 2-3 weeks after surgery if needed. This is based on the results of your UDT    Non-opoid medical management includes- avoid any IBU or NSaid medications     Education was provided to the patient today in regards to their specific diagnosis.    Orders placed today   Orders Placed This Encounter   Procedures   ? DXA Bone Density Scan     Standing Status:   Future     Standing Expiration Date:   3/22/2019     Order Specific Question:   Reason for Exam (Describe Symptoms):     Answer:   low impact fracture that is now a non-unuin, hx of smoking.      Order Specific Question:   Is the patient pregnant?     Answer:   No     Order Specific Question:   Can the procedure be changed per Radiologist protocol?     Answer:   Yes   ? Vitamin D, Total (25-Hydroxy)     Standing Status:   Standing     Number of Occurrences:   1   ? Ambulatory referral to Nutrition Services     Referral Priority:   Routine     Referral Type:   Health Education     Referral Reason:   Evaluation and Treatment     Requested Specialty:   Nutrition     Number of Visits Requested:   1       Patient reminders:   Diagnostics: UDT/SWAB collected 3/22/2018 and results are pending.  UDT/SWAB:  Patient required a random Urine Drug Testing, due to the need to comply with [place] Model Policy Guidelines and CDC Guideline for the use of any controlled substances. This is to ensure that patient is compliant with treatment, and monitor for risks such as diversion, abuse, or any other aberrant behaviors. Patient is either being considered for or taking a controlled substance. Unexpected findings will be discussed and treatment decision may be adjusted. Testing is being implemented across the board randomly w/o bias related to age, race, gender, socioeconomic status or Catholic affiliation.     SAFETY REMINDERS  No alcohol while taking controlled substances. Alcohol is not an illegal substance, it is unsafe to use in combination. It is a build up of substances in the body that can be extremely hazardous and may cause respirations to slow to a dangerous rate resulting in hospitalization, brain damage, or death.    Opioid medications have been associated with sharp rise in unintentional overdose and death.  Overdose is a condition characterized by the consumption in excess of a particular drug causing adverse effects. This can happen b/c you are sick, accidentally or intentionally took an extra dose, are on multiple medication that can interact. Someone took your medication and they are not use to the  medication.  Symptoms of overdose include:   !breathing slow and shallow, erratic or not at all  !pinpoint pupils, hallucinations  !confusion  !muscle jerks, slack muscles   !extreme sleepiness or loss of alertness   !awake but not able to talk   !face pale or clammy, vomiting, for lighter skinned people, the skin tone turns bluish purple, for darker skinned people, it turns grayish or ashen   If in a situation where overdose is a concern engage the emergency response system (dial 911).    In one study it was noted that 80% of unintentional overdoses occurred in people who were taking a combination of opioids and benzodiazepines.    Do not sell, loan, borrow or share your opioid medication with anyone. Deaths have occurred as a result of this practice. It is illegal and patients are being prosecuted.     Prevent unexpected access/loss of medication: Keep medication locked. Only carry what you need with you.    The patient agrees to the plan and has no further questions, if questions arise the patient knows to call [phone].       Thank you for this consult and opportunity to assist with this patients care.    Celeste Perales, [facility]  [address]  [place] [address]  Ph: [phone]  Fax: [phone]

## 2021-06-29 NOTE — PROGRESS NOTES
"Progress Notes by Kaley Taveras LPN at 6/29/2020  8:40 AM     Author: Kaley Taveras LPN Service: -- Author Type: Licensed Nurse    Filed: 6/30/2020  9:43 AM Date of Service: 6/29/2020  8:40 AM Status: Signed    : Kaley Taveras LPN (Licensed Nurse)       Jaime L Franklin Behrends is a 54 y.o. female who is being evaluated via a billable telephone visit.      The patient has been notified of following:     \"This telephone visit will be conducted via a call between you and your physician/provider. We have found that certain health care needs can be provided without the need for a physical exam.  This service lets us provide the care you need with a short phone conversation.  If a prescription is necessary we can send it directly to your pharmacy.  If lab work is needed we can place an order for that and you can then stop by our lab to have the test done at a later time.    Telephone visits are billed at different rates depending on your insurance coverage. During this emergency period, for some insurers they may be billed the same as an in-person visit.  Please reach out to your insurance provider with any questions.    If during the course of the call the physician/provider feels a telephone visit is not appropriate, you will not be charged for this service.\"    Patient has given verbal consent to a Telephone visit? Yes    What phone number would you like to be contacted at? [phone]    Patient would like to receive their AVS by AVS Preference: Mail a copy.    Patient is here for a follow up appointment with Dr. Ramirez. Patient has mental health appointment. Patient needs refills for Tizanidine. CSA, MIKE, NDI and UDT are deferred due to COVID 19.         Kaley Taveras LPN         "

## 2021-07-01 ENCOUNTER — HOSPITAL ENCOUNTER (OUTPATIENT)
Dept: PALLIATIVE MEDICINE | Facility: OTHER | Age: 56
Discharge: HOME | End: 2021-07-01
Attending: ANESTHESIOLOGY
Payer: MEDICAID

## 2021-07-01 DIAGNOSIS — G89.4 CHRONIC PAIN SYNDROME: ICD-10-CM

## 2021-07-03 NOTE — ADDENDUM NOTE
Addendum Note by Celeste Perales CNP at 10/10/2018  4:29 PM     Author: Celeste Perales CNP Service: -- Author Type: Nurse Practitioner    Filed: 10/10/2018  4:29 PM Encounter Date: 10/10/2018 Status: Signed    : Celeste Perales CNP (Nurse Practitioner)    Addended by: CELESTE PERALES on: 10/10/2018 04:29 PM        Modules accepted: Orders

## 2021-07-03 NOTE — ADDENDUM NOTE
Addendum Note by Kelsey Valle RN at 10/10/2018  3:41 PM     Author: Kelsey Valle RN Service: -- Author Type: Registered Nurse    Filed: 10/10/2018  3:41 PM Encounter Date: 10/10/2018 Status: Signed    : Kelsey Valle RN (Registered Nurse)    Addended by: KELSEY VALLE on: 10/10/2018 03:41 PM        Modules accepted: Orders

## 2021-07-03 NOTE — ADDENDUM NOTE
Addendum Note by Kelsey Valle RN at 6/5/2019  1:35 PM     Author: Kelsey Valle RN Service: -- Author Type: Registered Nurse    Filed: 6/5/2019  1:35 PM Encounter Date: 6/4/2019 Status: Signed    : Kelsey Valle RN (Registered Nurse)    Addended by: KELSEY VALLE on: 6/5/2019 01:35 PM        Modules accepted: Orders

## 2021-07-03 NOTE — ADDENDUM NOTE
Addendum Note by Celeste Perales CNP at 6/15/2018  4:39 PM     Author: Celeste Perales CNP Service: -- Author Type: Nurse Practitioner    Filed: 6/15/2018  4:39 PM Encounter Date: 6/15/2018 Status: Signed    : Celeste Perales CNP (Nurse Practitioner)    Addended by: CELESTE PERALES on: 6/15/2018 04:39 PM        Modules accepted: Orders

## 2021-07-03 NOTE — ADDENDUM NOTE
Addendum Note by Kelsey Valle RN at 8/14/2020  3:50 PM     Author: Kelsey Valle RN Service: -- Author Type: Registered Nurse    Filed: 8/14/2020  3:50 PM Encounter Date: 7/28/2020 Status: Signed    : Kelsey Valle RN (Registered Nurse)    Addended by: KELSEY VALLE on: 8/14/2020 03:50 PM        Modules accepted: Orders

## 2021-07-03 NOTE — ADDENDUM NOTE
Addendum Note by Kelsye Valle RN at 10/11/2018  2:55 PM     Author: Kelsey Valle RN Service: -- Author Type: Registered Nurse    Filed: 10/11/2018  2:55 PM Encounter Date: 10/10/2018 Status: Signed    : Kelsey Valle RN (Registered Nurse)    Addended by: KELSEY VALLE on: 10/11/2018 02:55 PM        Modules accepted: Orders

## 2021-07-03 NOTE — ADDENDUM NOTE
Addendum Note by Erinn Maynard CMA at 10/8/2018 11:59 PM     Author: Erinn Maynard CMA Service: -- Author Type: Certified Medical Assistant    Filed: 10/22/2018 11:47 AM Date of Service: 10/8/2018 11:59 PM Status: Signed    : Erinn Maynard CMA (Certified Medical Assistant)    Encounter addended by: Erinn Maynard CMA on: 10/22/2018 11:47 AM<BR>     Actions taken: Charge Capture section accepted

## 2021-07-03 NOTE — ADDENDUM NOTE
Addendum Note by Kelsey Valle RN at 10/11/2018  2:29 PM     Author: Kelsey Valle RN Service: -- Author Type: Registered Nurse    Filed: 10/11/2018  2:29 PM Encounter Date: 10/10/2018 Status: Signed    : Kelsey Valle RN (Registered Nurse)    Addended by: KELSEY VALLE on: 10/11/2018 02:29 PM        Modules accepted: Orders

## 2021-07-03 NOTE — ADDENDUM NOTE
Addendum Note by Lizbeth Rodriguez at 10/28/2020 12:40 PM     Author: Lizbeth Rodriguez Service: -- Author Type: --    Filed: 10/30/2020 10:40 AM Date of Service: 10/28/2020 12:40 PM Status: Signed    : Lizbeth Rodriguez    Encounter addended by: Lizbeth Rodriguez on: 10/30/2020 10:40 AM      Actions taken: Charge Capture section accepted

## 2021-07-03 NOTE — ADDENDUM NOTE
Addendum Note by Lizbeth Rodriguez at 6/29/2020  8:40 AM     Author: Lizbeth Rodriguez Service: -- Author Type: --    Filed: 7/1/2020  8:43 AM Date of Service: 6/29/2020  8:40 AM Status: Signed    : Lizbeth Rodriguez    Encounter addended by: Lizbeth Rodriguez on: 7/1/2020  8:43 AM      Actions taken: Charge Capture section accepted

## 2021-07-03 NOTE — ADDENDUM NOTE
Addendum Note by Miladys De Jesus MD at 6/5/2019  2:08 PM     Author: Miladys De Jesus MD Service: -- Author Type: Physician    Filed: 6/5/2019  2:08 PM Encounter Date: 6/4/2019 Status: Signed    : Miladys De Jesus MD (Physician)    Addended by: MILADYS DE JESUS on: 6/5/2019 02:08 PM        Modules accepted: Orders

## 2021-07-04 NOTE — TELEPHONE ENCOUNTER
Telephone Encounter by Veronica Marshall at 6/7/2021  3:51 PM     Author: Veronica Marshall Service: -- Author Type: --    Filed: 6/7/2021  3:51 PM Encounter Date: 6/7/2021 Status: Signed    : Veronica Marshall APPROVED:    Approval start date: 6/1/2021  Approval end date:  5/26/2022    Pharmacy has been notified of approval and will contact patient when medication is ready for pickup.

## 2021-07-04 NOTE — ADDENDUM NOTE
Addendum Note by Lizbeth Rodriguez at 2/10/2021  2:00 PM     Author: Lizbeth Rodriguez Service: -- Author Type: --    Filed: 2/12/2021  9:40 AM Date of Service: 2/10/2021  2:00 PM Status: Signed    : Lizbeth Rodriguez    Encounter addended by: Lizbeth Rodriguez on: 2/12/2021  9:40 AM      Actions taken: Charge Capture section accepted

## 2021-07-06 ENCOUNTER — COMMUNICATION - HEALTHEAST (OUTPATIENT)
Dept: PALLIATIVE MEDICINE | Facility: OTHER | Age: 56
End: 2021-07-06

## 2021-07-06 NOTE — TELEPHONE ENCOUNTER
Telephone Encounter by Lauren Xie RN at 7/6/2021  4:14 PM     Author: Lauren Xie RN Service: -- Author Type: Registered Nurse    Filed: 7/6/2021  4:20 PM Encounter Date: 7/6/2021 Status: Signed    : Lauren Xie RN (Registered Nurse)       Patient leaves a message on triage line requesting a refill of Hydrocodone and apologizes for missing appointment on 7/1/21.  Patient states she was moving and forgot about the appointment.  Patient has rescheduled appointment for 7/21/21.    Review of Chart:  7/1/21 missed appointment notes   56 y.o. female was scheduled for virtual visit today.  Was contacted twice by staff.  Did not show.     Reviewing  filled hydrocodone last 6/17 for #30, we are giving her #30 in 1 month for breakthrough pain.  Of concern she is in the methadone program.  Previous hydrocodone was filled on 5/18.     As did not show today,  Will not refill the hydrocodone until I have not heard from patient and coordinated with her methadone clinic her no concerns given the no-show.     Reviewing care everywhere working with her primary care provider around dental infections.    :  06/17/2021 Hydrocodone-Acetamin 5-325 Mg Qty: 30 for 7 days [place] (Due 7/17/21)    Will defer to provider for when he gets back which is prior to being due per the notes above.

## 2021-07-08 ENCOUNTER — COMMUNICATION - HEALTHEAST (OUTPATIENT)
Dept: PALLIATIVE MEDICINE | Facility: OTHER | Age: 56
End: 2021-07-08

## 2021-07-08 DIAGNOSIS — G89.4 CHRONIC PAIN SYNDROME: ICD-10-CM

## 2021-07-08 NOTE — TELEPHONE ENCOUNTER
Telephone Encounter by Monika Rutledge at 7/8/2021  2:01 PM     Author: Monika Rutledge Service: -- Author Type: Patient Access    Filed: 7/8/2021  2:01 PM Encounter Date: 7/8/2021 Status: Signed    : Monika Rutledge (Patient Access)       Scheduled for 7/21/2021 (in [facility])

## 2021-07-08 NOTE — TELEPHONE ENCOUNTER
Telephone Encounter by Giselle Mcconnell CNP at 7/8/2021  5:17 PM     Author: Giselle Mcconnell CNP Service: -- Author Type: Nurse Practitioner    Filed: 7/8/2021  5:19 PM Encounter Date: 7/8/2021 Status: Signed    : Giselle Mcconnell CNP (Nurse Practitioner)       Completed  Requested Prescriptions     Signed Prescriptions Disp Refills   ? tiZANidine (ZANAFLEX) 4 MG tablet 56 tablet 3     Sig: Take 4 tablets (16 mg total) by mouth at bedtime as needed, may repeat once (after 4 hours).     Authorizing Provider: GISELLE MCCONNELL     Please reach out to the pt to get him rescheduled

## 2021-07-08 NOTE — TELEPHONE ENCOUNTER
Telephone Encounter by Kaley Taveras LPN at 7/8/2021 11:56 AM     Author: Kaley Taveras LPN Service: -- Author Type: Licensed Nurse    Filed: 7/8/2021 12:02 PM Encounter Date: 7/8/2021 Status: Signed    : Kaley Taveras LPN (Licensed Nurse)       Medication being requested: Tizanidine 4 mg tablets  Last visit date: 5/7/2021 Provider: [facility]  Next visit date: 7/1/2021 (no show) Provider: [facility]  Expected follow up: 2 months  MTM visit ([facility]) date: n/a  Pertinent between visit information about requested medication (telephone, mychart, prior authorization, concerns): No show on 7/1/2021 and appt not rescheduled; med refills  Last date prescribed: 6/13/2021  Provider responsible: [facility]  Spoke with patient: no  Script being sent to provider - dates and quantity:   Requested Prescriptions     Pending Prescriptions Disp Refills   ? tiZANidine (ZANAFLEX) 4 MG tablet 240 tablet 3     Sig: four tabs bedtime and may repeat after four hours     Pharmacy cued: [facility] in [place]

## 2021-07-13 ENCOUNTER — TELEPHONE (OUTPATIENT)
Dept: PALLIATIVE MEDICINE | Facility: OTHER | Age: 56
End: 2021-07-13

## 2021-07-13 RX ORDER — HYDROCODONE BITARTRATE AND ACETAMINOPHEN 5; 325 MG/1; MG/1
1 TABLET ORAL
COMMUNITY
Start: 2021-06-01 | End: 2021-09-16

## 2021-07-13 NOTE — TELEPHONE ENCOUNTER
RN spoke to patient and patient understand that provider will not refill her Rx for Norco until this is discussed at her next appointment on 7/21/21.  RN and patient did discuss if patient feel she is going through withdrawals to please give us a call and we will send something in for withdrawal symptoms.  Patient denies any further questions or concerns at this time.

## 2021-07-13 NOTE — TELEPHONE ENCOUNTER
Patient leaves a message on triage line requesting a refill of Hydrocodone and apologizes for missing appointment on 7/1/21.  Patient states she was moving and forgot about the appointment.  Patient has rescheduled appointment for 7/21/21.     Review of Chart:  7/1/21 missed appointment notes   56 y.o. female was scheduled for virtual visit today.  Was contacted twice by staff.  Did not show.     Reviewing  filled hydrocodone last 6/17 for #30, we are giving her #30 in 1 month for breakthrough pain.  Of concern she is in the methadone program.  Previous hydrocodone was filled on 5/18.     As did not show today,  Will not refill the hydrocodone until I have not heard from patient and coordinated with her methadone clinic her no concerns given the no-show.     Reviewing care everywhere working with her primary care provider around dental infections.     :  06/17/2021 Hydrocodone-Acetamin 5-325 Mg Qty: 30 for 7 days [place] (Due 7/17/21)     Will defer to provider for when he gets back which is prior to being due per the notes above.

## 2021-07-20 PROCEDURE — 999N001193 HC VIDEO/TELEPHONE VISIT; NO CHARGE: Performed by: ANESTHESIOLOGY

## 2021-07-21 ENCOUNTER — VIRTUAL VISIT (OUTPATIENT)
Dept: PALLIATIVE MEDICINE | Facility: OTHER | Age: 56
End: 2021-07-21
Payer: MEDICAID

## 2021-07-21 VITALS — BODY MASS INDEX: 27.88 KG/M2 | HEIGHT: 67 IN

## 2021-07-21 DIAGNOSIS — G89.29 CHRONIC PAIN OF LEFT ANKLE: Primary | ICD-10-CM

## 2021-07-21 DIAGNOSIS — M25.572 CHRONIC PAIN OF LEFT ANKLE: Primary | ICD-10-CM

## 2021-07-21 PROCEDURE — 999N001193 HC VIDEO/TELEPHONE VISIT; NO CHARGE: Performed by: ANESTHESIOLOGY

## 2021-07-21 PROCEDURE — 99441 PR PHYSICIAN TELEPHONE EVALUATION 5-10 MIN: CPT | Performed by: ANESTHESIOLOGY

## 2021-07-21 RX ORDER — LAMOTRIGINE 100 MG/1
TABLET ORAL
COMMUNITY
Start: 2021-05-03 | End: 2022-02-01

## 2021-07-21 RX ORDER — AMLODIPINE BESYLATE 10 MG/1
TABLET ORAL
COMMUNITY
Start: 2021-06-09 | End: 2024-08-02

## 2021-07-21 RX ORDER — ERGOCALCIFEROL 1.25 MG/1
CAPSULE ORAL
COMMUNITY
Start: 2021-06-07 | End: 2024-08-02

## 2021-07-21 RX ORDER — PROCHLORPERAZINE MALEATE 5 MG
TABLET ORAL PRN
COMMUNITY
Start: 2020-06-23 | End: 2022-04-05

## 2021-07-21 RX ORDER — METHADONE HYDROCHLORIDE 10 MG/ML
150 CONCENTRATE ORAL
COMMUNITY
End: 2024-08-02

## 2021-07-21 RX ORDER — DULOXETIN HYDROCHLORIDE 60 MG/1
CAPSULE, DELAYED RELEASE ORAL
COMMUNITY
Start: 2021-05-07 | End: 2021-12-08

## 2021-07-21 RX ORDER — ACETAMINOPHEN 325 MG/1
650 TABLET ORAL EVERY 4 HOURS PRN
COMMUNITY
Start: 2019-08-31 | End: 2022-04-05

## 2021-07-21 RX ORDER — NICOTINE 21 MG/24HR
PATCH, TRANSDERMAL 24 HOURS TRANSDERMAL PRN
COMMUNITY
Start: 2021-07-09 | End: 2021-09-16

## 2021-07-21 RX ORDER — FERROUS SULFATE 325(65) MG
TABLET ORAL
COMMUNITY
Start: 2020-12-03 | End: 2022-04-05

## 2021-07-21 RX ORDER — NICOTINE 21 MG/24HR
1 PATCH, TRANSDERMAL 24 HOURS TRANSDERMAL PRN
COMMUNITY
Start: 2020-08-24 | End: 2021-10-11

## 2021-07-21 RX ORDER — VARENICLINE TARTRATE 1 MG/1
TABLET, FILM COATED ORAL
COMMUNITY
End: 2021-10-11

## 2021-07-21 RX ORDER — AMOXICILLIN 250 MG
1 CAPSULE ORAL 2 TIMES DAILY PRN
COMMUNITY
Start: 2021-05-07 | End: 2021-10-20

## 2021-07-21 RX ORDER — GABAPENTIN 600 MG/1
1 TABLET ORAL 2 TIMES DAILY
COMMUNITY
Start: 2021-05-07 | End: 2021-09-28

## 2021-07-21 ASSESSMENT — PAIN SCALES - GENERAL: PAINLEVEL: MODERATE PAIN (5)

## 2021-07-21 NOTE — PROGRESS NOTES
"Govind is a 56 year old who is being evaluated via a billable telephone visit.      What phone number would you like to be contacted at?   How would you like to obtain your AVS?         Subjective   Govind is a 56 year old who presents for the following health issue    HPI   reviewing the record had an appointment on 7/1 and did not show.    She reviews today \"pretty good\" otherwise.  She is preparing for surgery for a hiatal hernia, will have an endoscopy.    She did see her orthopedist regarding the right foot.  She is frustrated that that surgeon would not consider any other surgery intervention.  Surgeon wants the left foot to be addressed.  Amputation has been the next step offered.  She notes she has had 8 surgeries on that foot.  She cannot stand for more than 30 minutes for she must sit.  They are discussing amputation then a prosthesis.  She notes now great difficulty walking with her right foot.    She continues on the gabapentin 600 mg 4 times a day may be some benefit.    She continues going to the methadone program would like to stop that feeling that it is lost its effect.  She went without blocking the hydrocodone got limited affect.  She had been on the Suboxone in the past.    She is doing otherwise very well in terms of air agoraphobia and anxiety with the medical cannabis, that does not help as much with the pain.    Reviewing the  I did last give some hydrocodone 6/17 #30, did not renew his failed appointment and was not discussed today.      Current Outpatient Medications:      acetaminophen (TYLENOL) 325 MG tablet, Take 650 mg by mouth every 4 hours as needed, Disp: , Rfl:      amLODIPine (NORVASC) 10 MG tablet, amlodipine 10 mg tablet  TAKE 1 TABLET BY MOUTH EVERY DAY, Disp: , Rfl:      ARIPiprazole (ABILIFY) 2 MG tablet, Take 1 tablet (2 mg) by mouth daily, Disp: 30 tablet, Rfl: 0     buPROPion (WELLBUTRIN XL) 300 MG 24 hr tablet, Take 1 tablet (300 mg) by mouth daily, Disp: 30 tablet, Rfl: " 0     DULoxetine (CYMBALTA) 60 MG capsule, duloxetine 60 mg capsule,delayed release  TAKE 2 CAPSULES BY MOUTH EVERY MORNING AND 1 CAPSULE BY MOUTH EVERY NIGHT AT BEDTIME, Disp: , Rfl:      ergocalciferol (ERGOCALCIFEROL) 1.25 MG (01153 UT) capsule, ergocalciferol (vitamin D2) 1,250 mcg (50,000 unit) capsule  TAKE ONE CAPSULE BY MOUTH EVERY MONDAY AND THURSDAY, Disp: , Rfl:      ferrous sulfate (FEROSUL) 325 (65 Fe) MG tablet, ferrous sulfate 325 mg (65 mg iron) tablet  TK 1 T PO QD. TAKE WITH A MEAL., Disp: , Rfl:      gabapentin (NEURONTIN) 600 MG tablet, Take 1 tablet by mouth 2 times daily, Disp: , Rfl:      HYDROcodone-acetaminophen (NORCO) 5-325 MG tablet, Take 1 tablet by mouth, Disp: , Rfl:      Lactobacillus Rhamnosus, GG, ( PROBIOTIC DIGESTIVE CARE) CAPS, Take 1 capsule by mouth daily as needed, Disp: , Rfl:      lamoTRIgine (LAMICTAL) 100 MG tablet, TAKE 1 TABLET BY MOUTH EVERY MORNING AND 1/2 TABLET BY MOUTH EVERY NIGHT AT BEDTIME, Disp: , Rfl:      medical cannabis (Patient's own supply), 1 Dose by Other route Vaping 5 puffs inhaled 4-5 times a day, prn, Disp: , Rfl:      methadone (DOLOPHINE-INTENSOL) 10 MG/ML (HIGH CONC) solution, Take 150 mg by mouth, Disp: , Rfl:      omeprazole (PRILOSEC) 20 MG capsule, Take 1 capsule (20 mg) by mouth every morning, Disp: 30 capsule, Rfl: 0     prochlorperazine (COMPAZINE) 5 MG tablet, Take by mouth as needed, Disp: , Rfl:      senna-docusate (SENOKOT-S/PERICOLACE) 8.6-50 MG tablet, Take 1 tablet by mouth 2 times daily as needed, Disp: , Rfl:      tiZANidine (ZANAFLEX) 4 MG tablet, Take 4 tablets by mouth At Bedtime May repeat after 4 hours, 4 tablets, Disp: , Rfl:      vitamin B-12 (CYANOCOBALAMIN) 1000 MCG tablet, cyanocobalamin (vit B-12) 1,000 mcg tablet  TAKE 1 TABLET BY MOUTH EVERY DAY, Disp: , Rfl:      nicotine (NICODERM CQ) 14 MG/24HR 24 hr patch, Place 1 patch onto the skin as needed (Patient not taking: Reported on 7/21/2021), Disp: , Rfl:      nicotine  (NICODEREFREM TRAN) 21 MG/24HR 24 hr patch, as needed (Patient not taking: Reported on 7/21/2021), Disp: , Rfl:      varenicline (CHANTIX) 1 MG tablet, Chantix 1 mg tablet (Patient not taking: Reported on 7/21/2021), Disp: , Rfl:     By telephone sounds alert, clear sensorium.  Thought process logical.  Affect is fairly full range.        Review of Systems      Objective    Vitals - Patient Reported  Pain Score: Moderate Pain (5)  Pain Loc: Foot (focus on mental health and feet )        Assessment: Chronic pain includes lower extremity pain with multiple surgeries.  There is discussion of amputation next.  She does not want to contemplate that wonders what other options.    Plan, discussed evaluation with a different orthopedic system.    Reviewed given that such interventions are considered as Tatian, did discuss whether she might be a candidate for spinal cord stimulator to help with pain modulation, though may not necessarily help improve function.  She is at its pursuing.    I discussed approaches such as ketamine given that she feels limited benefit from the methadone.  Indicated I would want to discuss with her subs abuse counselor alcohol who is Jose Rodriguez.    Total time more than 5 minutes.

## 2021-07-21 NOTE — PROGRESS NOTES
Govind is a 56 year old who is being evaluated via a billable telephone visit.      What phone number would you like to be contacted at? [phone]  How would you like to obtain your AVS? Mail a copy  Pain score: 5  Constant or intermittent: constant  What does your pain feel like:charp and throbbing  Does the pain interfere with:   Work: yes  Walking/distance: yes  Sleep: yes  Daily activities: no  Relationships/social life: no  Mood: yes  F= 7     Kaley Taveras LPN

## 2021-07-21 NOTE — LETTER
"    7/21/2021         RE: Jaime L Franklin Behrends  [address] [place] [address]        Dear Colleague,    Thank you for referring your patient, Jaime L Franklin Behrends, to the [facility]. Please see a copy of my visit note below.    Govind is a 56 year old who is being evaluated via a billable telephone visit.      What phone number would you like to be contacted at? [phone]  How would you like to obtain your AVS? Mail a copy  Pain score: 5  Constant or intermittent: constant  What does your pain feel like:charp and throbbing  Does the pain interfere with:   Work: yes  Walking/distance: yes  Sleep: yes  Daily activities: no  Relationships/social life: no  Mood: yes  F= 7     Kaley Taveras LPN    Govind is a 56 year old who is being evaluated via a billable telephone visit.      What phone number would you like to be contacted at?   How would you like to obtain your AVS?         Subjective   Govind is a 56 year old who presents for the following health issue    HPI   reviewing the record had an appointment on 7/1 and did not show.    She reviews today \"pretty good\" otherwise.  She is preparing for surgery for a hiatal hernia, will have an endoscopy.    She did see her orthopedist regarding the right foot.  She is frustrated that that surgeon would not consider any other surgery intervention.  Surgeon wants the left foot to be addressed.  Amputation has been the next step offered.  She notes she has had 8 surgeries on that foot.  She cannot stand for more than 30 minutes for she must sit.  They are discussing amputation then a prosthesis.  She notes now great difficulty walking with her right foot.    She continues on the gabapentin 600 mg 4 times a day may be some benefit.    She continues going to the methadone program would like to stop that feeling that it is lost its effect.  She went without blocking the hydrocodone got limited affect.  She had been on the Suboxone in the " past.    She is doing otherwise very well in terms of air agoraphobia and anxiety with the medical cannabis, that does not help as much with the pain.    Reviewing the  I did last give some hydrocodone 6/17 #30, did not renew his failed appointment and was not discussed today.      Current Outpatient Medications:      acetaminophen (TYLENOL) 325 MG tablet, Take 650 mg by mouth every 4 hours as needed, Disp: , Rfl:      amLODIPine (NORVASC) 10 MG tablet, amlodipine 10 mg tablet  TAKE 1 TABLET BY MOUTH EVERY DAY, Disp: , Rfl:      ARIPiprazole (ABILIFY) 2 MG tablet, Take 1 tablet (2 mg) by mouth daily, Disp: 30 tablet, Rfl: 0     buPROPion (WELLBUTRIN XL) 300 MG 24 hr tablet, Take 1 tablet (300 mg) by mouth daily, Disp: 30 tablet, Rfl: 0     DULoxetine (CYMBALTA) 60 MG capsule, duloxetine 60 mg capsule,delayed release  TAKE 2 CAPSULES BY MOUTH EVERY MORNING AND 1 CAPSULE BY MOUTH EVERY NIGHT AT BEDTIME, Disp: , Rfl:      ergocalciferol (ERGOCALCIFEROL) 1.25 MG (36906 UT) capsule, ergocalciferol (vitamin D2) 1,250 mcg (50,000 unit) capsule  TAKE ONE CAPSULE BY MOUTH EVERY MONDAY AND THURSDAY, Disp: , Rfl:      ferrous sulfate (FEROSUL) 325 (65 Fe) MG tablet, ferrous sulfate 325 mg (65 mg iron) tablet  TK 1 T PO QD. TAKE WITH A MEAL., Disp: , Rfl:      gabapentin (NEURONTIN) 600 MG tablet, Take 1 tablet by mouth 2 times daily, Disp: , Rfl:      HYDROcodone-acetaminophen (NORCO) 5-325 MG tablet, Take 1 tablet by mouth, Disp: , Rfl:      Lactobacillus Rhamnosus, GG, ( PROBIOTIC DIGESTIVE CARE) CAPS, Take 1 capsule by mouth daily as needed, Disp: , Rfl:      lamoTRIgine (LAMICTAL) 100 MG tablet, TAKE 1 TABLET BY MOUTH EVERY MORNING AND 1/2 TABLET BY MOUTH EVERY NIGHT AT BEDTIME, Disp: , Rfl:      medical cannabis (Patient's own supply), 1 Dose by Other route Vaping 5 puffs inhaled 4-5 times a day, prn, Disp: , Rfl:      methadone (DOLOPHINE-INTENSOL) 10 MG/ML (HIGH CONC) solution, Take 150 mg by mouth, Disp: , Rfl:       omeprazole (PRILOSEC) 20 MG capsule, Take 1 capsule (20 mg) by mouth every morning, Disp: 30 capsule, Rfl: 0     prochlorperazine (COMPAZINE) 5 MG tablet, Take by mouth as needed, Disp: , Rfl:      senna-docusate (SENOKOT-S/PERICOLACE) 8.6-50 MG tablet, Take 1 tablet by mouth 2 times daily as needed, Disp: , Rfl:      tiZANidine (ZANAFLEX) 4 MG tablet, Take 4 tablets by mouth At Bedtime May repeat after 4 hours, 4 tablets, Disp: , Rfl:      vitamin B-12 (CYANOCOBALAMIN) 1000 MCG tablet, cyanocobalamin (vit B-12) 1,000 mcg tablet  TAKE 1 TABLET BY MOUTH EVERY DAY, Disp: , Rfl:      nicotine (NICODERM CQ) 14 MG/24HR 24 hr patch, Place 1 patch onto the skin as needed (Patient not taking: Reported on 7/21/2021), Disp: , Rfl:      nicotine (NICODERM CQ) 21 MG/24HR 24 hr patch, as needed (Patient not taking: Reported on 7/21/2021), Disp: , Rfl:      varenicline (CHANTIX) 1 MG tablet, Chantix 1 mg tablet (Patient not taking: Reported on 7/21/2021), Disp: , Rfl:     By telephone sounds alert, clear sensorium.  Thought process logical.  Affect is fairly full range.        Review of Systems      Objective    Vitals - Patient Reported  Pain Score: Moderate Pain (5)  Pain Loc: Foot (focus on mental health and feet )        Assessment: Chronic pain includes lower extremity pain with multiple surgeries.  There is discussion of amputation next.  She does not want to contemplate that wonders what other options.    Plan, discussed evaluation with a different orthopedic system.    Reviewed given that such interventions are considered as Tatian, did discuss whether she might be a candidate for spinal cord stimulator to help with pain modulation, though may not necessarily help improve function.  She is at its pursuing.    I discussed approaches such as ketamine given that she feels limited benefit from the methadone.  Indicated I would want to discuss with her subs abuse counselor alcohol who is Jose Rodriguez.    Total time more  than 5 minutes.                Again, thank you for allowing me to participate in the care of your patient.        Sincerely,        MILADYS DE JESUS MD

## 2021-07-21 NOTE — PATIENT INSTRUCTIONS
PLAN:    Contact [facility] for a second opinion.    You may schedule with Dr. Zelaya for evaluation for a spinal cord stimulator to see if helps your ankle pain.    Dr. Ramirez to contact your counselor alcoholic to discuss use of ketamine for pain, and you may review with him tapering methadone.    Follow-up Dr. Ramirez in 8 weeks.

## 2021-08-03 ENCOUNTER — TELEPHONE (OUTPATIENT)
Dept: PALLIATIVE MEDICINE | Facility: OTHER | Age: 56
End: 2021-08-03

## 2021-08-03 DIAGNOSIS — M62.838 MUSCLE SPASM: Primary | ICD-10-CM

## 2021-08-03 NOTE — TELEPHONE ENCOUNTER
Pending Prescriptions:                       Disp   Refills    tiZANidine (ZANAFLEX) 4 MG tablet         112 ta*3            Sig: Take 4 tablets (16 mg) by mouth At Bedtime for 14           days May repeat after 4 hours, 4 tablets    [facility]

## 2021-08-03 NOTE — TELEPHONE ENCOUNTER
Reason for call:  Medication   If this is a refill request, has the caller requested the refill from the pharmacy already? No  Will the patient be using a [facility]? No  Name of the pharmacy and phone number for the current request: [facility]    Name of the medication requested: Tizanidine    Other request: patient wondering why she is picking Rx up weekly and not monthly as it used to be.    Phone number to reach patient:  Cell number on file:  [phone]      Best Time:  na    Can we leave a detailed message on this number?  not stated    Travel screening: Not Applicable

## 2021-08-05 ENCOUNTER — TELEPHONE (OUTPATIENT)
Dept: PALLIATIVE MEDICINE | Facility: OTHER | Age: 56
End: 2021-08-05

## 2021-08-05 NOTE — TELEPHONE ENCOUNTER
Patient leaves a message on triage line requesting where the provider sent in the Rx for the compound medication they spoke of at the most recent visit.    7/21/21 Plan  Dr. Ramirez to contact your counselor alcoholic to discuss use of ketamine for pain, and you may review with him tapering methadone.    Dr. Ramirez, please advise.

## 2021-08-06 NOTE — TELEPHONE ENCOUNTER
RN spoke to patient to let her know that Dr. Ramirez has not received a call from her counselor yet.  Patient states her counselor has not returned any of her calls either so she will reach out to their office and try and get someone to give Dr. Ramirez a call regarding this.  Patient would like to know if she should start tapering her Methadone in the meantime because she would like to start the Ketamine if this is going to help with her pain and feet?  Dr. Ramirez, please advise.

## 2021-08-06 NOTE — TELEPHONE ENCOUNTER
RN tried reaching out to provider and received voicemail so left a message requesting a returned call.  When patient returns call we are informing her that Dr. Ramirez is ok with her to start tapering her Methadone if she would like.  There is not a tapering schedule put into place at this clinic as Dr. Ramirez is not the prescribing provider so she would need to talk to her other provider that prescribes the Methadone if she has not done so already.

## 2021-08-11 ENCOUNTER — TELEPHONE (OUTPATIENT)
Dept: PALLIATIVE MEDICINE | Facility: OTHER | Age: 56
End: 2021-08-11

## 2021-08-11 NOTE — TELEPHONE ENCOUNTER
Call back to patient:  She has a new counselor  Jhon Garcia  Phone number to reach the counselor: [phone]  Patient also reports she is currently down to 140 mg of methadone and that the clinic is weaning her by 10 mg weekly.  She is moving forward with this weaning process.  Patient asks about the potential for ketamine to be initiated.

## 2021-09-14 ENCOUNTER — MEDICAL CORRESPONDENCE (OUTPATIENT)
Dept: HEALTH INFORMATION MANAGEMENT | Facility: CLINIC | Age: 56
End: 2021-09-14

## 2021-09-16 ENCOUNTER — VIRTUAL VISIT (OUTPATIENT)
Dept: PALLIATIVE MEDICINE | Facility: OTHER | Age: 56
End: 2021-09-16
Payer: MEDICAID

## 2021-09-16 DIAGNOSIS — G89.29 CHRONIC PAIN OF LEFT ANKLE: Primary | ICD-10-CM

## 2021-09-16 DIAGNOSIS — M25.572 CHRONIC PAIN OF LEFT ANKLE: Primary | ICD-10-CM

## 2021-09-16 PROCEDURE — 99214 OFFICE O/P EST MOD 30 MIN: CPT | Mod: 95 | Performed by: ANESTHESIOLOGY

## 2021-09-16 RX ORDER — KETAMINE HCL 100 %
POWDER (GRAM) MISCELLANEOUS
Qty: 40 G | Refills: 1 | Status: SHIPPED | OUTPATIENT
Start: 2021-09-16 | End: 2021-10-11

## 2021-09-16 ASSESSMENT — PAIN SCALES - GENERAL: PAINLEVEL: MODERATE PAIN (5)

## 2021-09-16 NOTE — PATIENT INSTRUCTIONS
PLAN:  Schedule an appointment with Dr. Zelaya to discuss spinal cord stimulator trial.    Referral made to [facility] to see if they can address treatment and support for your foot and ankle pain.    Call placed to Jhon, your counselor at [facility], to discuss begetting the use of ketamine to help with pain.    Follow-up with Dr. Ramirez in the office in 8 weeks.

## 2021-09-16 NOTE — LETTER
9/16/2021         RE: Jaime L Franklin Behrends  [address] [place] [address]        Dear Colleague,    Thank you for referring your patient, Jaime L Franklin Behrends, to the [facility]. Please see a copy of my visit note below.    Pt is scheduled for a video visit for her chronic left foot and ankle    AVS: Mail please    [phone]- If call is dropped text or call this #      Pain score: 5  Constant -left foot and ankle  What does your pain feel like: burning, sharp, throbbing. Swells with activity  Does the pain interfere with:  Work: N/A  Walking/distance: Yes  Sleep: No  Daily activities: yes  Relationships/social life: yes  Mood: yes  F= 6      UDT/CSA 05/07/21           Govind is a 56 year old who is being evaluated via a billable video visit.      How would you like to obtain your AVS?   If the video visit is dropped, the invitation should be resent by:   Will anyone else be joining your video visit?       Video Start Time:         Subjective   Govind is a 56 year old who presents for the following health issues   Seen for lower extremity pain with multiple surgeries, and agoraphobia.  HPI     She reviews last seen have an evaluation with a [facility] orthopedic surgeon.  They did not want to do any further surgery for her ankle.  She has had multiple surgeries to [facility], no further revisions have been offered and amputation has been discussed which she declines.    Reviewed the role of a spinal cord stimulator which we had mentioned last time and she forgotten our discussion.  She would be interested in pursuing that.    I have placed a call to her counselor at the methadone clinic to discuss ketamine.  She reports she is changed to several counselors, now has one named Jhon.  I am not gotten a call back for the last.  She has brought that up with Jhon.  Reviewed the off label use of ketamine.  She has never had any addiction issues with any  hallucinogens.    Reports she is otherwise doing well, her mental health is stable on present medication regimen.  No particular side effects.    She has been tapered down to 140 on the methadone and expect to go down by 10 mg every week and she would like to get off of it, does not feel its been helpful.      Current Outpatient Medications:      acetaminophen (TYLENOL) 325 MG tablet, Take 650 mg by mouth every 4 hours as needed, Disp: , Rfl:      amLODIPine (NORVASC) 10 MG tablet, amlodipine 10 mg tablet  TAKE 1 TABLET BY MOUTH EVERY DAY, Disp: , Rfl:      ARIPiprazole (ABILIFY) 2 MG tablet, Take 1 tablet (2 mg) by mouth daily, Disp: 30 tablet, Rfl: 0     buPROPion (WELLBUTRIN XL) 300 MG 24 hr tablet, Take 1 tablet (300 mg) by mouth daily, Disp: 30 tablet, Rfl: 0     DULoxetine (CYMBALTA) 60 MG capsule, duloxetine 60 mg capsule,delayed release  TAKE 2 CAPSULES BY MOUTH EVERY MORNING AND 1 CAPSULE BY MOUTH EVERY NIGHT AT BEDTIME, Disp: , Rfl:      ergocalciferol (ERGOCALCIFEROL) 1.25 MG (31384 UT) capsule, ergocalciferol (vitamin D2) 1,250 mcg (50,000 unit) capsule  TAKE ONE CAPSULE BY MOUTH EVERY MONDAY AND THURSDAY, Disp: , Rfl:      ferrous sulfate (FEROSUL) 325 (65 Fe) MG tablet, ferrous sulfate 325 mg (65 mg iron) tablet  TK 1 T PO QD. TAKE WITH A MEAL., Disp: , Rfl:      gabapentin (NEURONTIN) 600 MG tablet, Take 1 tablet by mouth 2 times daily, Disp: , Rfl:      lamoTRIgine (LAMICTAL) 100 MG tablet, TAKE 1 TABLET BY MOUTH EVERY MORNING AND 1/2 TABLET BY MOUTH EVERY NIGHT AT BEDTIME, Disp: , Rfl:      medical cannabis (Patient's own supply), 1 Dose by Other route Vaping 5 puffs inhaled 4-5 times a day, prn, Disp: , Rfl:      methadone (DOLOPHINE-INTENSOL) 10 MG/ML (HIGH CONC) solution, Take 150 mg by mouth, Disp: , Rfl:      omeprazole (PRILOSEC) 20 MG capsule, Take 1 capsule (20 mg) by mouth every morning, Disp: 30 capsule, Rfl: 0     prochlorperazine (COMPAZINE) 5 MG tablet, Take by mouth as needed, Disp: ,  Rfl:      senna-docusate (SENOKOT-S/PERICOLACE) 8.6-50 MG tablet, Take 1 tablet by mouth 2 times daily as needed, Disp: , Rfl:      tiZANidine (ZANAFLEX) 4 MG tablet, , Disp: , Rfl:      vitamin B-12 (CYANOCOBALAMIN) 1000 MCG tablet, cyanocobalamin (vit B-12) 1,000 mcg tablet  TAKE 1 TABLET BY MOUTH EVERY DAY, Disp: , Rfl:      Lactobacillus Rhamnosus, GG, ( PROBIOTIC DIGESTIVE CARE) CAPS, Take 1 capsule by mouth daily as needed (Patient not taking: Reported on 9/16/2021), Disp: , Rfl:      nicotine (NICODERM CQ) 14 MG/24HR 24 hr patch, Place 1 patch onto the skin as needed (Patient not taking: Reported on 7/21/2021), Disp: , Rfl:      varenicline (CHANTIX) 1 MG tablet, Chantix 1 mg tablet (Patient not taking: Reported on 7/21/2021), Disp: , Rfl:       Review of Systems         Objective    Vitals - Patient Reported  Pain Score: Moderate Pain (5) (constant)  Pain Loc: Foot (Left foot and ankle)        Physical Exam     Plan: [facility] as I feel they have had some interesting approaches to some patient with complicated foot and ankle pain.    She will schedule Dr. Zelaya for evaluation of spinal cord stimulator trial to help with the foot and ankle pain.    Call placed to her counselor Jhon I have a whole lot place.    Total time more than 20 minutes      Discussion with her counselor Jhon at the methadone clinic.  We reviewed the use of ketamine and correlation with methadone.  She is comfortable from addiction care point of view with this being offered and will monitor course in the program.        Video-Visit Details    Type of service:  Video Visit    Video End Time:    Originating Location (pt. Location): Home    Distant Location (provider location):  [facility]     Platform used for Video Visit: Ghislaine      Again, thank you for allowing me to participate in the care of your patient.        Sincerely,        MILADYS DE JESUS MD

## 2021-09-16 NOTE — PROGRESS NOTES
Pt is scheduled for a video visit for her chronic left foot and ankle    AVS: Mail please    [phone]- If call is dropped text or call this #      Pain score: 5  Constant -left foot and ankle  What does your pain feel like: burning, sharp, throbbing. Swells with activity  Does the pain interfere with:  Work: N/A  Walking/distance: Yes  Sleep: No  Daily activities: yes  Relationships/social life: yes  Mood: yes  F= 6      UDT/CSA 05/07/21

## 2021-09-16 NOTE — PROGRESS NOTES
Govind is a 56 year old who is being evaluated via a billable video visit.      How would you like to obtain your AVS?   If the video visit is dropped, the invitation should be resent by:   Will anyone else be joining your video visit?       Video Start Time:         Imelda Faust is a 56 year old who presents for the following health issues   Seen for lower extremity pain with multiple surgeries, and agoraphobia.  HPI     She reviews last seen have an evaluation with a [facility] orthopedic surgeon.  They did not want to do any further surgery for her ankle.  She has had multiple surgeries to [facility], no further revisions have been offered and amputation has been discussed which she declines.    Reviewed the role of a spinal cord stimulator which we had mentioned last time and she forgotten our discussion.  She would be interested in pursuing that.    I have placed a call to her counselor at the methadone clinic to discuss ketamine.  She reports she is changed to several counselors, now has one named Jhon.  I am not gotten a call back for the last.  She has brought that up with Jhon.  Reviewed the off label use of ketamine.  She has never had any addiction issues with any hallucinogens.    Reports she is otherwise doing well, her mental health is stable on present medication regimen.  No particular side effects.    She has been tapered down to 140 on the methadone and expect to go down by 10 mg every week and she would like to get off of it, does not feel its been helpful.      Current Outpatient Medications:      acetaminophen (TYLENOL) 325 MG tablet, Take 650 mg by mouth every 4 hours as needed, Disp: , Rfl:      amLODIPine (NORVASC) 10 MG tablet, amlodipine 10 mg tablet  TAKE 1 TABLET BY MOUTH EVERY DAY, Disp: , Rfl:      ARIPiprazole (ABILIFY) 2 MG tablet, Take 1 tablet (2 mg) by mouth daily, Disp: 30 tablet, Rfl: 0     buPROPion (WELLBUTRIN XL) 300 MG 24 hr tablet, Take 1 tablet (300 mg) by  mouth daily, Disp: 30 tablet, Rfl: 0     DULoxetine (CYMBALTA) 60 MG capsule, duloxetine 60 mg capsule,delayed release  TAKE 2 CAPSULES BY MOUTH EVERY MORNING AND 1 CAPSULE BY MOUTH EVERY NIGHT AT BEDTIME, Disp: , Rfl:      ergocalciferol (ERGOCALCIFEROL) 1.25 MG (96273 UT) capsule, ergocalciferol (vitamin D2) 1,250 mcg (50,000 unit) capsule  TAKE ONE CAPSULE BY MOUTH EVERY MONDAY AND THURSDAY, Disp: , Rfl:      ferrous sulfate (FEROSUL) 325 (65 Fe) MG tablet, ferrous sulfate 325 mg (65 mg iron) tablet  TK 1 T PO QD. TAKE WITH A MEAL., Disp: , Rfl:      gabapentin (NEURONTIN) 600 MG tablet, Take 1 tablet by mouth 2 times daily, Disp: , Rfl:      lamoTRIgine (LAMICTAL) 100 MG tablet, TAKE 1 TABLET BY MOUTH EVERY MORNING AND 1/2 TABLET BY MOUTH EVERY NIGHT AT BEDTIME, Disp: , Rfl:      medical cannabis (Patient's own supply), 1 Dose by Other route Vaping 5 puffs inhaled 4-5 times a day, prn, Disp: , Rfl:      methadone (DOLOPHINE-INTENSOL) 10 MG/ML (HIGH CONC) solution, Take 150 mg by mouth, Disp: , Rfl:      omeprazole (PRILOSEC) 20 MG capsule, Take 1 capsule (20 mg) by mouth every morning, Disp: 30 capsule, Rfl: 0     prochlorperazine (COMPAZINE) 5 MG tablet, Take by mouth as needed, Disp: , Rfl:      senna-docusate (SENOKOT-S/PERICOLACE) 8.6-50 MG tablet, Take 1 tablet by mouth 2 times daily as needed, Disp: , Rfl:      tiZANidine (ZANAFLEX) 4 MG tablet, , Disp: , Rfl:      vitamin B-12 (CYANOCOBALAMIN) 1000 MCG tablet, cyanocobalamin (vit B-12) 1,000 mcg tablet  TAKE 1 TABLET BY MOUTH EVERY DAY, Disp: , Rfl:      Lactobacillus Rhamnosus, GG, ( PROBIOTIC DIGESTIVE CARE) CAPS, Take 1 capsule by mouth daily as needed (Patient not taking: Reported on 9/16/2021), Disp: , Rfl:      nicotine (NICODERM CQ) 14 MG/24HR 24 hr patch, Place 1 patch onto the skin as needed (Patient not taking: Reported on 7/21/2021), Disp: , Rfl:      varenicline (CHANTIX) 1 MG tablet, Chantix 1 mg tablet (Patient not taking: Reported on  7/21/2021), Disp: , Rfl:       Review of Systems         Objective    Vitals - Patient Reported  Pain Score: Moderate Pain (5) (constant)  Pain Loc: Foot (Left foot and ankle)        Physical Exam     Plan: [facility] as I feel they have had some interesting approaches to some patient with complicated foot and ankle pain.    She will schedule Dr. Zelaya for evaluation of spinal cord stimulator trial to help with the foot and ankle pain.    Call placed to her counselor Jhno I have a whole lot place.    Total time more than 20 minutes      Discussion with her counselor Jhon at the methadone clinic.  We reviewed the use of ketamine and correlation with methadone.  She is comfortable from addiction care point of view with this being offered and will monitor course in the program.        Video-Visit Details    Type of service:  Video Visit    Video End Time:    Originating Location (pt. Location): Home    Distant Location (provider location):  [facility]     Platform used for Video Visit: Ghislaine

## 2021-09-28 DIAGNOSIS — M25.572 CHRONIC PAIN OF LEFT ANKLE: Primary | ICD-10-CM

## 2021-09-28 DIAGNOSIS — G89.29 CHRONIC PAIN OF LEFT ANKLE: Primary | ICD-10-CM

## 2021-09-28 RX ORDER — GABAPENTIN 600 MG/1
600 TABLET ORAL 2 TIMES DAILY
Qty: 56 TABLET | Refills: 0 | Status: SHIPPED | OUTPATIENT
Start: 2021-09-28 | End: 2021-10-22

## 2021-09-28 NOTE — TELEPHONE ENCOUNTER
:   9/28/2021 Reviewed to aid with decision regarding medication management  Last script:  Date: 8/30/21  Medication: Gabapentin  Dose: 300mg   Dispensed: 56   Prescriber: BE    Completed  Signed Prescriptions:                        Disp   Refills    gabapentin (NEURONTIN) 600 MG tablet       56 tab*0        Sig: Take 1 tablet (600 mg) by mouth 2 times daily for 28           days  Authorizing Provider: ABIMAEL WOODS

## 2021-09-28 NOTE — TELEPHONE ENCOUNTER
Received call from patient requesting refill(s) of Gabapentin     Last dispensed from pharmacy on 8/30/21    Patient's last office/virtual visit by prescribing provider on 9/16/21  Next office/virtual appointment scheduled for 11/11/21    Last urine drug screen date 5/2021  Current opioid agreement on file (completed within the last year) Yes Date of opioid agreement: 5/2021    E-prescribe to [facility] pharmacy  Pending Prescriptions:                       Disp   Refills    gabapentin (NEURONTIN) 600 MG tablet      56 tab*0            Sig: Take 1 tablet (600 mg) by mouth 2 times daily for           28 days

## 2021-10-11 ENCOUNTER — TELEPHONE (OUTPATIENT)
Dept: PALLIATIVE MEDICINE | Facility: OTHER | Age: 56
End: 2021-10-11

## 2021-10-11 DIAGNOSIS — M25.572 CHRONIC PAIN OF LEFT ANKLE: ICD-10-CM

## 2021-10-11 DIAGNOSIS — G89.29 CHRONIC PAIN OF LEFT ANKLE: ICD-10-CM

## 2021-10-11 RX ORDER — KETAMINE HCL 100 %
POWDER (GRAM) MISCELLANEOUS
Qty: 40 G | Refills: 1 | Status: SHIPPED | OUTPATIENT
Start: 2021-10-11 | End: 2021-11-11

## 2021-10-11 RX ORDER — KETAMINE HCL 100 %
POWDER (GRAM) MISCELLANEOUS
Status: CANCELLED | OUTPATIENT
Start: 2021-10-11

## 2021-10-11 NOTE — TELEPHONE ENCOUNTER
Medication refill information reviewed.     Prescriptions prepped for review.     Will route to provider.     Please change directions if appropriate.  Patient is requesting an increase more than 1 lozenge 4 times per day.

## 2021-10-11 NOTE — TELEPHONE ENCOUNTER
Patient also stated they would like to increase the ketamine HCl POWD beyond one/4 times a day.     Azalea Soliman [facility] [place]

## 2021-10-11 NOTE — TELEPHONE ENCOUNTER
Received call from patient requesting refill(s) of ketamine HCl POWD     Last dispensed from pharmacy on 09/22/21 (confirmed with pharmacy)    Patient's last office/virtual visit by prescribing provider on 09/16/21    Next office/virtual appointment scheduled for 11/11/21    Last urine drug screen date 05/07/21    Current opioid agreement on file (completed within the last year) Yes Date of opioid agreement: 05/07/21    E-prescribe to: (Patient did not specify pharmacy but was last dispensed from here)  [facility]  [address] [place] [facility] [address]  Phone: [phone] Fax: [phone]    Will route to nursing pool for review and preparation of prescription(s).     Azalea Soliman [facility] [place]

## 2021-10-20 DIAGNOSIS — M25.572 CHRONIC PAIN OF LEFT ANKLE: Primary | ICD-10-CM

## 2021-10-20 DIAGNOSIS — G89.29 CHRONIC PAIN OF LEFT ANKLE: Primary | ICD-10-CM

## 2021-10-20 RX ORDER — AMOXICILLIN 250 MG
1 CAPSULE ORAL 2 TIMES DAILY PRN
Qty: 60 TABLET | Refills: 1 | Status: SHIPPED | OUTPATIENT
Start: 2021-10-20 | End: 2022-01-10

## 2021-10-20 NOTE — TELEPHONE ENCOUNTER
Received fax request from [facility] pharmacy requesting refill(s) for Stimulant 8.6-50 mg tablets    Last refilled on 9/13/2021    Pt last seen on 9/16/2021  Next appt scheduled for 11/11/2021    Will facilitate refill.

## 2021-10-22 DIAGNOSIS — G89.29 CHRONIC PAIN OF LEFT ANKLE: ICD-10-CM

## 2021-10-22 DIAGNOSIS — M25.572 CHRONIC PAIN OF LEFT ANKLE: ICD-10-CM

## 2021-10-22 RX ORDER — GABAPENTIN 600 MG/1
600 TABLET ORAL 2 TIMES DAILY
Qty: 56 TABLET | Refills: 0 | Status: SHIPPED | OUTPATIENT
Start: 2021-10-26 | End: 2021-11-11

## 2021-10-22 NOTE — TELEPHONE ENCOUNTER
Received call from patient requesting refill(s) of Gabapentin      Last dispensed from pharmacy on 9/28/21     Patient's last office/virtual visit by prescribing provider on 9/16/21  Next office/virtual appointment scheduled for 11/11/21     Last urine drug screen date 5/2021  Current opioid agreement on file (completed within the last year) Yes Date of opioid agreement: 5/2021     E-prescribe to [facility] pharmacy  Pending Prescriptions:                       Disp   Refills    gabapentin (NEURONTIN) 600 MG tablet      56 tab*0            Sig: Take 1 tablet (600 mg) by mouth 2 times daily for           28 days

## 2021-11-11 ENCOUNTER — MEDICAL CORRESPONDENCE (OUTPATIENT)
Dept: HEALTH INFORMATION MANAGEMENT | Facility: CLINIC | Age: 56
End: 2021-11-11

## 2021-11-11 ENCOUNTER — VIRTUAL VISIT (OUTPATIENT)
Dept: PALLIATIVE MEDICINE | Facility: OTHER | Age: 56
End: 2021-11-11
Payer: MEDICAID

## 2021-11-11 DIAGNOSIS — G89.29 CHRONIC PAIN OF LEFT ANKLE: ICD-10-CM

## 2021-11-11 DIAGNOSIS — M25.572 CHRONIC PAIN OF LEFT ANKLE: ICD-10-CM

## 2021-11-11 PROCEDURE — 99214 OFFICE O/P EST MOD 30 MIN: CPT | Mod: 95 | Performed by: ANESTHESIOLOGY

## 2021-11-11 PROCEDURE — G0463 HOSPITAL OUTPT CLINIC VISIT: HCPCS | Mod: PN,RTG | Performed by: ANESTHESIOLOGY

## 2021-11-11 RX ORDER — OXYTOCIN
POWDER (GRAM) MISCELLANEOUS
Qty: 30 G | Refills: 3 | Status: SHIPPED | OUTPATIENT
Start: 2021-11-11 | End: 2022-01-04

## 2021-11-11 RX ORDER — GABAPENTIN 600 MG/1
600 TABLET ORAL 2 TIMES DAILY
Qty: 56 TABLET | Refills: 0 | Status: SHIPPED | OUTPATIENT
Start: 2021-11-11 | End: 2021-12-22

## 2021-11-11 ASSESSMENT — PAIN SCALES - GENERAL: PAINLEVEL: SEVERE PAIN (7)

## 2021-11-11 NOTE — PROGRESS NOTES
"Govind is a 56 year old who is being evaluated via a billable video visit.          How would you like to obtain your AVS?   If the video visit is dropped, the invitation should be resent by:   Will anyone else be joining your video visit?       Video Start Time:         Subjective   Govind is a 56 year old who presents for the following health issues     Including lower extremity pain after multiple surgeries.    HPI   Reviews the ketamine seem to make her tired and also make her gag and throw up, did not help with pain.    Describes getting a letter regarding Dr. Zelaya and retired.  Names of other providers for the spinal cord stimulator.  She describes she is still trying to \"wrap my head around amputation\".  Reviewed the we had the discussion that a spinal cord stimulator trial would be an option to see if helps with pain and not have to consider amputation.  She had forgotten that discussion.    Similarly did not recall that we had discussed a different physical therapy approach with postural restoration to [facility].    She notes it is hard to get out and go to physical therapy.    However she is doing well in terms of her agoraphobia, continues on medical cannabis helping anxiety.  Her mental health is good.  Medical cannabis does not help with the pain.  She wonders what other options are available.    She has decreased the methadone to 80 mg daily.      Current Outpatient Medications:      acetaminophen (TYLENOL) 325 MG tablet, Take 650 mg by mouth every 4 hours as needed, Disp: , Rfl:      amLODIPine (NORVASC) 10 MG tablet, amlodipine 10 mg tablet  TAKE 1 TABLET BY MOUTH EVERY DAY, Disp: , Rfl:      ARIPiprazole (ABILIFY) 2 MG tablet, Take 1 tablet (2 mg) by mouth daily, Disp: 30 tablet, Rfl: 0     buPROPion (WELLBUTRIN XL) 300 MG 24 hr tablet, Take 1 tablet (300 mg) by mouth daily, Disp: 30 tablet, Rfl: 0     DULoxetine (CYMBALTA) 60 MG capsule, duloxetine 60 mg capsule,delayed release  " TAKE 2 CAPSULES BY MOUTH EVERY MORNING AND 1 CAPSULE BY MOUTH EVERY NIGHT AT BEDTIME, Disp: , Rfl:      ergocalciferol (ERGOCALCIFEROL) 1.25 MG (56015 UT) capsule, ergocalciferol (vitamin D2) 1,250 mcg (50,000 unit) capsule  TAKE ONE CAPSULE BY MOUTH EVERY MONDAY AND THURSDAY, Disp: , Rfl:      ferrous sulfate (FEROSUL) 325 (65 Fe) MG tablet, ferrous sulfate 325 mg (65 mg iron) tablet  TK 1 T PO QD. TAKE WITH A MEAL., Disp: , Rfl:      gabapentin (NEURONTIN) 600 MG tablet, Take 1 tablet (600 mg) by mouth 2 times daily, Disp: 56 tablet, Rfl: 0     lamoTRIgine (LAMICTAL) 100 MG tablet, TAKE 1 TABLET BY MOUTH EVERY MORNING AND 1/2 TABLET BY MOUTH EVERY NIGHT AT BEDTIME, Disp: , Rfl:      medical cannabis (Patient's own supply), 1 Dose by Other route Vaping 5 puffs inhaled 4-5 times a day, prn, Disp: , Rfl:      methadone (DOLOPHINE-INTENSOL) 10 MG/ML (HIGH CONC) solution, Take 150 mg by mouth, Disp: , Rfl:      Oxytocin POWD, 20 unit triturate one under tongue 3 times day 5 days, may increase to 2 under tongue, #30, Disp: 30 g, Rfl: 3     omeprazole (PRILOSEC) 20 MG capsule, Take 1 capsule (20 mg) by mouth every morning, Disp: 30 capsule, Rfl: 0     prochlorperazine (COMPAZINE) 5 MG tablet, Take by mouth as needed, Disp: , Rfl:      senna-docusate (SENOKOT-S/PERICOLACE) 8.6-50 MG tablet, Take 1 tablet by mouth 2 times daily as needed for constipation, Disp: 60 tablet, Rfl: 1     tiZANidine (ZANAFLEX) 4 MG tablet, , Disp: , Rfl:      vitamin B-12 (CYANOCOBALAMIN) 1000 MCG tablet, cyanocobalamin (vit B-12) 1,000 mcg tablet  TAKE 1 TABLET BY MOUTH EVERY DAY, Disp: , Rfl:     She is alert with a clear sensorium good eye contact.  Thought process logical affect is full range.  No anxiety.    No respiratory distress      Review of Systems         Objective    Vitals - Patient Reported  Pain Score: Severe Pain (7)  Pain Loc: Foot (left)        Physical Exam       Plan: Discussed oxytocin as another augmenting strategy for her  pain, which would not relate to any issues with her management through the methadone program.    Also reviewed again resources around frequency specific microcurrent.    Total time more than 25 minutes            Video-Visit Details    Type of service:  Video Visit    Video End Time:    Originating Location (pt. Location): home    Distant Location (provider location):  [facility]     Platform used for Video Visit: SuzanneTutum

## 2021-11-11 NOTE — LETTER
"    11/11/2021         RE: Jaime L Franklin Behrends  [address] [place] [address]        Dear Colleague,    Thank you for referring your patient, Jaime L Franklin Behrends, to the [facility]. Please see a copy of my visit note below.    Pt is scheduled for video visit today for left foot pain. Would like to discuss using something else besides Ketamine. Caused drowsiness.  If the video is dropped please text [phone]   AVS-Mail  Will anyone else be joining the visit today?No    Pain score:   Constant or intermittent  What does your pain feel like: Left foot-constant stabbing, sharp, Shooting at times.  Does the pain interfere with:  Work:N/A  Walking/distance: yes  Sleep: No  Daily activities: yes  Relationships/social life: yes  Mood: yes, brings me down sometimes  F= 7  UDT/CSA-5/7/21    =        Faxed rx Oxytocin to [facility].  Kaley Taveras LPN    Govind is a 56 year old who is being evaluated via a billable video visit.          How would you like to obtain your AVS?   If the video visit is dropped, the invitation should be resent by:   Will anyone else be joining your video visit?       Video Start Time:         Imelda Faust is a 56 year old who presents for the following health issues     Including lower extremity pain after multiple surgeries.    HPI   Reviews the ketamine seem to make her tired and also make her gag and throw up, did not help with pain.    Describes getting a letter regarding Dr. Zelaya and retired.  Names of other providers for the spinal cord stimulator.  She describes she is still trying to \"wrap my head around amputation\".  Reviewed the we had the discussion that a spinal cord stimulator trial would be an option to see if helps with pain and not have to consider amputation.  She had forgotten that discussion.    Similarly did not recall that we had discussed a different physical therapy approach with postural restoration to [facility].    She notes it is hard to get out and go to physical therapy.    However she is doing well in terms of her agoraphobia, continues on medical cannabis helping anxiety.  Her mental health is good.  Medical cannabis does not help with the pain.  She wonders what other options are available.    She has decreased the methadone to 80 mg daily.      Current Outpatient Medications:      acetaminophen (TYLENOL) 325 MG tablet, Take 650 mg by mouth every 4 hours as needed, Disp: , Rfl:      amLODIPine (NORVASC) 10 MG tablet, amlodipine 10 mg tablet  TAKE 1 TABLET BY MOUTH EVERY DAY, Disp: , Rfl:      ARIPiprazole (ABILIFY) 2 MG tablet, Take 1 tablet (2 mg) by mouth daily, Disp: 30 tablet, Rfl: 0     buPROPion (WELLBUTRIN XL) 300 MG 24 hr tablet, Take 1 tablet (300 mg) by mouth daily, Disp: 30 tablet, Rfl: 0     DULoxetine (CYMBALTA) 60 MG capsule, duloxetine 60 mg capsule,delayed release  TAKE 2 CAPSULES BY MOUTH EVERY MORNING AND 1 CAPSULE BY MOUTH EVERY NIGHT AT BEDTIME, Disp: , Rfl:      ergocalciferol (ERGOCALCIFEROL) 1.25 MG (99559 UT) capsule, ergocalciferol (vitamin D2) 1,250 mcg (50,000 unit) capsule  TAKE ONE CAPSULE BY MOUTH EVERY MONDAY AND THURSDAY, Disp: , Rfl:      ferrous sulfate (FEROSUL) 325 (65 Fe) MG tablet, ferrous sulfate 325 mg (65 mg iron) tablet  TK 1 T PO QD. TAKE WITH A MEAL., Disp: , Rfl:      gabapentin (NEURONTIN) 600 MG tablet, Take 1 tablet (600 mg) by mouth 2 times daily, Disp: 56 tablet, Rfl: 0     lamoTRIgine (LAMICTAL) 100 MG tablet, TAKE 1 TABLET BY MOUTH EVERY MORNING AND 1/2 TABLET BY MOUTH EVERY NIGHT AT BEDTIME, Disp: , Rfl:      medical cannabis (Patient's own supply), 1 Dose by Other route Vaping 5 puffs inhaled 4-5 times a day, prn, Disp: , Rfl:      methadone (DOLOPHINE-INTENSOL) 10 MG/ML (HIGH CONC) solution, Take 150 mg by mouth, Disp: , Rfl:      Oxytocin POWD, 20 unit triturate one under tongue 3 times day 5 days, may increase to 2 under tongue, #30, Disp: 30  g, Rfl: 3     omeprazole (PRILOSEC) 20 MG capsule, Take 1 capsule (20 mg) by mouth every morning, Disp: 30 capsule, Rfl: 0     prochlorperazine (COMPAZINE) 5 MG tablet, Take by mouth as needed, Disp: , Rfl:      senna-docusate (SENOKOT-S/PERICOLACE) 8.6-50 MG tablet, Take 1 tablet by mouth 2 times daily as needed for constipation, Disp: 60 tablet, Rfl: 1     tiZANidine (ZANAFLEX) 4 MG tablet, , Disp: , Rfl:      vitamin B-12 (CYANOCOBALAMIN) 1000 MCG tablet, cyanocobalamin (vit B-12) 1,000 mcg tablet  TAKE 1 TABLET BY MOUTH EVERY DAY, Disp: , Rfl:     She is alert with a clear sensorium good eye contact.  Thought process logical affect is full range.  No anxiety.    No respiratory distress      Review of Systems         Objective    Vitals - Patient Reported  Pain Score: Severe Pain (7)  Pain Loc: Foot (left)        Physical Exam       Plan: Discussed oxytocin as another augmenting strategy for her pain, which would not relate to any issues with her management through the methadone program.    Also reviewed again resources around frequency specific microcurrent.    Total time more than 25 minutes            Video-Visit Details    Type of service:  Video Visit    Video End Time:    Originating Location (pt. Location): home    Distant Location (provider location):  [facility]     Platform used for Video Visit: Ghislaine      Again, thank you for allowing me to participate in the care of your patient.        Sincerely,        MILADYS DE JESUS MD

## 2021-11-11 NOTE — PROGRESS NOTES
[facility].  Kaley Taveras LPN

## 2021-11-11 NOTE — PROGRESS NOTES
Pt is scheduled for video visit today for left foot pain. Would like to discuss using something else besides Ketamine. Caused drowsiness.  If the video is dropped please text [phone]   AVS-Mail  Will anyone else be joining the visit today?No    Pain score:   Constant or intermittent  What does your pain feel like: Left foot-constant stabbing, sharp, Shooting at times.  Does the pain interfere with:  Work:N/A  Walking/distance: yes  Sleep: No  Daily activities: yes  Relationships/social life: yes  Mood: yes, brings me down sometimes  F= 7  UDT/CSA-5/7/21

## 2021-11-11 NOTE — PATIENT INSTRUCTIONS
PLAN:  Referral to Dr. Johnson at [facility] to discuss spinal cord stimulator trial for your lower extremity pain.    Referral to [facility] for a different physical therapy approach to try to manage ankle pain.    Trial of oxytocin to augment the methadone to help with pain.  20 units lozenge under tongue 3 times a day for 5 days, may increase to 2 lozenges at a time.  Call with update after 1 week.  May use up to 80 units.    Discussed the use of frequency specific microcurrent as a modality to help with your lower extremity pain.  May contact these providers to see if take your insurance including [facility] [phone], [facility] [phone], or [facility] [phone].    Follow-up with Dr. Ramirez in 2 months

## 2021-11-23 DIAGNOSIS — G89.29 CHRONIC PAIN OF LEFT ANKLE: Primary | ICD-10-CM

## 2021-11-23 DIAGNOSIS — M25.572 CHRONIC PAIN OF LEFT ANKLE: Primary | ICD-10-CM

## 2021-11-23 NOTE — TELEPHONE ENCOUNTER
Received call from patient requesting refill(s) of Tizanidine     Last dispensed from pharmacy on 10/21/21    Patient's last office/virtual visit by prescribing provider on 11/11/21  Next office/virtual appointment scheduled for 1/4/22    E-prescribe to [facility] pharmacy  Pending Prescriptions:                       Disp   Refills    tiZANidine (ZANAFLEX) 4 MG tablet         112 ta*1            Sig: Take 4 tablets (16 mg) by mouth at bedtime as           needed, may repeat once for muscle spasms (after           4 hours)

## 2021-12-08 RX ORDER — DULOXETIN HYDROCHLORIDE 60 MG/1
CAPSULE, DELAYED RELEASE ORAL
Status: CANCELLED | OUTPATIENT
Start: 2021-12-08

## 2021-12-08 RX ORDER — DULOXETIN HYDROCHLORIDE 60 MG/1
CAPSULE, DELAYED RELEASE ORAL
Qty: 90 CAPSULE | Refills: 4 | Status: SHIPPED | OUTPATIENT
Start: 2021-12-08 | End: 2021-12-09

## 2021-12-08 NOTE — TELEPHONE ENCOUNTER
Refill request: duloxetine 60 mg    Pending Prescriptions:                       Disp   Refills    DULoxetine (CYMBALTA) 60 MG capsule       90 cap*4            Sig: duloxetine 60 mg capsule,delayed release  TAKE 2           CAPSULES BY MOUTH EVERY MORNING AND 1 CAPSULE BY           MOUTH EVERY NIGHT AT BEDTIME    Signed Prescriptions:                        Disp   Refills    tiZANidine (ZANAFLEX) 4 MG tablet          112 ta*1        Sig: Take 4 tablets (16 mg) by mouth at bedtime as needed,           may repeat once for muscle spasms (after 4 hours)  Authorizing Provider: MILADYS DE JESUS

## 2021-12-09 ENCOUNTER — TELEPHONE (OUTPATIENT)
Dept: PALLIATIVE MEDICINE | Facility: OTHER | Age: 56
End: 2021-12-09
Payer: MEDICAID

## 2021-12-09 DIAGNOSIS — M25.572 CHRONIC PAIN OF LEFT ANKLE: ICD-10-CM

## 2021-12-09 DIAGNOSIS — G89.29 CHRONIC PAIN OF LEFT ANKLE: ICD-10-CM

## 2021-12-09 RX ORDER — DULOXETIN HYDROCHLORIDE 30 MG/1
30 CAPSULE, DELAYED RELEASE ORAL 2 TIMES DAILY
Qty: 60 CAPSULE | Refills: 3 | Status: SHIPPED | OUTPATIENT
Start: 2021-12-09 | End: 2022-04-05

## 2021-12-09 RX ORDER — DULOXETIN HYDROCHLORIDE 60 MG/1
60 CAPSULE, DELAYED RELEASE ORAL 2 TIMES DAILY
Qty: 60 CAPSULE | Refills: 4 | Status: SHIPPED | OUTPATIENT
Start: 2021-12-09

## 2021-12-09 NOTE — TELEPHONE ENCOUNTER
Nayeli, patient's pharmacy calls and states that the Duloxetine either needs a PA or if the provider is ok with it she can change the Duloxetine to 60mg - 2 tabs in the AM and another Rx would be for Duloxetine 30mg - 2 tabs in the evening instead of how it is written now.  This would go through the insurance without a PA.    How Rx is written now      Dr. Ramirez, please advise.

## 2021-12-14 ENCOUNTER — TELEPHONE (OUTPATIENT)
Dept: PALLIATIVE MEDICINE | Facility: OTHER | Age: 56
End: 2021-12-14
Payer: MEDICAID

## 2021-12-22 ENCOUNTER — TELEPHONE (OUTPATIENT)
Dept: PALLIATIVE MEDICINE | Facility: OTHER | Age: 56
End: 2021-12-22
Payer: MEDICAID

## 2021-12-22 DIAGNOSIS — M25.572 CHRONIC PAIN OF LEFT ANKLE: ICD-10-CM

## 2021-12-22 DIAGNOSIS — G89.29 CHRONIC PAIN OF LEFT ANKLE: ICD-10-CM

## 2021-12-22 RX ORDER — GABAPENTIN 600 MG/1
600 TABLET ORAL 2 TIMES DAILY
Qty: 56 TABLET | Refills: 1 | Status: SHIPPED | OUTPATIENT
Start: 2021-12-22 | End: 2022-02-23

## 2021-12-22 NOTE — TELEPHONE ENCOUNTER
Received call from patient requesting refill(s) for gabapentin (NEURONTIN) 600 MG tablet    Last refilled on 11/23/21    Patient stated she already requested this and is still waiting on it and called to check on it. I don't see a request.     Patient also did not specify a pharmacy.     Pt last seen on 11/11/21    Next appt scheduled for 01/04/22    Will route to nursing.     Azalea Soliman [facility] [place]

## 2022-01-04 ENCOUNTER — VIRTUAL VISIT (OUTPATIENT)
Dept: PALLIATIVE MEDICINE | Facility: OTHER | Age: 57
End: 2022-01-04
Payer: MEDICAID

## 2022-01-04 DIAGNOSIS — G89.29 CHRONIC PAIN OF LEFT ANKLE: Primary | ICD-10-CM

## 2022-01-04 DIAGNOSIS — M25.572 CHRONIC PAIN OF LEFT ANKLE: Primary | ICD-10-CM

## 2022-01-04 PROCEDURE — 99212 OFFICE O/P EST SF 10 MIN: CPT | Mod: 95 | Performed by: ANESTHESIOLOGY

## 2022-01-04 PROCEDURE — G0463 HOSPITAL OUTPT CLINIC VISIT: HCPCS | Mod: PN,RTG | Performed by: ANESTHESIOLOGY

## 2022-01-04 ASSESSMENT — PAIN SCALES - GENERAL: PAINLEVEL: SEVERE PAIN (7)

## 2022-01-04 NOTE — PROGRESS NOTES
Completed online renewal for the Medical Cannabis program. Left a message for patient to return call, please give above message.  Kaley Taveras LPN

## 2022-01-04 NOTE — PROGRESS NOTES
Govind is a 56 year old who is being evaluated via a billable video visit.      How would you like to obtain your AVS?   If the video visit is dropped, the invitation should be resent by:   Will anyone else be joining your video visit?   Video Start Time:         Subjective   Govind is a 56 year old who presents for the following health issues   Left ankle pain, history of left ankle surgeries, fusion x2      HPI     Kain reviews agoraphobia starting to be a problem again over the last several weeks.  It can be hard to go out.  She wonders if relates in part to Covid concerns and all the events in the world.  She is able to get up to the methadone clinic weekly.  Acknowledges may be concerned about the new variant.  She plans to schedule her booster and may feel more comfortable.    Since last seen we have discussed a spinal cord stimulator.  She describes she has been busy caring for her grandson several days a week and did get a call.  However she continues to want to look into see a surgeon that might try to fix her foot.  She is concerned about loose hardware.  Reviews work with [facility] and [facility] orthopedic, the doctor that was willing to do surgery for her ankle left the group.    Similarly impact physical therapy she did not pursue has a therapist at work in the past.    She did try oxytocin to augment the methadone, made her tired and did not help.    She continues with the medical cannabis program using high THC vaporizer which seems to help and is due for refill      Current Outpatient Medications:      acetaminophen (TYLENOL) 325 MG tablet, Take 650 mg by mouth every 4 hours as needed, Disp: , Rfl:      amLODIPine (NORVASC) 10 MG tablet, amlodipine 10 mg tablet  TAKE 1 TABLET BY MOUTH EVERY DAY, Disp: , Rfl:      ARIPiprazole (ABILIFY) 2 MG tablet, Take 1 tablet (2 mg) by mouth daily, Disp: 30 tablet, Rfl: 0     buPROPion (WELLBUTRIN XL) 300 MG 24 hr tablet, Take 1 tablet (300 mg) by mouth  daily, Disp: 30 tablet, Rfl: 0     DULoxetine (CYMBALTA) 30 MG capsule, Take 1 capsule (30 mg) by mouth 2 times daily, Disp: 60 capsule, Rfl: 3     DULoxetine (CYMBALTA) 60 MG capsule, Take 1 capsule (60 mg) by mouth 2 times daily Taken with duloxetine 30 mg two daily, Disp: 60 capsule, Rfl: 4     ergocalciferol (ERGOCALCIFEROL) 1.25 MG (87868 UT) capsule, ergocalciferol (vitamin D2) 1,250 mcg (50,000 unit) capsule  TAKE ONE CAPSULE BY MOUTH EVERY MONDAY AND THURSDAY, Disp: , Rfl:      ferrous sulfate (FEROSUL) 325 (65 Fe) MG tablet, ferrous sulfate 325 mg (65 mg iron) tablet  TK 1 T PO QD. TAKE WITH A MEAL., Disp: , Rfl:      gabapentin (NEURONTIN) 600 MG tablet, Take 1 tablet (600 mg) by mouth 2 times daily, Disp: 56 tablet, Rfl: 1     lamoTRIgine (LAMICTAL) 100 MG tablet, TAKE 1 TABLET BY MOUTH EVERY MORNING AND 1/2 TABLET BY MOUTH EVERY NIGHT AT BEDTIME, Disp: , Rfl:      medical cannabis (Patient's own supply), 1 Dose by Other route Vaping 5 puffs inhaled 4-5 times a day, prn, Disp: , Rfl:      methadone (DOLOPHINE-INTENSOL) 10 MG/ML (HIGH CONC) solution, Take 150 mg by mouth, Disp: , Rfl:      omeprazole (PRILOSEC) 20 MG capsule, Take 1 capsule (20 mg) by mouth every morning, Disp: 30 capsule, Rfl: 0     prochlorperazine (COMPAZINE) 5 MG tablet, Take by mouth as needed, Disp: , Rfl:      senna-docusate (SENOKOT-S/PERICOLACE) 8.6-50 MG tablet, Take 1 tablet by mouth 2 times daily as needed for constipation, Disp: 60 tablet, Rfl: 1     tiZANidine (ZANAFLEX) 4 MG tablet, Take 4 tablets (16 mg) by mouth at bedtime as needed, may repeat once for muscle spasms (after 4 hours), Disp: 112 tablet, Rfl: 1     vitamin B-12 (CYANOCOBALAMIN) 1000 MCG tablet, cyanocobalamin (vit B-12) 1,000 mcg tablet  TAKE 1 TABLET BY MOUTH EVERY DAY, Disp: , Rfl:   Video alert, clear sensorium.  Thought process logical.  Affect is fairly full range.    Review of Systems         Objective    Vitals - Patient Reported  Pain Score: Severe  Pain (7)  Pain Loc:  (left foot ankle)        Physical Exam       Assessment: Agoraphobia, reviewed balance of appropriate concerns with present Covid situation, versus cognitive benefits of getting the booster and she will look into this.    Discussed referral to our orthopedic group with the [place] to see if a consultation there will be helpful for her considering options to proceed.    Total time more than 10 minutes            Video-Visit Details    Type of service:  Video Visit    Video End Time:    Originating Location (pt. Location):     Distant Location (provider location):  [facility]     Platform used for Video Visit:

## 2022-01-04 NOTE — LETTER
1/4/2022         RE: Jaime L Franklin Behrends  [address]  [address]  [place] [address]        Dear Colleague,    Thank you for referring your patient, Jaime L Franklin Behrends, to the [facility]. Please see a copy of my visit note below.    Patient would like to discuss pain management and the current medication not being very effective.  Oxytocin: this did reduce pain  Ketamine: this did not reduce pain    Patient would like a link to be sent to phone number: [phone]  Please mail AVS to patient.        Govind is a 56 year old who is being evaluated via a billable video visit.      How would you like to obtain your AVS?   If the video visit is dropped, the invitation should be resent by:   Will anyone else be joining your video visit?   Video Start Time:         Subjective   Govind is a 56 year old who presents for the following health issues   Left ankle pain, history of left ankle surgeries, fusion x2      HPI     Kain reviews agoraphobia starting to be a problem again over the last several weeks.  It can be hard to go out.  She wonders if relates in part to Covid concerns and all the events in the world.  She is able to get up to the methadone clinic weekly.  Acknowledges may be concerned about the new variant.  She plans to schedule her booster and may feel more comfortable.    Since last seen we have discussed a spinal cord stimulator.  She describes she has been busy caring for her grandson several days a week and did get a call.  However she continues to want to look into see a surgeon that might try to fix her foot.  She is concerned about loose hardware.  Reviews work with [facility] and [facility] orthopedic, the doctor that was willing to do surgery for her ankle left the group.    Similarly impact physical therapy she did not pursue has a therapist at work in the past.    She did try oxytocin to augment the methadone, made her tired and did not help.    She  continues with the medical cannabis program using high THC vaporizer which seems to help and is due for refill      Current Outpatient Medications:      acetaminophen (TYLENOL) 325 MG tablet, Take 650 mg by mouth every 4 hours as needed, Disp: , Rfl:      amLODIPine (NORVASC) 10 MG tablet, amlodipine 10 mg tablet  TAKE 1 TABLET BY MOUTH EVERY DAY, Disp: , Rfl:      ARIPiprazole (ABILIFY) 2 MG tablet, Take 1 tablet (2 mg) by mouth daily, Disp: 30 tablet, Rfl: 0     buPROPion (WELLBUTRIN XL) 300 MG 24 hr tablet, Take 1 tablet (300 mg) by mouth daily, Disp: 30 tablet, Rfl: 0     DULoxetine (CYMBALTA) 30 MG capsule, Take 1 capsule (30 mg) by mouth 2 times daily, Disp: 60 capsule, Rfl: 3     DULoxetine (CYMBALTA) 60 MG capsule, Take 1 capsule (60 mg) by mouth 2 times daily Taken with duloxetine 30 mg two daily, Disp: 60 capsule, Rfl: 4     ergocalciferol (ERGOCALCIFEROL) 1.25 MG (42752 UT) capsule, ergocalciferol (vitamin D2) 1,250 mcg (50,000 unit) capsule  TAKE ONE CAPSULE BY MOUTH EVERY MONDAY AND THURSDAY, Disp: , Rfl:      ferrous sulfate (FEROSUL) 325 (65 Fe) MG tablet, ferrous sulfate 325 mg (65 mg iron) tablet  TK 1 T PO QD. TAKE WITH A MEAL., Disp: , Rfl:      gabapentin (NEURONTIN) 600 MG tablet, Take 1 tablet (600 mg) by mouth 2 times daily, Disp: 56 tablet, Rfl: 1     lamoTRIgine (LAMICTAL) 100 MG tablet, TAKE 1 TABLET BY MOUTH EVERY MORNING AND 1/2 TABLET BY MOUTH EVERY NIGHT AT BEDTIME, Disp: , Rfl:      medical cannabis (Patient's own supply), 1 Dose by Other route Vaping 5 puffs inhaled 4-5 times a day, prn, Disp: , Rfl:      methadone (DOLOPHINE-INTENSOL) 10 MG/ML (HIGH CONC) solution, Take 150 mg by mouth, Disp: , Rfl:      omeprazole (PRILOSEC) 20 MG capsule, Take 1 capsule (20 mg) by mouth every morning, Disp: 30 capsule, Rfl: 0     prochlorperazine (COMPAZINE) 5 MG tablet, Take by mouth as needed, Disp: , Rfl:      senna-docusate (SENOKOT-S/PERICOLACE) 8.6-50 MG tablet, Take 1 tablet by mouth 2 times  daily as needed for constipation, Disp: 60 tablet, Rfl: 1     tiZANidine (ZANAFLEX) 4 MG tablet, Take 4 tablets (16 mg) by mouth at bedtime as needed, may repeat once for muscle spasms (after 4 hours), Disp: 112 tablet, Rfl: 1     vitamin B-12 (CYANOCOBALAMIN) 1000 MCG tablet, cyanocobalamin (vit B-12) 1,000 mcg tablet  TAKE 1 TABLET BY MOUTH EVERY DAY, Disp: , Rfl:   Video alert, clear sensorium.  Thought process logical.  Affect is fairly full range.    Review of Systems         Objective    Vitals - Patient Reported  Pain Score: Severe Pain (7)  Pain Loc:  (left foot ankle)        Physical Exam       Assessment: Agoraphobia, reviewed balance of appropriate concerns with present Covid situation, versus cognitive benefits of getting the booster and she will look into this.    Discussed referral to our orthopedic group with the [facility] to see if a consultation there will be helpful for her considering options to proceed.    Total time more than 10 minutes            Video-Visit Details    Type of service:  Video Visit    Video End Time:    Originating Location (pt. Location):     Distant Location (provider location):  [facility]     Platform used for Video Visit:     Completed online renewal for the Medical Cannabis program. Left a message for patient to return call, please give above message.  Kaley Taveras LPN        01/04/22 0976   PEG: A Thee-Item Scale Assessing Pain Intensity and Interference        0 = No pain / No interference    10 = Pain as bad as you can imagine / Completely interferes   What number best describes your pain on average in the past week? 7   What number best describes how, during the past week, pain has interfered with your enjoyment of life? 8   What number best describes how, during the past week, pain has interfered with your general activity? 8   PEG Total Score 7.67       Again, thank you for allowing me to participate in the care of your patient.         Sincerely,        MILADYS DE JESUS MD

## 2022-01-04 NOTE — PROGRESS NOTES
Patient would like to discuss pain management and the current medication not being very effective.  Oxytocin: this did reduce pain  Ketamine: this did not reduce pain    Patient would like a link to be sent to phone number: [phone]  Please mail AVS to patient.

## 2022-01-04 NOTE — PATIENT INSTRUCTIONS
[facility] Number:  [phone]    Call with any questions about your care and for scheduling assistance.     Calls are returned Monday through Friday between 8 AM and 4:30 PM. We usually get back to you within 2 business days depending on the issue/request.    If we are prescribing your medications:    For opioid medication refills, call the clinic or send a [employer] message 7 days in advance.  Please include:    Name of requested medication    Name of the pharmacy.    For non-opioid medications, call your pharmacy directly to request a refill. Please allow 3-4 days to be processed.     Per [place] State Law:    All controlled substance prescriptions must be filled within 30 days of being written.      For those controlled substances allowing refills, pickup must occur within 30 days of last fill.      We believe regular attendance is key to your success in our program!      Any time you are unable to keep your appointment we ask that you call us at least 24 hours in advance to cancel.This will allow us to offer the appointment time to another patient.     Multiple missed appointments may lead to dismissal from the clinic.      PLAN:  You will get the Covid booster through your pharmacy.    You will be reenrolled in the [facility].    Referral to the [facility] orthopedics group for another opinion about surgery for your left ankle    Follow-up with Dr. Ramirez in 4 months

## 2022-01-04 NOTE — PROGRESS NOTES
01/04/22 0924   PEG: A Thee-Item Scale Assessing Pain Intensity and Interference        0 = No pain / No interference    10 = Pain as bad as you can imagine / Completely interferes   What number best describes your pain on average in the past week? 7   What number best describes how, during the past week, pain has interfered with your enjoyment of life? 8   What number best describes how, during the past week, pain has interfered with your general activity? 8   PEG Total Score 7.67

## 2022-01-07 DIAGNOSIS — F11.93 OPIATE WITHDRAWAL (H): Primary | ICD-10-CM

## 2022-01-07 DIAGNOSIS — M25.572 CHRONIC PAIN OF LEFT ANKLE: ICD-10-CM

## 2022-01-07 DIAGNOSIS — G89.4 CHRONIC PAIN SYNDROME: ICD-10-CM

## 2022-01-07 DIAGNOSIS — G89.29 CHRONIC PAIN OF LEFT ANKLE: ICD-10-CM

## 2022-01-07 RX ORDER — CELECOXIB 100 MG/1
100 CAPSULE ORAL 2 TIMES DAILY
Qty: 60 CAPSULE | Refills: 3 | Status: SHIPPED | OUTPATIENT
Start: 2022-01-07 | End: 2024-08-02

## 2022-01-10 RX ORDER — AMOXICILLIN 250 MG
1 CAPSULE ORAL 2 TIMES DAILY PRN
Qty: 60 TABLET | Refills: 1 | Status: SHIPPED | OUTPATIENT
Start: 2022-01-10 | End: 2022-07-28

## 2022-01-10 NOTE — TELEPHONE ENCOUNTER
Received fax request from   [facility]  Last refilled on 10/26/21    Pt last seen on 1/4/22  Next appt scheduled for 4/5/22    Pending Prescriptions:                       Disp   Refills    senna-docusate (SENOKOT-S/PERICOLACE) 8.6*60 tab*1            Sig: Take 1 tablet by mouth 2 times daily as needed           for constipation    Nayeli

## 2022-01-12 VITALS — BODY MASS INDEX: 31.53 KG/M2 | HEIGHT: 66 IN | WEIGHT: 196.21 LBS

## 2022-01-26 DIAGNOSIS — M25.572 CHRONIC PAIN OF LEFT ANKLE: ICD-10-CM

## 2022-01-26 DIAGNOSIS — G89.29 CHRONIC PAIN OF LEFT ANKLE: ICD-10-CM

## 2022-01-26 NOTE — TELEPHONE ENCOUNTER
Received fax request from   Tizanidine 4 mg  Last refilled on 12/22/21    Pt last seen on 1/4/22  Next appt scheduled for 4/5/22    Pending Prescriptions:                       Disp   Refills    tiZANidine (ZANAFLEX) 4 MG tablet         112 ta*1            Sig: Take 4 tablets (16 mg) by mouth at bedtime as           needed, may repeat once for muscle spasms (after           4 hours)    Nayeli

## 2022-02-01 DIAGNOSIS — G89.4 CHRONIC PAIN SYNDROME: Primary | ICD-10-CM

## 2022-02-01 RX ORDER — LAMOTRIGINE 100 MG/1
TABLET ORAL
Qty: 45 TABLET | Refills: 4 | Status: SHIPPED | OUTPATIENT
Start: 2022-02-01

## 2022-02-01 NOTE — TELEPHONE ENCOUNTER
Receive request requesting refill(s) for lamoTRIgine (LAMICTAL) 100 MG tablet    Pharmacy [facility] #[id number] - [place], [place] - [address] AT [address]    Last refilled on 01/07/2022    Pt last seen on 01/04/2022  Next appt scheduled for 04/05/2022    Will facilitate refill.    Jennifer Miller MA  [facility] [place]

## 2022-02-23 DIAGNOSIS — M25.572 CHRONIC PAIN OF LEFT ANKLE: ICD-10-CM

## 2022-02-23 DIAGNOSIS — G89.29 CHRONIC PAIN OF LEFT ANKLE: ICD-10-CM

## 2022-02-23 RX ORDER — GABAPENTIN 600 MG/1
600 TABLET ORAL 2 TIMES DAILY
Qty: 56 TABLET | Refills: 1 | Status: SHIPPED | OUTPATIENT
Start: 2022-02-23 | End: 2022-04-18

## 2022-02-23 NOTE — TELEPHONE ENCOUNTER
Received call from pt requesting refill(s) for gabapentin (NEURONTIN) 600 MG tablet    Last refilled on 1/25/22    Pt last seen on 1/4/22  Next appt scheduled for 4/5/22    Will facilitate refill.    Pt left message saying she called earlier and left a message for tizanidine, but it should have been gabapentin.

## 2022-02-23 NOTE — TELEPHONE ENCOUNTER
Medication refill information reviewed.     Prescriptions prepped for review.   Pending Prescriptions:                       Disp   Refills    gabapentin (NEURONTIN) 600 MG tablet      56 tab*1            Sig: Take 1 tablet (600 mg) by mouth 2 times daily      Will route to provider.

## 2022-02-23 NOTE — TELEPHONE ENCOUNTER
Refill request: tizanidine  Last apt with BE: 1/4/22  Next apt with BE: 4/5/22    Last dispense date: 2/19/22-14 day supply if taking max dosing    Pending Prescriptions:                       Disp   Refills    tiZANidine (ZANAFLEX) 4 MG tablet         112 ta*1            Sig: Take 4 tablets (16 mg) by mouth at bedtime as           needed, may repeat once for muscle spasms (after           4 hours)    marcial

## 2022-04-04 ENCOUNTER — TELEPHONE (OUTPATIENT)
Dept: PALLIATIVE MEDICINE | Facility: OTHER | Age: 57
End: 2022-04-04

## 2022-04-04 DIAGNOSIS — G89.29 CHRONIC PAIN OF LEFT ANKLE: ICD-10-CM

## 2022-04-04 DIAGNOSIS — M25.572 CHRONIC PAIN OF LEFT ANKLE: ICD-10-CM

## 2022-04-04 NOTE — TELEPHONE ENCOUNTER
Patient requesting refill DULoxetine (CYMBALTA) 30 MG capsule  Pharmacy [facility] #[id number] - [place], [place] - [address] AT [facility] [address] [facility] Patient Facilitator

## 2022-04-05 ENCOUNTER — VIRTUAL VISIT (OUTPATIENT)
Dept: PALLIATIVE MEDICINE | Facility: OTHER | Age: 57
End: 2022-04-05
Payer: MEDICAID

## 2022-04-05 DIAGNOSIS — G89.29 CHRONIC PAIN OF LEFT ANKLE: Primary | ICD-10-CM

## 2022-04-05 DIAGNOSIS — M25.572 CHRONIC PAIN OF LEFT ANKLE: Primary | ICD-10-CM

## 2022-04-05 RX ORDER — DULOXETIN HYDROCHLORIDE 30 MG/1
30 CAPSULE, DELAYED RELEASE ORAL 2 TIMES DAILY
Qty: 60 CAPSULE | Refills: 3 | Status: SHIPPED | OUTPATIENT
Start: 2022-04-05 | End: 2022-09-07

## 2022-04-05 ASSESSMENT — PAIN SCALES - GENERAL: PAINLEVEL: MODERATE PAIN (5)

## 2022-04-05 NOTE — PROGRESS NOTES
Govind has informed me that her phone is broken and requested a telephone visit. I informed her that I would have to talk to DR. Ramirez. After doing so. DR. Ramirez confirmed that telephone visits can not be done and that Kain would have to reschedule in clinic and call the nurse line for any refills.     Jennifer Miller MA  [facility] [place]

## 2022-04-05 NOTE — PROGRESS NOTES
Patient presents to the clinic today for a follow up with MILADYS DE JESUS MD  regarding Pain Management.     Govind is a 56 year old who is being evaluated via a billable video visit.      How would you like to obtain your AVS? Mail a copy  If the video visit is dropped, the invitation should be resent by: Text to cell phone: [phone]  Will anyone else be joining your video visit? No      Video Start Time:   Video-Visit Details    Type of service:  Video Visit    Video End Time:    Originating Location (pt. Location):     Distant Location (provider location):  [facility]     Platform used for Video Visit: Doximity         Is Pt currently in [place]? Yes    NOTE:  If Pt is not in [place], Appointment needs to be canceled and rescheduled         PEG Score 1/4/2022 4/5/2022   PEG Total Score 7.67 9.33        UDT/CSA = 5/7/2021       Jennifer Miller MA  [facility]

## 2022-04-05 NOTE — TELEPHONE ENCOUNTER
Patient was scheduled for video visit today.  Rooming staff were informed that her telephone does not have the video ability.  She was informed we cannot do telephone visits will reschedule for an office visit

## 2022-04-05 NOTE — LETTER
4/5/2022         RE: Jaime L Franklin Behrends  [address]  [address]  [place] [address]        Dear Colleague,    Thank you for referring your patient, Jaime L Franklin Behrends, to the [facility]. Please see a copy of my visit note below.    Patient presents to the clinic today for a follow up with MILADYS DE JESUS MD  regarding Pain Management.     Govind is a 56 year old who is being evaluated via a billable video visit.      How would you like to obtain your AVS? Mail a copy  If the video visit is dropped, the invitation should be resent by: Text to cell phone: [phone]  Will anyone else be joining your video visit? No      Video Start Time:   Video-Visit Details    Type of service:  Video Visit    Video End Time:    Originating Location (pt. Location):     Distant Location (provider location):  [facility] [place]     Platform used for Video Visit: Doximity         Is Pt currently in [place]? Yes    NOTE:  If Pt is not in [place], Appointment needs to be canceled and rescheduled         PEG Score 1/4/2022 4/5/2022   PEG Total Score 7.67 9.33        UDT/CSA = 5/7/2021       Jenniefr Miller MA  [facility]     Govind has informed me that her phone is broken and requested a telephone visit. I informed her that I would have to talk to DR. De Jesus. After doing so. DR. De Jesus confirmed that telephone visits can not be done and that Kain would have to reschedule in clinic and call the nurse line for any refills.     Jennifer Miller MA  [facility]      Again, thank you for allowing me to participate in the care of your patient.        Sincerely,        MILADYS DE JESUS MD

## 2022-04-18 DIAGNOSIS — M25.572 CHRONIC PAIN OF LEFT ANKLE: ICD-10-CM

## 2022-04-18 DIAGNOSIS — G89.29 CHRONIC PAIN OF LEFT ANKLE: ICD-10-CM

## 2022-04-18 RX ORDER — GABAPENTIN 600 MG/1
600 TABLET ORAL 2 TIMES DAILY
Qty: 56 TABLET | Refills: 1 | Status: SHIPPED | OUTPATIENT
Start: 2022-04-18 | End: 2024-08-02

## 2022-04-18 NOTE — TELEPHONE ENCOUNTER
Patient requesting refill gabapentin (NEURONTIN) 600 MG tablet    Pharmacy [facility] #[id number] - [place], [place] - [address] [place]  [facility] Patient Facilitator

## 2022-04-18 NOTE — TELEPHONE ENCOUNTER
Received fax request from   Gabapentin 600 mg  Last refilled on 3/21/22-28 days    Pt last seen on 4/5/22  Next appt scheduled for needs to schedule another apt    Pending Prescriptions:                       Disp   Refills    gabapentin (NEURONTIN) 600 MG tablet      56 tab*1            Sig: Take 1 tablet (600 mg) by mouth 2 times daily    Nayeli

## 2022-05-13 DIAGNOSIS — G89.4 CHRONIC PAIN SYNDROME: Primary | ICD-10-CM

## 2022-05-13 NOTE — TELEPHONE ENCOUNTER
Patient requesting refill tiZANidine (ZANAFLEX) 4 MG tablet    Pharmacy [facility] #[id number] - [place], [place] - [address] AT [address]    Fax from pharmacy    [facility]

## 2022-05-13 NOTE — TELEPHONE ENCOUNTER
Received fax request from [facility] #[id number] - [place], [place] - [address] AT [facility] [address] pharmacy requesting refill(s) for tiZANidine (ZANAFLEX) 4 MG tablet    Last refilled on 04/18/2022    Pt last seen on 01/04/2022  Next appt scheduled for NA    Will facilitate refill.    Jennifer Perales MA  [facility] Pain Management [place]

## 2022-07-28 DIAGNOSIS — G89.29 CHRONIC PAIN OF LEFT ANKLE: ICD-10-CM

## 2022-07-28 DIAGNOSIS — M25.572 CHRONIC PAIN OF LEFT ANKLE: ICD-10-CM

## 2022-07-28 RX ORDER — AMOXICILLIN 250 MG
1 CAPSULE ORAL 2 TIMES DAILY PRN
Qty: 60 TABLET | Refills: 1 | Status: SHIPPED | OUTPATIENT
Start: 2022-07-28

## 2022-07-28 NOTE — TELEPHONE ENCOUNTER
Received fax request from [facility] #[id number] - [place], [place] - [address] AT [place] [address]  pharmacy requesting refill(s) for senna-docusate (SENOKOT-S/PERICOLACE) 8.6-50 MG tablet    Last refilled on 04/04/2022    Pt last seen on 04/05/2022  Next appt scheduled for None    Will facilitate refill.    Jennifer Perales MA  [facility] Management [place]

## 2022-09-07 DIAGNOSIS — M25.572 CHRONIC PAIN OF LEFT ANKLE: ICD-10-CM

## 2022-09-07 DIAGNOSIS — G89.29 CHRONIC PAIN OF LEFT ANKLE: ICD-10-CM

## 2022-09-07 RX ORDER — DULOXETIN HYDROCHLORIDE 30 MG/1
30 CAPSULE, DELAYED RELEASE ORAL 2 TIMES DAILY
Qty: 60 CAPSULE | Refills: 3 | Status: SHIPPED | OUTPATIENT
Start: 2022-09-07

## 2022-09-07 NOTE — TELEPHONE ENCOUNTER
Received fax request from [facility] #[id number] - [place], [place] - [address] AT [place] [address], pharmacy requesting refill(s) for DULoxetine (CYMBALTA) 30 MG capsule    Last refilled on 07.27.2022    Pt last seen on 01.04.2022  Next appt scheduled for None    Will facilitate refill.    Jennifer Perales MA  [facility] Management [place]

## 2022-09-07 NOTE — TELEPHONE ENCOUNTER
Received fax from pharmacy requesting refill(s) for     DULoxetine (CYMBALTA) 30 MG capsule    Date last filled 07.27.2022    Last Appt Date:01.04.2022    Next Appt scheduled: NS    Pharmacy:      [facility] #[id number] [place] [address] AT [place] [address],    Will route to MA POOL for processing    Sherin Simmons  [facility] Visit Facilitator

## 2022-10-24 ENCOUNTER — TELEPHONE (OUTPATIENT)
Dept: PALLIATIVE MEDICINE | Facility: OTHER | Age: 57
End: 2022-10-24

## 2022-10-25 NOTE — TELEPHONE ENCOUNTER
Patient is no longer seeing Dr. Ramirez, PCP has taken over meds and this should have gone to them.  Patient will call pharmacy to have it changed.

## 2023-07-21 NOTE — TELEPHONE ENCOUNTER
Left another message for pt to schedule labs   Patient calls report having knee surgery coming up.  She is on the buprenorphine.  She is aware that she was changed from other opioids because of the methadone in her system but asks if there is ever chance to go back to that.    I reviewed that given the situation we continue on buprenorphine products.  Discussed that anesthesiologist are very familiar with having people on buprenorphine through surgery.  Discussed it would be optimum to taper down the dose before surgery if able.  Otherwise she would continue on this with options to use buprenorphine at higher doses or IV through the surgery, or other opioids afterwards.  I reassured her that anesthesiologist are familiar with  the situation and they are welcome to call if they have questions

## 2023-08-28 NOTE — TELEPHONE ENCOUNTER
Chief Complaint  Follow-up (1 month/)    Subjective          Maricarmen Ferris presents to [facility] [place] for one month follow up. She has lost 11 pounds since last month.       Hypertension  This is a chronic problem. The current episode started more than 1 year ago. The problem is unchanged. The problem is controlled. Pertinent negatives include no chest pain. Risk factors for coronary artery disease include obesity and dyslipidemia. Past treatments include ACE inhibitors. Current antihypertension treatment includes ACE inhibitors. The current treatment provides mild improvement.   Obesity  This is a recurrent problem. The current episode started more than 1 year ago. The problem occurs constantly. The problem has been gradually improving. Pertinent negatives include no chest pain, congestion, coughing or sore throat. The symptoms are aggravated by eating and drinking. She has tried walking, eating and drinking (bariatric surgery, diet, GLP1) for the symptoms. The treatment provided mild relief.   Outpatient Medications Prior to Visit   Medication Sig Dispense Refill    albuterol sulfate  (90 Base) MCG/ACT inhaler Inhale 2 puffs Every 4 (Four) Hours As Needed for Wheezing or Shortness of Air. 6.7 g 0    buPROPion SR (WELLBUTRIN SR) 150 MG 12 hr tablet Take 1 tablet by mouth 2 (Two) Times a Day. 60 tablet 11    calcium carbonate (TUMS) 500 MG chewable tablet Chew 1 tablet Daily.      colestipol (Colestid) 1 g tablet Take 1 tablet by mouth 2 (Two) Times a Day. 60 tablet 2    cyanocobalamin (VITAMIN B-12) 500 MCG tablet Take 1 tablet by mouth Daily. 30 tablet 11    cyclobenzaprine (FLEXERIL) 10 MG tablet TAKE 1 TABLET BY MOUTH THREE TIMES A DAY AS NEEDED FOR MUSCLE SPASM 30 tablet 0    diazePAM (VALIUM) 5 MG tablet Take 1 tablet twice a day by oral route.      Diclofenac Sodium (VOLTAREN) 1 % gel gel Apply  topically to the appropriate area as directed 2  PA Initiation 12/14/2021    Medication: Duloxetine 60 mg is non formulary  Insurance Company:  [place]  Pharmacy Filling the Rx:  [facility]  Filling Pharmacy Phone:  [phone]  Filling Pharmacy Fax: [phone]   Start Date:  12/9/2021     (Two) Times a Day. 50 g 6    fluticasone (FLONASE) 50 MCG/ACT nasal spray INSTILL 2 SPRAYS INTO THE NOSTRIL AS DIRECTED BY PROVIDER DAILY. 16 mL 3    furosemide (LASIX) 40 MG tablet TAKE 1 TABLET BY MOUTH AS NEEDED (SWELLING). 30 tablet 11    gabapentin (NEURONTIN) 600 MG tablet Take 1 tablet by mouth 2 (Two) Times a Day.      ibuprofen (ADVIL,MOTRIN) 800 MG tablet TAKE 1 TABLET BY MOUTH EVERY 8 (EIGHT) HOURS AS NEEDED FOR MILD PAIN OR FEVER.      lisinopril (PRINIVIL,ZESTRIL) 2.5 MG tablet Take 1 tablet by mouth Daily. 90 tablet 3    loperamide (IMODIUM) 2 MG capsule Take 1 capsule by mouth 4 (Four) Times a Day As Needed for Diarrhea. 24 capsule 3    loratadine (CLARITIN) 10 MG tablet       multivitamin (THERAGRAN) tablet tablet Take 1 tablet by mouth Daily.      nitroglycerin (NITROSTAT) 0.4 MG SL tablet Place 1 tablet under the tongue Every 5 (Five) Minutes As Needed for Chest Pain. Take no more than 3 doses in 15 minutes. 30 tablet 12    nystatin (MYCOSTATIN) 177668 UNIT/GM powder Apply  topically to the appropriate area as directed 3 (Three) Times a Day. 30 g 11    omeprazole (priLOSEC) 40 MG capsule Take 1 capsule by mouth 2 (Two) Times a Day. 60 capsule 6    ondansetron ODT (ZOFRAN-ODT) 4 MG disintegrating tablet PLACE 1 TABLET ON THE TONGUE EVERY 6 (SIX) HOURS AS NEEDED FOR NAUSEA OR VOMITING. 15 tablet 3    promethazine (PHENERGAN) 25 MG tablet Take 1 tablet by mouth Every 8 (Eight) Hours As Needed for Nausea or Vomiting. 30 tablet 3    promethazine-dextromethorphan (PROMETHAZINE-DM) 6.25-15 MG/5ML syrup Take 5 mL by mouth 4 (Four) Times a Day As Needed for Cough. 118 mL 0    sertraline (ZOLOFT) 100 MG tablet Take 1 tablet by mouth Every Morning. 30 tablet 11    traZODone (DESYREL) 50 MG tablet Take 1 tablet by mouth Every Night. 30 tablet 11    Semaglutide, 1 MG/DOSE, (Ozempic, 1 MG/DOSE,) 4 MG/3ML solution pen-injector Inject 1 mg under the skin into the appropriate area as directed 1 (One) Time Per Week.  "9 mL 3    brompheniramine-pseudoephedrine-DM 30-2-10 MG/5ML syrup Take 5 mL by mouth 4 (Four) Times a Day As Needed for Congestion or Cough. 118 mL 0     No facility-administered medications prior to visit.       Review of Systems   HENT:  Negative for congestion and sore throat.    Respiratory:  Negative for cough.    Cardiovascular:  Negative for chest pain.       Objective   Vital Signs:   Visit Vitals  /70 (BP Location: Left arm, Patient Position: Sitting, Cuff Size: Adult)   Pulse 79   Ht 162.6 cm (64.02\")   Wt 88.9 kg (196 lb)   SpO2 98%   BMI 33.63 kg/mý     Physical Exam  Vitals and nursing note reviewed.   Constitutional:       Appearance: She is well-developed.   HENT:      Head: Normocephalic and atraumatic.   Eyes:      General: Lids are normal.      Conjunctiva/sclera: Conjunctivae normal.   Neck:      Thyroid: No thyroid mass or thyromegaly.      Trachea: Trachea normal. No tracheal tenderness.   Cardiovascular:      Rate and Rhythm: Normal rate.      Pulses: Normal pulses.      Heart sounds: Normal heart sounds.   Pulmonary:      Effort: Pulmonary effort is normal. No respiratory distress.      Breath sounds: Normal breath sounds. No wheezing.   Abdominal:      General: There is no distension.      Palpations: Abdomen is soft. There is no mass.   Musculoskeletal:         General: Normal range of motion.      Cervical back: Normal range of motion. No edema.   Skin:     General: Skin is warm and dry.      Coloration: Skin is not pale.      Findings: No abrasion, erythema or lesion.   Neurological:      Mental Status: She is alert and oriented to person, place, and time.   Psychiatric:         Mood and Affect: Mood is not anxious. Affect is not inappropriate.         Speech: Speech normal.         Behavior: Behavior normal.         Thought Content: Thought content normal.         Judgment: Judgment normal. Judgment is not impulsive.      Result Review :                 Assessment and Plan  "   Diagnoses and all orders for this visit:    1. Obesity (BMI 30-39.9) (Primary)    2. Pre-diabetes  -     Semaglutide, 2 MG/DOSE, (Ozempic, 2 MG/DOSE,) 8 MG/3ML solution pen-injector; Inject 2 mg under the skin into the appropriate area as directed 1 (One) Time Per Week.  Dispense: 3 mL; Refill: 6    3. Breast cancer screening by mammogram  -     Mammo screening digital tomosynthesis bilateral w CAD; Future        Continue current medications, however we will increase Ozempic to 2mg     Please call the office if you have any issues     Mammogram ordered, they will call to schedule     Continue to follow bariatric surgeon as well as other specialist.         Follow Up   Return in about 4 weeks (around 9/25/2023), or if symptoms worsen or fail to improve, for Next scheduled follow up.  Patient was given instructions and counseling regarding her condition or for health maintenance advice. Please see specific information pulled into the AVS if appropriate.           This document has been electronically signed by MARIA TERESA Saucedo on August 28, 2023 13:26 CDT

## 2024-08-02 RX ORDER — LORAZEPAM 1 MG/1
1 TABLET ORAL 2 TIMES DAILY
COMMUNITY

## 2024-08-02 RX ORDER — GABAPENTIN 100 MG/1
100 CAPSULE ORAL 3 TIMES DAILY
COMMUNITY

## 2024-08-02 RX ORDER — LANOLIN ALCOHOL/MO/W.PET/CERES
1000 CREAM (GRAM) TOPICAL DAILY
COMMUNITY

## 2024-08-02 RX ORDER — BISACODYL 10 MG
10 SUPPOSITORY, RECTAL RECTAL DAILY PRN
COMMUNITY

## 2024-08-02 RX ORDER — ONDANSETRON 4 MG/1
4 TABLET, ORALLY DISINTEGRATING ORAL EVERY 8 HOURS PRN
COMMUNITY

## 2024-08-02 RX ORDER — AMLODIPINE BESYLATE 10 MG/1
10 TABLET ORAL DAILY
Status: ON HOLD | COMMUNITY
End: 2024-08-05

## 2024-08-02 RX ORDER — IBUPROFEN 200 MG
200 TABLET ORAL EVERY 4 HOURS PRN
COMMUNITY

## 2024-08-02 RX ORDER — POLYETHYLENE GLYCOL 3350 17 G/17G
17 POWDER, FOR SOLUTION ORAL PRN
COMMUNITY

## 2024-08-02 RX ORDER — METOPROLOL TARTRATE 25 MG/1
25 TABLET, FILM COATED ORAL EVERY MORNING
COMMUNITY

## 2024-08-02 RX ORDER — ACETAMINOPHEN 500 MG
500-1000 TABLET ORAL EVERY 6 HOURS PRN
COMMUNITY

## 2024-08-02 RX ORDER — BUPRENORPHINE AND NALOXONE 8; 2 MG/1; MG/1
1 FILM, SOLUBLE BUCCAL; SUBLINGUAL 2 TIMES DAILY
COMMUNITY

## 2024-08-02 RX ORDER — NICOTINE 21 MG/24HR
1 PATCH, TRANSDERMAL 24 HOURS TRANSDERMAL EVERY 24 HOURS
COMMUNITY

## 2024-08-02 RX ORDER — ARIPIPRAZOLE 10 MG/1
10 TABLET ORAL DAILY
COMMUNITY

## 2024-08-02 NOTE — PROGRESS NOTES
PTA medications updated by Medication Scribe prior to surgery via phone call with patient (last doses completed by Nurse)     Medication history sources: Patient, Surescripts, and H&P  In the past week, patient estimated taking medication this percent of the time: Greater than 90%      Significant changes made to the medication list:  Patient reports no longer taking the following meds (med scribe removed from PTA med list): Norvasc, Celebrex, Ergocalciferol, gabapentin, methadone, Zanaflex      Additional medication history information:   None    Medication reconciliation completed by provider prior to medication history? No    Time spent in this activity: 40 minutes    The information provided in this note is only as accurate as the sources available at the time of update(s)      Prior to Admission medications    Medication Sig Last Dose Taking? Auth Provider Long Term End Date   ARIPiprazole (ABILIFY) 10 MG tablet Take 10 mg by mouth daily  at am Yes Reported, Patient Yes    buprenorphine HCl-naloxone HCl (SUBOXONE) 8-2 MG per film Place 1 Film under the tongue daily  at am Yes Reported, Patient     buPROPion (WELLBUTRIN XL) 300 MG 24 hr tablet Take 1 tablet (300 mg) by mouth daily  at am Yes Magaly Suggs MD Yes    DULoxetine (CYMBALTA) 30 MG capsule Take 1 capsule (30 mg) by mouth 2 times daily  Patient taking differently: Take 30 mg by mouth daily Taken with duloxetine 60 mg  at am Yes Emeterio Ramirez MD Yes    DULoxetine (CYMBALTA) 60 MG capsule Take 1 capsule (60 mg) by mouth 2 times daily Taken with duloxetine 30 mg two daily  Patient taking differently: Take 60 mg by mouth daily Taken with duloxetine 30 mg  at am Yes Emeterio Ramirez MD Yes    ibuprofen (ADVIL/MOTRIN) 200 MG tablet Take 200 mg by mouth every 4 hours as needed for pain Past Month at PRN Yes Reported, Patient No    lamoTRIgine (LAMICTAL) 100 MG tablet TAKE 1 TABLET BY MOUTH EVERY MORNING AND 1/2 TABLET BY MOUTH EVERY NIGHT AT  BEDTIME  Patient taking differently: Take 100 mg by mouth daily  at am Yes Emeterio Ramirez MD Yes    LORazepam (ATIVAN) 1 MG tablet Take 1 mg by mouth 2 times daily  at pm Yes Reported, Patient     medical cannabis (Patient's own supply) 1 Dose by Other route Vaping 5 puffs inhaled 4-5 times a day, prn  at PRN Yes Reported, Patient     metoprolol tartrate (LOPRESSOR) 25 MG tablet Take 25 mg by mouth every morning  at am Yes Reported, Patient No    nicotine (NICODERM CQ) 14 MG/24HR 24 hr patch Place 1 patch onto the skin every 24 hours  at am Yes Reported, Patient No    omeprazole (PRILOSEC) 20 MG capsule Take 1 capsule (20 mg) by mouth every morning  Patient taking differently: Take 40 mg by mouth every morning ([id number])  at am Yes Magaly Suggs MD     ondansetron (ZOFRAN ODT) 4 MG ODT tab Take 4 mg by mouth every 8 hours as needed for nausea  at PRN Yes Reported, Patient     polyethylene glycol (MIRALAX) 17 GM/Dose powder Take 17 g by mouth daily  at am Yes Reported, Patient     senna-docusate (SENOKOT-S/PERICOLACE) 8.6-50 MG tablet Take 1 tablet by mouth 2 times daily as needed for constipation  Patient taking differently: Take 1 tablet by mouth daily  at am Yes Emeterio Ramirez MD Yes    ARIPiprazole (ABILIFY) 2 MG tablet Take 1 tablet (2 mg) by mouth daily   Magaly Suggs MD Yes        Medication history completed by: Bruce Naik

## 2024-08-04 ENCOUNTER — ANESTHESIA EVENT (OUTPATIENT)
Dept: SURGERY | Facility: CLINIC | Age: 59
End: 2024-08-04
Payer: MEDICAID

## 2024-08-04 ASSESSMENT — LIFESTYLE VARIABLES: TOBACCO_USE: 1

## 2024-08-04 NOTE — ANESTHESIA PREPROCEDURE EVALUATION
Anesthesia Pre-Procedure Evaluation    Patient: Jaime L Franklin Behrends   MRN: [id number] : 1965        Procedure : Procedure(s):  RIGHT FIRST METATARSOPHALANGEAL FUSION, SECOND AND THIRD METATARSAL SHORTENING,  SECOND AND THIRD HAMMERTOE CORRECTION, WAYLON GASTROC LENGTHENING          Past Medical History:   Diagnosis Date    Anemia     Anxiety     Arthritis of knee, left 2019    Bulimia (H28)     past anorexia    Chemical dependency (H)     Chronic kidney disease, stage III (moderate) (H)     Polycystic kidney disease    Chronic pain     Constipation     Depression     Depressive disorder     Diaphragmatic hernia     Dyspareunia due to medical condition in female     Foot pain     GERD (gastroesophageal reflux disease)     Hiatal hernia     Homeless     HTN (hypertension)     Hyperglycemia     Hyperlipidemia     Hypertension     Kidney cysts     Via imaging 2014    Kidney stone     Leukocytosis     Liver cyst     Via imaging 2014    Normocytic anemia     Obesity     Opioid abuse (H)     Pain management contract agreement     Patellar dislocation     Pes planus     Polycystic kidney disease     Sees kidney specialist [place]    Pyelonephritis     Rectocele     Spleen enlarged     Via imaging 2014 with normal f/u labs. Recommended hematology referral per PCP    Substance abuse (H)     Pot, meth, opiate pills, heroin    Suicidal ideation     Ureteral stricture     Urinary calculus, unspecified     Renal stones, multiple.     Vision loss of right eye     Vitamin B12 deficiency (non anemic)     Vitamin D deficiency       Past Surgical History:   Procedure Laterality Date    APPENDECTOMY      APPENDECTOMY      ARTHRODESIS ANKLE Left 2018    Procedure: LEFT ANKLE REALIGNMENT FUSION WITH BONE GRAFTING AND HARDWARE REMOVAL;  Surgeon: Kei Ascencio DPM;  Location: [facility];  Service:     ARTHROSCOPY ANKLE      ARTHROSCOPY ANKLE Left 2018    Procedure: LEFT ANKLE ARTHROSCOPY;   Surgeon: Med Burk DO;  Location: [facility] Main OR;  Service:     ARTHROSCOPY KNEE      BREAST SURGERY      breast biopsy-benign    COLONOSCOPY  2007    negative, f/u age 51, then every 5 years    DENTAL SURGERY      HYSTERECTOMY      HYSTERECTOMY, TERRY      Ovaries spared.     KIDNEY STONE SURGERY      stone removal x 7    LEG TENDON SURGERY Left 2013    Left Beard & posterior tibial tendon repair, flexor digitorium longus transfer, lapidus & gastroc soleus recession    LITHOTRIPSY      x 7-8    NEPHROSTOMY TRACT DILATATION W/ LITHOTRIPSY      RECONSTRUCT ANKLE Left 2017    Procedure: LEFT DELTOID AND LATERAL LIGAMENT RECONSTRUCTION ;  Surgeon: Med Burk DO;  Location: [facility] Main OR;  Service:     TONSILLECTOMY      14 yo    [facility] FUSION FOOT BONES,SUBTALAR Left 2018    Procedure: LEFT ANKLE ARTHRODESIS, HARDWARE REMOVAL;  Surgeon: Med Burk DO;  Location: [facility] Main OR;  Service: Orthopedics    [facility] TOTAL KNEE ARTHROPLASTY Left 2019    Procedure: LEFT TOTAL KNEE ARTHROPLASTY;  Surgeon: Roscoe Shah MD;  Location: [facility] Main OR;  Service: Orthopedics    [facility] TOTAL KNEE ARTHROPLASTY Right 2019    Procedure: RIGHT TOTAL KNEE ARTHROPLASTY;  Surgeon: Roscoe Shah MD;  Location: [facility] Main OR;  Service: Orthopedics      No Known Allergies   Social History     Tobacco Use    Smoking status: Former     Current packs/day: 0.00     Average packs/day: 0.3 packs/day for 25.0 years (6.3 ttl pk-yrs)     Types: Cigarettes     Start date: 1994     Quit date: 2019     Years since quittin.9    Smokeless tobacco: Never    Tobacco comments:     2 cigs per day   Substance Use Topics    Alcohol use: Not Currently      Wt Readings from Last 1 Encounters:   20 89 kg (196 lb 3.4 oz)        Anesthesia Evaluation            ROS/MED HX  ENT/Pulmonary:     (+)                tobacco use, Current use,                      "  Neurologic:       Cardiovascular:     (+) Dyslipidemia hypertension- -   -  - -                                      METS/Exercise Tolerance:     Hematologic: Comments: Vitamin B12, vitamin D deficiency, iron deficiency anemia    (+)      anemia,          Musculoskeletal:   (+)  arthritis,             GI/Hepatic:     (+) GERD,     hiatal hernia,              Renal/Genitourinary: Comment: Polycystic kidney disease, CKD 3, hydronephrosis    (+) renal disease, type: CRI,            Endo:       Psychiatric/Substance Use:     (+) psychiatric history anxiety and depression  H/O chronic opiod use . Recreational drug usage: Cannabis.    Infectious Disease:       Malignancy:       Other:      (+)  , H/O Chronic Pain,            OUTSIDE LABS:  CBC:   Lab Results   Component Value Date    WBC 8.1 01/27/2018    WBC 7.4 09/09/2014    HGB 11.0 (L) 08/23/2019    HGB 10.6 (L) 08/22/2019    HCT 34.2 (L) 01/27/2018    HCT 37.1 09/09/2014     01/27/2018     09/09/2014     BMP:   Lab Results   Component Value Date     08/21/2019     01/27/2018    POTASSIUM 3.8 08/22/2019    POTASSIUM 4.0 08/21/2019    CHLORIDE 107 08/21/2019    CHLORIDE 105 01/27/2018    CO2 23 08/21/2019    CO2 30 01/27/2018    BUN 19 08/21/2019    BUN 15 01/27/2018    CR 1.38 (H) 08/22/2019    CR 1.30 (H) 08/21/2019    GLC 95 08/21/2019     01/27/2018     COAGS: No results found for: \"PTT\", \"INR\", \"FIBR\"  POC:   Lab Results   Component Value Date    HCG Negative 01/05/2014     HEPATIC:   Lab Results   Component Value Date    ALBUMIN 3.0 (L) 09/06/2014    PROTTOTAL 7.1 09/06/2014    ALT 10 09/06/2014    AST 12 09/06/2014    ALKPHOS 88 09/06/2014    BILITOTAL 0.3 09/06/2014     OTHER:   Lab Results   Component Value Date    VIOLET 9.2 08/21/2019    CRP 7.4 09/09/2014    SED 31 (H) 09/09/2014       Anesthesia Plan    ASA Status:  3    NPO Status:  NPO Appropriate    Anesthesia Type: General.     - Airway: LMA   Induction: Intravenous, " Propofol.   Maintenance: TIVA.        Consents    Anesthesia Plan(s) and associated risks, benefits, and realistic alternatives discussed. Questions answered and patient/representative(s) expressed understanding.     - Discussed:     - Discussed with:  Patient      - Extended Intubation/Ventilatory Support Discussed: No.      - Patient is DNR/DNI Status: No     Use of blood products discussed: Yes.     - Discussed with: Patient.     - Consented: consented to blood products            Reason for refusal: other.     Postoperative Care    Pain management: IV analgesics, Oral pain medications, Multi-modal analgesia, Peripheral nerve block (Single Shot).   PONV prophylaxis: Ondansetron (or other 5HT-3), Dexamethasone or Solumedrol, Background Propofol Infusion     Comments:    Other Comments: Will advocate for peripheral nerve block morning of surgery.  Will discuss with Dr Bynum. Chronic pain patient, on suboxone.           Josef Zelaya, DO    I have reviewed the pertinent notes and labs in the chart from the past 30 days and (re)examined the patient.  Any updates or changes from those notes are reflected in this note.

## 2024-08-05 ENCOUNTER — APPOINTMENT (OUTPATIENT)
Dept: GENERAL RADIOLOGY | Facility: CLINIC | Age: 59
End: 2024-08-05
Attending: ORTHOPAEDIC SURGERY
Payer: MEDICAID

## 2024-08-05 ENCOUNTER — ANESTHESIA (OUTPATIENT)
Dept: SURGERY | Facility: CLINIC | Age: 59
End: 2024-08-05
Payer: MEDICAID

## 2024-08-05 ENCOUNTER — HOSPITAL ENCOUNTER (OUTPATIENT)
Facility: CLINIC | Age: 59
Discharge: HOME | End: 2024-08-05
Attending: ORTHOPAEDIC SURGERY | Admitting: ORTHOPAEDIC SURGERY
Payer: MEDICAID

## 2024-08-05 VITALS
SYSTOLIC BLOOD PRESSURE: 109 MMHG | WEIGHT: 162.7 LBS | BODY MASS INDEX: 24.66 KG/M2 | TEMPERATURE: 97.9 F | RESPIRATION RATE: 15 BRPM | DIASTOLIC BLOOD PRESSURE: 71 MMHG | OXYGEN SATURATION: 94 % | HEIGHT: 68 IN | HEART RATE: 73 BPM

## 2024-08-05 DIAGNOSIS — M20.21 HALLUX RIGIDUS, ACQUIRED, RIGHT: Primary | ICD-10-CM

## 2024-08-05 PROCEDURE — 258N000003 HC RX IP 258 OP 636: Performed by: NURSE ANESTHETIST, CERTIFIED REGISTERED

## 2024-08-05 PROCEDURE — 250N000009 HC RX 250: Performed by: NURSE ANESTHETIST, CERTIFIED REGISTERED

## 2024-08-05 PROCEDURE — 250N000012 HC RX MED GY IP 250 OP 636 PS 637: Performed by: ANESTHESIOLOGY

## 2024-08-05 PROCEDURE — 999N000179 XR SURGERY CARM FLUORO LESS THAN 5 MIN W STILLS

## 2024-08-05 PROCEDURE — 271N000001 HC OR GENERAL SUPPLY NON-STERILE: Performed by: ORTHOPAEDIC SURGERY

## 2024-08-05 PROCEDURE — 28306 INCISION OF METATARSAL: CPT | Performed by: ANESTHESIOLOGY

## 2024-08-05 PROCEDURE — 28306 INCISION OF METATARSAL: CPT | Performed by: NURSE ANESTHETIST, CERTIFIED REGISTERED

## 2024-08-05 PROCEDURE — 360N000083 HC SURGERY LEVEL 3 W/ FLUORO, PER MIN: Performed by: ORTHOPAEDIC SURGERY

## 2024-08-05 PROCEDURE — 999N000141 HC STATISTIC PRE-PROCEDURE NURSING ASSESSMENT: Performed by: ORTHOPAEDIC SURGERY

## 2024-08-05 PROCEDURE — C1769 GUIDE WIRE: HCPCS | Performed by: ORTHOPAEDIC SURGERY

## 2024-08-05 PROCEDURE — 272N000001 HC OR GENERAL SUPPLY STERILE: Performed by: ORTHOPAEDIC SURGERY

## 2024-08-05 PROCEDURE — C1713 ANCHOR/SCREW BN/BN,TIS/BN: HCPCS | Performed by: ORTHOPAEDIC SURGERY

## 2024-08-05 PROCEDURE — 250N000011 HC RX IP 250 OP 636: Performed by: NURSE ANESTHETIST, CERTIFIED REGISTERED

## 2024-08-05 PROCEDURE — 250N000009 HC RX 250: Performed by: ANESTHESIOLOGY

## 2024-08-05 PROCEDURE — 710N000012 HC RECOVERY PHASE 2, PER MINUTE: Performed by: ORTHOPAEDIC SURGERY

## 2024-08-05 PROCEDURE — 258N000003 HC RX IP 258 OP 636: Performed by: ANESTHESIOLOGY

## 2024-08-05 PROCEDURE — 250N000011 HC RX IP 250 OP 636: Performed by: ANESTHESIOLOGY

## 2024-08-05 PROCEDURE — 250N000009 HC RX 250: Performed by: ORTHOPAEDIC SURGERY

## 2024-08-05 PROCEDURE — 250N000025 HC SEVOFLURANE, PER MIN: Performed by: ORTHOPAEDIC SURGERY

## 2024-08-05 PROCEDURE — 710N000009 HC RECOVERY PHASE 1, LEVEL 1, PER MIN: Performed by: ORTHOPAEDIC SURGERY

## 2024-08-05 PROCEDURE — 370N000017 HC ANESTHESIA TECHNICAL FEE, PER MIN: Performed by: ORTHOPAEDIC SURGERY

## 2024-08-05 PROCEDURE — 250N000011 HC RX IP 250 OP 636: Performed by: ORTHOPAEDIC SURGERY

## 2024-08-05 PROCEDURE — 250N000013 HC RX MED GY IP 250 OP 250 PS 637: Performed by: ANESTHESIOLOGY

## 2024-08-05 DEVICE — VAL KREULOCK SCREW, TI, 3.0X16
Type: IMPLANTABLE DEVICE | Site: FOOT | Status: FUNCTIONAL
Brand: ARTHREX®

## 2024-08-05 DEVICE — IMPLANTABLE DEVICE: Type: IMPLANTABLE DEVICE | Site: FOOT | Status: FUNCTIONAL

## 2024-08-05 DEVICE — IMP SCR ARTHREX MTP LP CORTICAL 3X24MM TI AR-8933-24: Type: IMPLANTABLE DEVICE | Site: FOOT | Status: FUNCTIONAL

## 2024-08-05 DEVICE — MAXFORCE MTP PLATE, STD, 0°-0°, RIGHT
Type: IMPLANTABLE DEVICE | Site: FOOT | Status: FUNCTIONAL
Brand: ARTHREX®

## 2024-08-05 DEVICE — LO-PRO SCRW,TI,3.0MMX 14MMCORT
Type: IMPLANTABLE DEVICE | Site: FOOT | Status: FUNCTIONAL
Brand: ARTHREX®

## 2024-08-05 DEVICE — VAL KREULOCK SCREW, TI, 3.0X14
Type: IMPLANTABLE DEVICE | Site: FOOT | Status: FUNCTIONAL
Brand: ARTHREX®

## 2024-08-05 RX ORDER — OXYCODONE HYDROCHLORIDE 5 MG/1
5-10 TABLET ORAL EVERY 4 HOURS PRN
Qty: 26 TABLET | Refills: 0 | Status: SHIPPED | OUTPATIENT
Start: 2024-08-05

## 2024-08-05 RX ORDER — HYDROMORPHONE HCL IN WATER/PF 6 MG/30 ML
0.4 PATIENT CONTROLLED ANALGESIA SYRINGE INTRAVENOUS EVERY 5 MIN PRN
Status: DISCONTINUED | OUTPATIENT
Start: 2024-08-05 | End: 2024-08-05 | Stop reason: HOSPADM

## 2024-08-05 RX ORDER — HYDROMORPHONE HCL IN WATER/PF 6 MG/30 ML
0.2 PATIENT CONTROLLED ANALGESIA SYRINGE INTRAVENOUS EVERY 5 MIN PRN
Status: DISCONTINUED | OUTPATIENT
Start: 2024-08-05 | End: 2024-08-05 | Stop reason: HOSPADM

## 2024-08-05 RX ORDER — PROPOFOL 10 MG/ML
INJECTION, EMULSION INTRAVENOUS PRN
Status: DISCONTINUED | OUTPATIENT
Start: 2024-08-05 | End: 2024-08-05

## 2024-08-05 RX ORDER — LABETALOL HYDROCHLORIDE 5 MG/ML
10 INJECTION, SOLUTION INTRAVENOUS
Status: DISCONTINUED | OUTPATIENT
Start: 2024-08-05 | End: 2024-08-05 | Stop reason: HOSPADM

## 2024-08-05 RX ORDER — CEFAZOLIN SODIUM/WATER 2 G/20 ML
2 SYRINGE (ML) INTRAVENOUS SEE ADMIN INSTRUCTIONS
Status: DISCONTINUED | OUTPATIENT
Start: 2024-08-05 | End: 2024-08-05 | Stop reason: HOSPADM

## 2024-08-05 RX ORDER — DEXAMETHASONE SODIUM PHOSPHATE 4 MG/ML
4 INJECTION, SOLUTION INTRA-ARTICULAR; INTRALESIONAL; INTRAMUSCULAR; INTRAVENOUS; SOFT TISSUE
Status: DISCONTINUED | OUTPATIENT
Start: 2024-08-05 | End: 2024-08-05 | Stop reason: HOSPADM

## 2024-08-05 RX ORDER — LIDOCAINE HYDROCHLORIDE 20 MG/ML
INJECTION, SOLUTION INFILTRATION; PERINEURAL PRN
Status: DISCONTINUED | OUTPATIENT
Start: 2024-08-05 | End: 2024-08-05

## 2024-08-05 RX ORDER — LIDOCAINE 40 MG/G
CREAM TOPICAL
Status: DISCONTINUED | OUTPATIENT
Start: 2024-08-05 | End: 2024-08-05 | Stop reason: HOSPADM

## 2024-08-05 RX ORDER — SODIUM CHLORIDE, SODIUM LACTATE, POTASSIUM CHLORIDE, CALCIUM CHLORIDE 600; 310; 30; 20 MG/100ML; MG/100ML; MG/100ML; MG/100ML
INJECTION, SOLUTION INTRAVENOUS CONTINUOUS
Status: DISCONTINUED | OUTPATIENT
Start: 2024-08-05 | End: 2024-08-05 | Stop reason: HOSPADM

## 2024-08-05 RX ORDER — CEFAZOLIN SODIUM/WATER 2 G/20 ML
2 SYRINGE (ML) INTRAVENOUS
Status: COMPLETED | OUTPATIENT
Start: 2024-08-05 | End: 2024-08-05

## 2024-08-05 RX ORDER — ONDANSETRON 4 MG/1
4 TABLET, ORALLY DISINTEGRATING ORAL EVERY 30 MIN PRN
Status: DISCONTINUED | OUTPATIENT
Start: 2024-08-05 | End: 2024-08-05 | Stop reason: HOSPADM

## 2024-08-05 RX ORDER — ACETAMINOPHEN 325 MG/1
650 TABLET ORAL
Status: DISCONTINUED | OUTPATIENT
Start: 2024-08-05 | End: 2024-08-05 | Stop reason: HOSPADM

## 2024-08-05 RX ORDER — ONDANSETRON 4 MG/1
4 TABLET, ORALLY DISINTEGRATING ORAL EVERY 8 HOURS PRN
Qty: 4 TABLET | Refills: 0 | Status: SHIPPED | OUTPATIENT
Start: 2024-08-05

## 2024-08-05 RX ORDER — NALOXONE HYDROCHLORIDE 0.4 MG/ML
0.1 INJECTION, SOLUTION INTRAMUSCULAR; INTRAVENOUS; SUBCUTANEOUS
Status: DISCONTINUED | OUTPATIENT
Start: 2024-08-05 | End: 2024-08-05 | Stop reason: HOSPADM

## 2024-08-05 RX ORDER — ASPIRIN 325 MG
325 TABLET, DELAYED RELEASE (ENTERIC COATED) ORAL DAILY
Qty: 28 TABLET | Refills: 0 | Status: SHIPPED | OUTPATIENT
Start: 2024-08-05

## 2024-08-05 RX ORDER — MAGNESIUM HYDROXIDE 1200 MG/15ML
LIQUID ORAL PRN
Status: DISCONTINUED | OUTPATIENT
Start: 2024-08-05 | End: 2024-08-05 | Stop reason: HOSPADM

## 2024-08-05 RX ORDER — FENTANYL CITRATE 0.05 MG/ML
25 INJECTION, SOLUTION INTRAMUSCULAR; INTRAVENOUS EVERY 5 MIN PRN
Status: DISCONTINUED | OUTPATIENT
Start: 2024-08-05 | End: 2024-08-05 | Stop reason: HOSPADM

## 2024-08-05 RX ORDER — EPHEDRINE SULFATE 50 MG/ML
INJECTION, SOLUTION INTRAMUSCULAR; INTRAVENOUS; SUBCUTANEOUS PRN
Status: DISCONTINUED | OUTPATIENT
Start: 2024-08-05 | End: 2024-08-05

## 2024-08-05 RX ORDER — CEPHALEXIN 500 MG/1
500 CAPSULE ORAL 4 TIMES DAILY
Qty: 28 CAPSULE | Refills: 0 | Status: SHIPPED | OUTPATIENT
Start: 2024-08-05 | End: 2024-08-12

## 2024-08-05 RX ORDER — FENTANYL CITRATE 0.05 MG/ML
50 INJECTION, SOLUTION INTRAMUSCULAR; INTRAVENOUS
Status: DISCONTINUED | OUTPATIENT
Start: 2024-08-05 | End: 2024-08-05 | Stop reason: HOSPADM

## 2024-08-05 RX ORDER — FENTANYL CITRATE 0.05 MG/ML
50 INJECTION, SOLUTION INTRAMUSCULAR; INTRAVENOUS EVERY 5 MIN PRN
Status: DISCONTINUED | OUTPATIENT
Start: 2024-08-05 | End: 2024-08-05 | Stop reason: HOSPADM

## 2024-08-05 RX ORDER — ONDANSETRON 2 MG/ML
4 INJECTION INTRAMUSCULAR; INTRAVENOUS EVERY 30 MIN PRN
Status: DISCONTINUED | OUTPATIENT
Start: 2024-08-05 | End: 2024-08-05 | Stop reason: HOSPADM

## 2024-08-05 RX ORDER — ACETAMINOPHEN 325 MG/1
650 TABLET ORAL EVERY 4 HOURS PRN
Qty: 50 TABLET | Refills: 0 | Status: SHIPPED | OUTPATIENT
Start: 2024-08-05

## 2024-08-05 RX ORDER — FENTANYL CITRATE 50 UG/ML
INJECTION, SOLUTION INTRAMUSCULAR; INTRAVENOUS PRN
Status: DISCONTINUED | OUTPATIENT
Start: 2024-08-05 | End: 2024-08-05

## 2024-08-05 RX ORDER — OXYCODONE HYDROCHLORIDE 5 MG/1
5 TABLET ORAL
Status: DISCONTINUED | OUTPATIENT
Start: 2024-08-05 | End: 2024-08-05 | Stop reason: HOSPADM

## 2024-08-05 RX ORDER — DEXAMETHASONE SODIUM PHOSPHATE 4 MG/ML
INJECTION, SOLUTION INTRA-ARTICULAR; INTRALESIONAL; INTRAMUSCULAR; INTRAVENOUS; SOFT TISSUE PRN
Status: DISCONTINUED | OUTPATIENT
Start: 2024-08-05 | End: 2024-08-05

## 2024-08-05 RX ORDER — APREPITANT 40 MG/1
40 CAPSULE ORAL ONCE
Status: COMPLETED | OUTPATIENT
Start: 2024-08-05 | End: 2024-08-05

## 2024-08-05 RX ORDER — PROPOFOL 10 MG/ML
INJECTION, EMULSION INTRAVENOUS CONTINUOUS PRN
Status: DISCONTINUED | OUTPATIENT
Start: 2024-08-05 | End: 2024-08-05

## 2024-08-05 RX ORDER — ONDANSETRON 2 MG/ML
INJECTION INTRAMUSCULAR; INTRAVENOUS PRN
Status: DISCONTINUED | OUTPATIENT
Start: 2024-08-05 | End: 2024-08-05

## 2024-08-05 RX ORDER — ACETAMINOPHEN 325 MG/1
975 TABLET ORAL ONCE
Status: COMPLETED | OUTPATIENT
Start: 2024-08-05 | End: 2024-08-05

## 2024-08-05 RX ADMIN — FENTANYL CITRATE 100 MCG: 50 INJECTION INTRAMUSCULAR; INTRAVENOUS at 11:03

## 2024-08-05 RX ADMIN — SODIUM CHLORIDE, POTASSIUM CHLORIDE, SODIUM LACTATE AND CALCIUM CHLORIDE: 600; 310; 30; 20 INJECTION, SOLUTION INTRAVENOUS at 10:23

## 2024-08-05 RX ADMIN — PROPOFOL 150 MG: 10 INJECTION, EMULSION INTRAVENOUS at 11:03

## 2024-08-05 RX ADMIN — ONDANSETRON 4 MG: 2 INJECTION INTRAMUSCULAR; INTRAVENOUS at 11:10

## 2024-08-05 RX ADMIN — Medication 2 G: at 10:54

## 2024-08-05 RX ADMIN — MIDAZOLAM 2 MG: 1 INJECTION INTRAMUSCULAR; INTRAVENOUS at 10:59

## 2024-08-05 RX ADMIN — ACETAMINOPHEN 975 MG: 325 TABLET, FILM COATED ORAL at 10:21

## 2024-08-05 RX ADMIN — PROPOFOL 50 MCG/KG/MIN: 10 INJECTION, EMULSION INTRAVENOUS at 11:06

## 2024-08-05 RX ADMIN — MIDAZOLAM 1 MG: 1 INJECTION INTRAMUSCULAR; INTRAVENOUS at 10:37

## 2024-08-05 RX ADMIN — Medication 5 MG: at 11:29

## 2024-08-05 RX ADMIN — MIDAZOLAM 1 MG: 1 INJECTION INTRAMUSCULAR; INTRAVENOUS at 10:36

## 2024-08-05 RX ADMIN — APREPITANT 40 MG: 40 CAPSULE ORAL at 10:21

## 2024-08-05 RX ADMIN — BUPIVACAINE HYDROCHLORIDE 28 ML: 5 INJECTION, SOLUTION EPIDURAL; INTRACAUDAL at 10:38

## 2024-08-05 RX ADMIN — DEXMEDETOMIDINE HYDROCHLORIDE 12 MCG: 100 INJECTION, SOLUTION INTRAVENOUS at 12:26

## 2024-08-05 RX ADMIN — DEXAMETHASONE SODIUM PHOSPHATE 4 MG: 4 INJECTION, SOLUTION INTRA-ARTICULAR; INTRALESIONAL; INTRAMUSCULAR; INTRAVENOUS; SOFT TISSUE at 11:10

## 2024-08-05 RX ADMIN — DEXMEDETOMIDINE HYDROCHLORIDE 8 MCG: 100 INJECTION, SOLUTION INTRAVENOUS at 12:34

## 2024-08-05 RX ADMIN — Medication 5 MG: at 11:32

## 2024-08-05 RX ADMIN — LIDOCAINE HYDROCHLORIDE 100 MG: 20 INJECTION, SOLUTION INFILTRATION; PERINEURAL at 11:03

## 2024-08-05 RX ADMIN — PHENYLEPHRINE HYDROCHLORIDE 0.5 MCG/KG/MIN: 10 INJECTION INTRAVENOUS at 11:08

## 2024-08-05 ASSESSMENT — ACTIVITIES OF DAILY LIVING (ADL)
ADLS_ACUITY_SCORE: 35

## 2024-08-05 NOTE — ANESTHESIA PROCEDURE NOTES
Airway       Patient location during procedure: OR  Staff -        Anesthesiologist:  Josef Zelaya DO       CRNA: Chris Waldrop APRN CRNA       Performed By: CRNA  Consent for Airway        Urgency: elective  Indications and Patient Condition       Indications for airway management: anna-procedural       Induction type:intravenous       Mask difficulty assessment: 0 - not attempted    Final Airway Details       Final airway type: supraglottic airway    Supraglottic Airway Details        Type: LMA       Brand: I-Gel       LMA size: 4    Post intubation assessment        Placement verified by: capnometry and chest rise        Number of attempts at approach: 1       Number of other approaches attempted: 0       Secured with: silk tape       Ease of procedure: easy       Dentition: Unchanged

## 2024-08-05 NOTE — OP NOTE
Orthopaedic Surgery Operative Note     Patient: Jaime L Franklin Behrends  : 1965  Date of Service: 2024 12:41 PM    Pre-operative Diagnosis:    Right hallux rigidus  Right rigid second hammertoe  Right third digit hammertoe  Right second MTP dislocation  Right third MTP dislocation  Right gastrocnemius contracture    Post-operative Diagnosis:    same    Procedure(s):    Right Delfino  Right first MTP fusion  Right second hammertoe correction with PIP fusion K wire fixation  Right Third hammertoe correction with PIP fusion K wire fixation  Right second metatarsal shortening osteotomy  Right third metatarsal shortening osteotomy  Ordering, taking, interpretation of intraoperative X-rays right foot AP and lateral    Surgeon:  Marcus Bynum MD   Assistant(s):  none    Anesthesia: General and block  Estimated Blood Loss:  20 mL  Findings:  see operative note  Complications:  none  Implants:    Implant Name Type Inv. Item Serial No.  Lot No. LRB No. Used Action   2.0mm cortical screws Metallic Hardware/[place]   ARTHREX [id number] Right 1 Implanted   SCREW BN 16MM 2MM LOPRFL NS DAPHNE LF - [id number] Metallic Hardware/[place] SCREW BN 16MM 2MM LOPRFL NS DAPHNE LF  ARTHRE[employer] [id number] Right 1 Implanted   IMP SCR ARTHREX MTP LP CORTICAL 3X24MM TI AR-8933-24 - [id number] Metallic Hardware/[place] IMP SCR ARTHREX MTP LP CORTICAL 3X24MM TI AR-8933-24  ARTHREX 4105 [id number] Right 1 Implanted   SCREW BONE KREULOCK TITANIUM 14X3MM [id number] - [id number] Metallic Hardware/[place] SCREW BONE KREULOCK TITANIUM 14X3MM [id number]  ARTHREX 4105 [id number] Right 3 Implanted   SCREW BONE KREULOCK TITANIUM 16X3MM [id number] - [id number] Metallic Hardware/[place] SCREW BONE KREULOCK TITANIUM 16X3MM [id number]  ARTHREX 4105 [id number] Right 2 Implanted   IMP PLATE ARTHREX COMPRESSION MXFRC STD 0D RIGHT [id number] - [id number] Metallic Hardware/[place] IMP PLATE ARTHREX COMPRESSION MXFRC STD 0D RIGHT  [id number]  ARTHREX 4105 [id number] Right 1 Implanted   IMP SCR ARTHREX CORTICAL LP 3X14MM TI AR-8933-14 - [id number] Metallic Hardware/[place] IMP SCR ARTHREX CORTICAL LP 3X14MM TI AR-8933-14  ARTHREX 4105 [id number] Right 1 Implanted     Condition:  Stable    Indications:  Jaime L Franklin Behrends is a pleasant 59 year old female with a history of severe foot deformity. The risks, benefits and alternatives to surgery were reviewed with the patient and all questions were answered to their understanding and satisfaction. Risks of surgery include, but are not limited to infection, bleeding, continued pain, neurovascular injury, fracture, loss of motion, instability, need for future procedure, malunion, nonunion, loss of fixation.  Also reviewed were possibility of DVT, PE, cardiac arrest, loss of life or limb. Written consent was obtained from the patient by the surgeon.    Description of Procedure:    The patient was identified in preoperative holding and the correct operative extremity was marked by the surgical staff. The patient was then transferred to the operative suite. Anesthesia was administered by the anesthesia department. The patient was prepped and draped in the standard sterile fashion. Antibiotics were infused prior to surgical incision. A multidisciplinary time-out was performed, identifying the correct patient and operative extremity.     A medial incision of the gastrocnemius musculotendinous junction was made.  Blunt dissection was carried down to level of fascia which was up in line with the skin incision.  The gastrocnemius fascia was defined bluntly and the sural nerve was protected through the case.  The gastrocnemius fascia was released.  There was significant relief of the equinus contracture with dorsiflexion to 30 degrees with hindfoot corrected.  Incision was closed    Dorsal incision over the first MTP joint was made.  Capsulotomy was performed and a medial eminence excision was performed  with a reciprocating saw.  There was full-thickness cartilage loss.  There is severe deformity.  With the contracture and a dislocated second toe it was difficult to tell the alignment of the first MTP.  The first MTP was denuded of cartilage using cup and cone reamers and fenestrated with a K wire.  It was provisionally pinned in appropriate alignment    Attention was turned to the second metatarsal shortening osteotomy. Dorsal incision was made.  Excess capsulectomy of the MTP joint was made.   The joint was reduced after the collaterals were released.  A distal metatarsal osteotomy was performed with a small dorsal wedge of bone removed.  The bone was then shortened to allow reduction of the dislocation.  A dorsal 2.0 millimeter screw was placed with excellent compression.    Attention was then turned to the second hammertoe.  Collaterals were released over the PIP joint and a generous shortening osteotomy and condylectomy was performed.  Middle phalanx was denuded of cartilage.  Flexor tenotomy was performed and the FDL was ultimately transferred to the dorsal proximal phalanx using 3-0 Monocryl suture.  A K wire was passed antegrade then retrograde through the toe and MTP joint    Attention was turned to the third metatarsal shortening osteotomy. Dorsal incision was made.  Excess capsulectomy of the MTP joint was made.   The joint was reduced after the collaterals were released.  A distal metatarsal osteotomy was performed with a small dorsal wedge of bone removed.  The bone was then shortened to allow reduction of the dislocation.  A dorsal 2.0 millimeter screw was placed with excellent compression.    Attention was then turned to the third hammertoe.  Collaterals were released over the PIP joint and a generous shortening osteotomy and condylectomy was performed.  Middle phalanx was denuded of cartilage.  Flexor tenotomy was performed and the FDL was ultimately transferred to the dorsal proximal phalanx using  3-0 Monocryl suture.  A K wire was passed antegrade then retrograde through the toe and MTP joint    At this time the alignment of the first MTP joint was confirmed fluoroscopically.  A lag screw was placed and a dorsal neutralization plate was placed with good alignment of the toe and excellent compression across the joint.    Tourniquet was deflated and hemostasis was confirmed.  Perfusion of all toes was confirmed.  Capsule of the first MTP joint was closed with 3-0 Monocryl suture    Interpretation of intraoperative XR right foot AP and lateral demonstrates well positioned hardware in good alignment.    Skin was closed with 3-0 Monocryl 3-0 nylon. A sterile dressings were applied. All counts were correct. The patient was awoken from anesthesia and transferred to the PACU in stable condition.    Post op Plan:    WBAT boot operative extremity  Follow up in 2 weeks, wound check sutures out    Sanford Bynum MD  Orthopedic Surgery  [facility]

## 2024-08-05 NOTE — ANESTHESIA PROCEDURE NOTES
Adductor canal (targeting saphenous nerve) Procedure Note    Pre-Procedure   Staff -        Anesthesiologist:  Josef Zelaya DO       Performed By: anesthesiologist       Location: pre-op       Pre-Anesthestic Checklist: patient identified, IV checked, site marked, risks and benefits discussed, informed consent, monitors and equipment checked, pre-op evaluation, at physician/surgeon's request and post-op pain management  Timeout:       Correct Patient: Yes        Correct Procedure: Yes        Correct Site: Yes        Correct Position: Yes        Correct Laterality: Yes        Site Marked: Yes  Procedure Documentation  Procedure: Adductor canal (targeting saphenous nerve)       Laterality: right       Patient Position: supine       Skin prep: Chloraprep       Local skin infiltrated with 1 mL of 1% lidocaine.        Needle Type: insulated       Needle Gauge: 21.        Needle Length (millimeters): 90        Ultrasound guided       1. Ultrasound was used to identify targeted nerve, plexus, vascular marker, or fascial plane and place a needle adjacent to it in real-time.       2. Ultrasound was used to visualize the spread of anesthetic in close proximity to the above referenced structure.       3. A permanent image is entered into the patient's record.       4. The visualized anatomic structures appeared normal.       5. There were no apparent abnormal pathologic findings.    Assessment/Narrative         The placement was negative for: blood aspirated, painful injection and site bleeding       Paresthesias: No.       Bolus given via needle..        Secured via.        Insertion/Infusion Method: Single Shot       Complications: none    Medication(s) Administered   Bupivacaine 0.5% w/ 1:400K Epi (Injection) - Injection   12 mL - 8/5/2024 10:45:00 AM   Comments:  Bolus via needle, 12 ml of 0.5% bupivacaine with 1:400,000 epinephrine.    Under ultrasound guidance, a 21 gauge needle was inserted and placed in close  "proximity to the saphenous nerve. Ultrasound was also used to visualize the spread of anesthetic in close proximity to the nerve being blocked. The nerve appeared anatomically normal, and there were no apparent abnormal pathological findings. Patient tolerated well, was mildly sedated but communicative throughout the procedure. A permanent ultrasound image was saved in the patient's record.    The surgeon has given a verbal order transferring care of this patient to me for the performance of regional analgesia block for post op pain control. It is requested of me because I am uniquely trained and qualified to perform this block and the surgeon is neither trained nor qualified to perform this procedure.         FOR [facility] ([facility]) ONLY:   Pain Team Contact information: please page the Pain Team Via [employer]. Search \"Pain\". During daytime hours, please page the attending first. At night please page the resident first.      "

## 2024-08-05 NOTE — ANESTHESIA POSTPROCEDURE EVALUATION
Patient: Jaime L Franklin Behrends    Procedure: Procedure(s):  RIGHT FIRST METATARSOPHALANGEAL FUSION, SECOND AND THIRD METATARSAL SHORTENING,  SECOND AND THIRD HAMMERTOE CORRECTION, WAYLON GASTROC LENGTHENING       Anesthesia Type:  General, Peripheral Nerve Block    Note:  Disposition: Outpatient   Postop Pain Control: Uneventful            Sign Out: Well controlled pain   PONV: No   Neuro/Psych: Uneventful            Sign Out: Acceptable/Baseline neuro status   Airway/Respiratory: Uneventful            Sign Out: Acceptable/Baseline resp. status   CV/Hemodynamics: Uneventful            Sign Out: Acceptable CV status; No obvious hypovolemia; No obvious fluid overload   Other NRE: NONE   DID A NON-ROUTINE EVENT OCCUR? No           Last vitals:  Vitals Value Taken Time   /71 08/05/24 1415   Temp 36.6  C (97.9  F) 08/05/24 1400   Pulse 81 08/05/24 1425   Resp 11 08/05/24 1425   SpO2 94 % 08/05/24 1424   Vitals shown include unfiled device data.    Electronically Signed By: Josef Zelaya DO  August 5, 2024  4:29 PM

## 2024-08-05 NOTE — ANESTHESIA CARE TRANSFER NOTE
Patient: Jaime L Franklin Behrends    Procedure: Procedure(s):  RIGHT FIRST METATARSOPHALANGEAL FUSION, SECOND AND THIRD METATARSAL SHORTENING,  SECOND AND THIRD HAMMERTOE CORRECTION, WAYLON GASTROC LENGTHENING       Diagnosis: Acquired hallux valgus of right foot [M20.11]  Contracture of muscle of right lower extremity [M62.461]  Claw toe, right [M20.5X1]  Diagnosis Additional Information: No value filed.    Anesthesia Type:   General     Note:    Oropharynx: oropharynx clear of all foreign objects and spontaneously breathing  Level of Consciousness: awake  Oxygen Supplementation: face mask  Level of Supplemental Oxygen (L/min / FiO2): 6  Independent Airway: airway patency satisfactory and stable  Dentition: dentition unchanged  Vital Signs Stable: post-procedure vital signs reviewed and stable  Report to RN Given: handoff report given  Patient transferred to: PACU    Handoff Report: Identifed the Patient, Identified the Reponsible Provider, Reviewed the pertinent medical history, Discussed the surgical course, Reviewed Intra-OP anesthesia mangement and issues during anesthesia, Set expectations for post-procedure period and Allowed opportunity for questions and acknowledgement of understanding      Vitals:  Vitals Value Taken Time   /66 08/05/24 1245   Temp 36.4  C (97.5  F) 08/05/24 1245   Pulse 67 08/05/24 1250   Resp 20 08/05/24 1250   SpO2 100 % 08/05/24 1250       Electronically Signed By: KANA Orantes CRNA  August 5, 2024  12:51 PM

## 2024-08-05 NOTE — PROGRESS NOTES
Discharge instructions reviewed with pt and pt's sig other Will.  Mreds opened and verified.  IV removed.  No questions or concerns.  Pt transported to door 2 via w/c w/ staff.

## 2024-08-05 NOTE — ANESTHESIA PROCEDURE NOTES
Popliteal and Sciatic Procedure Note    Pre-Procedure   Staff -        Anesthesiologist:  Josef Zelaya DO       Performed By: anesthesiologist       Location: pre-op       Pre-Anesthestic Checklist: patient identified, IV checked, site marked, risks and benefits discussed, informed consent, monitors and equipment checked, pre-op evaluation, at physician/surgeon's request and post-op pain management  Timeout:       Correct Patient: Yes        Correct Procedure: Yes        Correct Site: Yes        Correct Position: Yes        Correct Laterality: Yes        Site Marked: Yes  Procedure Documentation  Procedure: Popliteal, Sciatic       Laterality: right       Patient Position: supine       Skin prep: Chloraprep       Local skin infiltrated with 1 mL of 1% lidocaine.        Needle Type: insulated       Needle Gauge: 21.        Needle Length (millimeters): 100        Ultrasound guided       1. Ultrasound was used to identify targeted nerve, plexus, vascular marker, or fascial plane and place a needle adjacent to it in real-time.       2. Ultrasound was used to visualize the spread of anesthetic in close proximity to the above referenced structure.       3. A permanent image is entered into the patient's record.       4. The visualized anatomic structures appeared normal.       5. There were no apparent abnormal pathologic findings.    Assessment/Narrative         The placement was negative for: blood aspirated, painful injection and site bleeding       Paresthesias: No.       Bolus given via needle..        Secured via.        Insertion/Infusion Method: Single Shot       Complications: none    Medication(s) Administered   Bupivacaine 0.5% w/ 1:400K Epi (Injection) - Injection   28 mL - 8/5/2024 10:38:00 AM   Comments:  Bolus via needle, 28 ml of 0.5% Bupivacaine with 1:400,000 epinephrine.    Under ultrasound guidance, a 21 gauge needle was inserted and placed in close proximity to the sciatic nerve. Ultrasound  "was also used to visualize the spread of anesthetic in close proximity to the nerve being blocked. The nerve appeared anatomically normal, and there were no apparent abnormal pathological findings. Patient tolerated well, was mildly sedated but communicative throughout the procedure. A permanent ultrasound image was saved in the patient's record.    The surgeon has given a verbal order transferring care of this patient to me for the performance of regional analgesia block for post op pain control. It is requested of me because I am uniquely trained and qualified to perform this block and the surgeon is neither trained nor qualified to perform this procedure.         FOR [facility] ([facility]) ONLY:   Pain Team Contact information: please page the Pain Team Via [employer]. Search \"Pain\". During daytime hours, please page the attending first. At night please page the resident first.      "

## 2024-08-05 NOTE — DISCHARGE INSTRUCTIONS
** Today you were given 975 mg of Tylenol at 10:20 am. The recommended daily maximum dose is 4000 mg. **        Same Day Surgery Discharge Instructions for  Sedation and General Anesthesia     It's not unusual to feel dizzy, light-headed or faint for up to 24 hours after surgery or while taking pain medication.  If you have these symptoms: sit for a few minutes before standing and have someone assist you when you get up to walk or use the bathroom.    You should rest and relax for the next 24 hours. We recommend you make arrangements to have an adult stay with you for at least 24 hours after your discharge.  Avoid hazardous and strenuous activity.    DO NOT DRIVE any vehicle or operate mechanical equipment for 24 hours following the end of your surgery.  Even though you may feel normal, your reactions may be affected by the medication you have received.    Do not drink alcoholic beverages for 24 hours following surgery.     Slowly progress to your regular diet as you feel able. It's not unusual to feel nauseated and/or vomit after receiving anesthesia.  If you develop these symptoms, drink clear liquids (apple juice, ginger ale, broth, 7-up, etc. ) until you feel better.  If your nausea and vomiting persists for 24 hours, please notify your surgeon.      All narcotic pain medications, along with inactivity and anesthesia, can cause constipation. Drinking plenty of liquids and increasing fiber intake will help.    For any questions of a medical nature, call your surgeon.    Do not make important decisions for 24 hours.    If you had general anesthesia, you may have a sore throat for a couple of days related to the breathing tube used during surgery.  You may use Cepacol lozenges to help with this discomfort.  If it worsens or if you develop a fever, contact your surgeon.     If you feel your pain is not well managed with the pain medications prescribed by your surgeon, please contact your surgeon's office to let  "them know so they can address your concerns.           ** If you have questions or concerns about your procedure,   call Dr. Bynum at [phone] **                                                                                                      [facility]    DISCHARGE INSTRUCTIONS FOLLOWING   REGIONAL BLOCK ANESTHESIA    Numbness or lack of feeling in the arm/leg that was operated may last up to 24 hours.  The average time is usually 10-15 hours.  You may not be able to lift or move the arm or leg where the operation was by itself during that time.  Long-acting local anesthetic medicines were used to give you long-lasting pain relief.  Wear a sling until your arm is completely \"awake\"  Avoid bumping your arm, leg or foot while it is numb  Avoid extremes of hot or cold while it is numb  Remain quiet and restful the day of surgery.  Resume normal activities gradually over the next day or so as advise by your surgeon.  Do not drive or operate  Any machinery until your extremity is full  \"awake\"        You will have a tingling and prickly sensation in your arm/leg as the feeling begins to return; you can also expect some discomfort. The amount of discomfort is unpredictable, but if you have more pain that can be controlled with the pain medication you received, you should contact your surgeon.  Start to take your pain pills as soon as you start to feel any discomfort or pain.                "

## 2024-08-05 NOTE — OR NURSING
Pt has scratch on right forearm, pt scratched arm yesterday.  Right arm is warm and swollen at site of scratch, Dr. Bynum and Dr Zelaya notified.  Per Dr. Bynum ok to proceed with surgery, scratch cleaned.  Recommended antibiotic ointment to use at home.

## 2024-10-12 NOTE — TELEPHONE ENCOUNTER
Refill request: hydrocodone  Patient also asking if a referral was placed for orthopedic assessment.  Chart review:  There is an active referral that is pending review at this time    No red flags since last ov with [facility]: 5/7/21    Per : 5/18/21, 7 day fill    Requested Prescriptions     Pending Prescriptions Disp Refills     HYDROcodone-acetaminophen 5-325 mg per tablet 30 tablet 0     Sig: Take 1 tablet by mouth every 6 (six) hours as needed for pain.     Nayeli                
DISCHARGE

## (undated) DEVICE — CAST PADDING 4" STERILE 9044S

## (undated) DEVICE — DRSG ABDOMINAL 07 1/2X8" 7197D

## (undated) DEVICE — REAMER ARTHREX METATARSAL 20MM AR-8944MR-20

## (undated) DEVICE — CAST PADDING 4" UNSTERILE 9044

## (undated) DEVICE — GOWN XXLG REINFORCED 9071EL

## (undated) DEVICE — SU VICRYL 3-0 PS-1 18" UND J683

## (undated) DEVICE — GLOVE BIOGEL PI MICRO SZ 7.5 48575

## (undated) DEVICE — PIN GUARD 0.062 GREEN C-062

## (undated) DEVICE — SOL WATER IRRIG 1000ML BOTTLE 2F7114

## (undated) DEVICE — PREP CHLORAPREP 26ML TINTED ORANGE  260815

## (undated) DEVICE — DRSG GAUZE 4X4" 3033

## (undated) DEVICE — Device

## (undated) DEVICE — PREP DURAPREP 26ML APL 8630

## (undated) DEVICE — DRAPE SHEET REV FOLD 3/4 9349

## (undated) DEVICE — REAMER ARTHREX PHALANGEAL 20MM AR-8944PR-20

## (undated) DEVICE — DRAPE COVER C-ARM SEAMLESS SNAP-KAP 03-KP26 LATEX FREE

## (undated) DEVICE — SU ETHILON 3-0 FS-1 18" 669H

## (undated) DEVICE — LINEN TOWEL PACK X5 5464

## (undated) DEVICE — DRSG XEROFORM 5X9" 8884431605

## (undated) DEVICE — DRILL BIT ARTHREX MTP 2.0MM AR-8944-22

## (undated) DEVICE — PACK EXTREMITY SOP15EXFSD

## (undated) DEVICE — DRAPE C-ARMOR 5 SIDED 5523

## (undated) DEVICE — GUIDE WIRE ARTHREX W/TROCAR TIP .062" AR-8941K

## (undated) DEVICE — BNDG ELASTIC 4"X5YDS UNSTERILE 6611-40

## (undated) DEVICE — BLADE SAW OSCILLATING STRYK MED 9.0X25X0.38MM 2296-003-111

## (undated) DEVICE — SU MONOCRYL 3-0 PS-2 27" Y427H

## (undated) DEVICE — STPL SKIN 35W ROTATING HEAD PRW35

## (undated) DEVICE — GLOVE BIOGEL PI MICRO INDICATOR UNDERGLOVE SZ 7.5 48975

## (undated) RX ORDER — EPHEDRINE SULFATE 50 MG/ML
INJECTION, SOLUTION INTRAMUSCULAR; INTRAVENOUS; SUBCUTANEOUS
Status: DISPENSED
Start: 2024-08-05

## (undated) RX ORDER — PROPOFOL 10 MG/ML
INJECTION, EMULSION INTRAVENOUS
Status: DISPENSED
Start: 2024-08-05

## (undated) RX ORDER — ONDANSETRON 2 MG/ML
INJECTION INTRAMUSCULAR; INTRAVENOUS
Status: DISPENSED
Start: 2024-08-05

## (undated) RX ORDER — FENTANYL CITRATE 0.05 MG/ML
INJECTION, SOLUTION INTRAMUSCULAR; INTRAVENOUS
Status: DISPENSED
Start: 2024-08-05

## (undated) RX ORDER — BUPIVACAINE HYDROCHLORIDE AND EPINEPHRINE 5; 5 MG/ML; UG/ML
INJECTION, SOLUTION EPIDURAL; INTRACAUDAL; PERINEURAL
Status: DISPENSED
Start: 2024-08-05

## (undated) RX ORDER — ACETAMINOPHEN 325 MG/1
TABLET ORAL
Status: DISPENSED
Start: 2024-08-05

## (undated) RX ORDER — DEXAMETHASONE SODIUM PHOSPHATE 4 MG/ML
INJECTION, SOLUTION INTRA-ARTICULAR; INTRALESIONAL; INTRAMUSCULAR; INTRAVENOUS; SOFT TISSUE
Status: DISPENSED
Start: 2024-08-05

## (undated) RX ORDER — FENTANYL CITRATE 50 UG/ML
INJECTION, SOLUTION INTRAMUSCULAR; INTRAVENOUS
Status: DISPENSED
Start: 2024-08-05

## (undated) RX ORDER — APREPITANT 40 MG/1
CAPSULE ORAL
Status: DISPENSED
Start: 2024-08-05